# Patient Record
Sex: FEMALE | Race: WHITE | NOT HISPANIC OR LATINO | Employment: OTHER | URBAN - METROPOLITAN AREA
[De-identification: names, ages, dates, MRNs, and addresses within clinical notes are randomized per-mention and may not be internally consistent; named-entity substitution may affect disease eponyms.]

---

## 2023-12-18 ENCOUNTER — DOCUMENTATION ONLY (OUTPATIENT)
Dept: OTHER | Facility: CLINIC | Age: 73
End: 2023-12-18
Payer: MEDICARE

## 2023-12-21 VITALS
TEMPERATURE: 97.6 F | OXYGEN SATURATION: 97 % | DIASTOLIC BLOOD PRESSURE: 84 MMHG | HEART RATE: 78 BPM | WEIGHT: 165.8 LBS | BODY MASS INDEX: 26.02 KG/M2 | SYSTOLIC BLOOD PRESSURE: 136 MMHG | RESPIRATION RATE: 18 BRPM | HEIGHT: 67 IN

## 2023-12-21 PROBLEM — L71.9 ROSACEA: Status: ACTIVE | Noted: 2020-02-04

## 2023-12-21 PROBLEM — G30.9 MILD NEUROCOGNITIVE DISORDER DUE TO ALZHEIMER'S DISEASE (H): Status: ACTIVE | Noted: 2019-08-06

## 2023-12-21 PROBLEM — G31.84 MINIMAL COGNITIVE IMPAIRMENT: Status: ACTIVE | Noted: 2019-10-04

## 2023-12-21 PROBLEM — F06.70 MILD NEUROCOGNITIVE DISORDER DUE TO ALZHEIMER'S DISEASE (H): Status: ACTIVE | Noted: 2019-08-06

## 2023-12-21 PROBLEM — F09 COGNITIVE DISORDER: Status: ACTIVE | Noted: 2019-08-06

## 2023-12-21 PROBLEM — N84.1 ENDOCERVICAL POLYP: Status: ACTIVE | Noted: 2020-02-04

## 2023-12-21 RX ORDER — MEMANTINE HYDROCHLORIDE 10 MG/1
10 TABLET ORAL 2 TIMES DAILY
COMMUNITY

## 2023-12-21 RX ORDER — DIVALPROEX SODIUM 125 MG/1
125 TABLET, DELAYED RELEASE ORAL 2 TIMES DAILY
COMMUNITY

## 2023-12-21 RX ORDER — MULTIVIT WITH MINERALS/LUTEIN
1 TABLET ORAL DAILY
COMMUNITY

## 2023-12-21 RX ORDER — ACETAMINOPHEN 500 MG
1000 TABLET ORAL EVERY 6 HOURS PRN
Status: ON HOLD | COMMUNITY
End: 2024-02-12

## 2023-12-21 RX ORDER — QUETIAPINE FUMARATE 50 MG/1
50 TABLET, FILM COATED ORAL 2 TIMES DAILY
COMMUNITY
End: 2023-12-22

## 2023-12-21 RX ORDER — ESCITALOPRAM OXALATE 20 MG/1
20 TABLET ORAL DAILY
COMMUNITY

## 2023-12-21 RX ORDER — DONEPEZIL HYDROCHLORIDE 5 MG/1
5 TABLET, FILM COATED ORAL EVERY MORNING
COMMUNITY

## 2023-12-21 NOTE — PROGRESS NOTES
[facility] GERIATRICS    PRIMARY CARE PROVIDER AND CLINIC:  ERNST Valenzuela CNP, [address] [facility] [address]  Chief Complaint   Patient presents with    [facility] Medical Record Number:  [id number]  Place of Service where encounter took place:  [facility])(FGS) [[id number]]    Mildred Connelly  is a 73 year old  (1950),  admitted to the above facility on 12/4/23 .       HPI:      PMH: Alzheimer's dementia, anxiety    Resides at [facility] memory care unit.      Today's concern:    During exam, patient seen sitting in chair in memory care unit. HPI limited due to dementia. Reports doing well, likes living here and going to activities. Denies concerns today. Denies pain. Ambulates w/o assistive device. Admits to good appetite. Sleeping well at night. Denies constipation, diarrhea. Denies chest pain, SOB, headache, syncope.  Looking forward to spending [place] with her family/parents/siblings. States she grew up in [place] and was a .       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  FAMILY HISTORY: family history includes Atrial fibrillation in her father; Breast Cancer in her sister; Cancer in her brother; Cerebrovascular Disease in her  "brother, father, and maternal grandmother; Diabetes in her brother; Heart Disease in her brother; Liver Disease in her brother; Lymphoma in her mother; Osteoarthritis in her father; Stomach Cancer in her maternal grandmother.  SOCIAL HISTORY:   reports that she quit smoking about 49 years ago. Her smoking use included cigarettes. She has never used smokeless tobacco. She reports that she does not currently use alcohol. She reports that she does not use drugs.  Patient's living condition: lives in an assisted living facility      Post Discharge Medication Reconciliation Status:   MED REC REQUIRED  Post Medication Reconciliation Status: patient was not discharged from an inpatient facility or TCU    Current Outpatient Medications   Medication Sig    acetaminophen (TYLENOL) 500 MG tablet Take 1,000 mg by mouth every 6 hours as needed for mild pain    divalproex sodium delayed-release (DEPAKOTE) 125 MG DR tablet Take 125 mg by mouth 2 times daily    donepezil (ARICEPT) 5 MG tablet Take 5 mg by mouth daily    escitalopram (LEXAPRO) 20 MG tablet Take 20 mg by mouth daily    memantine (NAMENDA) 10 MG tablet Take 10 mg by mouth 2 times daily    multivitamin (CENTRUM SILVER) tablet Take 1 tablet by mouth daily    QUEtiapine (SEROQUEL) 50 MG tablet Take 50 mg by mouth 2 times daily     No current facility-administered medications for this visit.       ROS:  Limited secondary to cognitive impairment but today pt reports no concerns      Vitals:  /84   Pulse 78   Temp 97.6  F (36.4  C)   Resp 18   Ht 1.692 m (5' 6.6\")   Wt 75.2 kg (165 lb 12.8 oz)   SpO2 97%   BMI 26.28 kg/m    Exam:  GENERAL APPEARANCE:  Alert, in no distress, appears healthy, cooperative  ENT:  Mouth and posterior oropharynx normal, moist mucous membranes, normal hearing acuity  EYES:  EOM, conjunctivae, lids, pupils and irises normal, PERRL  RESP:  respiratory effort and palpation of chest normal, lungs clear to auscultation , no respiratory " distress  CV:  Regular rate and rhythm, no murmur, rub, or gallop, no edema  ABDOMEN:  normal bowel sounds, soft, nontender, no guarding or rebound  M/S:   Gait and station normal. Strong hand grasp.  SKIN:  Inspection of skin and subcutaneous tissue baseline, Palpation of skin and subcutaneous tissue baseline  NEURO:   Cranial nerves 2-12 are normal tested and grossly at patient's baseline, Examination of sensation by touch normal  PSYCH:  oriented to self, memory impaired , affect and mood normal      Lab/Diagnostic data:  Recent labs in [facility] reviewed by me today.                 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  Plan:   - Add seroquel 25mg BID PRN dx agitation  - Change administration time of seroquel 50mg BID at 12PM and 8PM dx anxiety with dementia  - Continue aricept, namenda  - Monitor changes in mood or behaviors  - Continue supportive services at [facility] memory care unit  - Patient to follow-up with [place] [facility]rologist as directed  - Reviewed patient status and treatment plan with Dayana (daughter). Discussed patient can have episodes of being difficult to redirect/paranoia, would like PRN seroquel available. Reviewed patient's medication list, states aricept was decreased to 5mg every day due to GI side effects. Also reviewed code status, confirmed code status as DNR/DNI. Plan to complete new POLST form, currently full code on file at [facility].     (E78.2) Mixed hyperlipidemia  Comment: Chronic  Plan:   - Consider rechecking lipid panel in 2-3 months      Orders:  - Add seroquel 25mg BID PRN dx agitation  - Change administration time of seroquel 50mg BID at 12PM and 8PM dx anxiety with dementia        Total time spent during today's visit was 65 mins including patient visit and review of past  records. Time also spent reviewing patient status and treatment plan with Dayana (daughter).     Electronically signed by:  ERNST Valenzuela CNP

## 2023-12-22 ENCOUNTER — ASSISTED LIVING VISIT (OUTPATIENT)
Dept: GERIATRICS | Facility: CLINIC | Age: 73
End: 2023-12-22
Payer: MEDICARE

## 2023-12-22 DIAGNOSIS — G30.9 MAJOR NEUROCOGNITIVE DISORDER DUE TO ALZHEIMER'S DISEASE (H): Primary | ICD-10-CM

## 2023-12-22 DIAGNOSIS — F02.80 MAJOR NEUROCOGNITIVE DISORDER DUE TO ALZHEIMER'S DISEASE (H): Primary | ICD-10-CM

## 2023-12-22 DIAGNOSIS — E78.2 MIXED HYPERLIPIDEMIA: ICD-10-CM

## 2023-12-22 DIAGNOSIS — F03.94 ANXIETY DUE TO DEMENTIA (H): ICD-10-CM

## 2023-12-22 PROCEDURE — 99344 HOME/RES VST NEW MOD MDM 60: CPT | Performed by: NURSE PRACTITIONER

## 2023-12-22 RX ORDER — QUETIAPINE FUMARATE 50 MG/1
TABLET, FILM COATED ORAL
Qty: 90 TABLET | Refills: 11 | Status: SHIPPED | OUTPATIENT
Start: 2023-12-22 | End: 2024-01-05

## 2023-12-22 NOTE — LETTER
12/22/2023        RE: Mildred Connelly  C/o Gaye Faulstick  [address] [facility] [address]        [facility] GERIATRICS    PRIMARY CARE PROVIDER AND CLINIC:  ERNST Valenzuela [facility], [address] [facility] [address]  Chief Complaint   Patient presents with     [facility] Medical Record Number:  [id number]  Place of Service where encounter took place:  [facility])(FGS) [[id number]]    Mildred Connelly  is a 73 year old  (1950),  admitted to the above facility on 12/4/23 .       HPI:      PMH: Alzheimer's dementia, anxiety    Resides at [facility] memory care unit.      Today's concern:    During exam, patient seen sitting in chair in memory care unit. HPI limited due to dementia. Reports doing well, likes living here and going to activities. Denies concerns today. Denies pain. Ambulates w/o assistive device. Admits to good appetite. Sleeping well at night. Denies constipation, diarrhea. Denies chest pain, SOB, headache, syncope.  Looking forward to spending [place] with her family/parents/siblings. States she grew up in [place] and was a .       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  FAMILY HISTORY: family history includes  "Atrial fibrillation in her father; Breast Cancer in her sister; Cancer in her brother; Cerebrovascular Disease in her brother, father, and maternal grandmother; Diabetes in her brother; Heart Disease in her brother; Liver Disease in her brother; Lymphoma in her mother; Osteoarthritis in her father; Stomach Cancer in her maternal grandmother.  SOCIAL HISTORY:   reports that she quit smoking about 49 years ago. Her smoking use included cigarettes. She has never used smokeless tobacco. She reports that she does not currently use alcohol. She reports that she does not use drugs.  Patient's living condition: lives in an assisted living facility      Post Discharge Medication Reconciliation Status:   MED REC REQUIRED  Post Medication Reconciliation Status: patient was not discharged from an inpatient facility or TCU    Current Outpatient Medications   Medication Sig     acetaminophen (TYLENOL) 500 MG tablet Take 1,000 mg by mouth every 6 hours as needed for mild pain     divalproex sodium delayed-release (DEPAKOTE) 125 MG DR tablet Take 125 mg by mouth 2 times daily     donepezil (ARICEPT) 5 MG tablet Take 5 mg by mouth daily     escitalopram (LEXAPRO) 20 MG tablet Take 20 mg by mouth daily     memantine (NAMENDA) 10 MG tablet Take 10 mg by mouth 2 times daily     multivitamin (CENTRUM SILVER) tablet Take 1 tablet by mouth daily     QUEtiapine (SEROQUEL) 50 MG tablet Take 50 mg by mouth 2 times daily     No current facility-administered medications for this visit.       ROS:  Limited secondary to cognitive impairment but today pt reports no concerns      Vitals:  /84   Pulse 78   Temp 97.6  F (36.4  C)   Resp 18   Ht 1.692 m (5' 6.6\")   Wt 75.2 kg (165 lb 12.8 oz)   SpO2 97%   BMI 26.28 kg/m    Exam:  GENERAL APPEARANCE:  Alert, in no distress, appears healthy, cooperative  ENT:  Mouth and posterior oropharynx normal, moist mucous membranes, normal hearing acuity  EYES:  EOM, conjunctivae, lids, pupils and " irises normal, PERRL  RESP:  respiratory effort and palpation of chest normal, lungs clear to auscultation , no respiratory distress  CV:  Regular rate and rhythm, no murmur, rub, or gallop, no edema  ABDOMEN:  normal bowel sounds, soft, nontender, no guarding or rebound  M/S:   Gait and station normal. Strong hand grasp.  SKIN:  Inspection of skin and subcutaneous tissue baseline, Palpation of skin and subcutaneous tissue baseline  NEURO:   Cranial nerves 2-12 are normal tested and grossly at patient's baseline, Examination of sensation by touch normal  PSYCH:  oriented to self, memory impaired , affect and mood normal      Lab/Diagnostic data:  Recent labs in [facility] reviewed by me today.                 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  Plan:   - Add seroquel 25mg BID PRN dx agitation  - Change administration time of seroquel 50mg BID at 12PM and 8PM dx anxiety with dementia  - Continue aricept, namenda  - Monitor changes in mood or behaviors  - Continue supportive services at [facility] memory care unit  - Patient to follow-up with [place] Neurologist as directed  - Reviewed patient status and treatment plan with Dayana (daughter). Discussed patient can have episodes of being difficult to redirect/paranoia, would like PRN seroquel available. Reviewed patient's medication list, states aricept was decreased to 5mg every day due to GI side effects. Also reviewed code status, confirmed code status as DNR/DNI. Plan to complete new POLST form, currently full code on file at [facility].     (E78.2) Mixed hyperlipidemia  Comment: Chronic  Plan:   - Consider rechecking lipid panel in 2-3 months      Orders:  - Add seroquel 25mg BID PRN dx agitation  - Change administration time of seroquel 50mg BID at 12PM and 8PM dx  anxiety with dementia        Total time spent during today's visit was 65 mins including patient visit and review of past records. Time also spent reviewing patient status and treatment plan with Dayana (daughter).     Electronically signed by:  ERNST Valenzuela CNP              [facility]   2023     Name: Mildred Connelly   : 1950       Orders:  - Add seroquel 25mg BID PRN dx agitation  - Change administration time of seroquel 50mg BID at 12PM and 8PM dx anxiety with dementia        Electronically signed by   ERNST Valenzuela CNP on 2023 at 4:36 PM          Sincerely,        ERNST Valenzuela CNP

## 2023-12-22 NOTE — PROGRESS NOTES
[facility] Geriatrics   2023     Name: Mildred Connelly   : 1950       Orders:  - Add seroquel 25mg BID PRN dx agitation  - Change administration time of seroquel 50mg BID at 12PM and 8PM dx anxiety with dementia        Electronically signed by   ERNST Valenzuela CNP on 2023 at 4:36 PM

## 2024-01-05 ENCOUNTER — ASSISTED LIVING VISIT (OUTPATIENT)
Dept: GERIATRICS | Facility: CLINIC | Age: 74
End: 2024-01-05
Payer: MEDICARE

## 2024-01-05 VITALS — BODY MASS INDEX: 26.53 KG/M2 | WEIGHT: 169 LBS | HEIGHT: 67 IN

## 2024-01-05 DIAGNOSIS — G47.00 INSOMNIA, UNSPECIFIED TYPE: ICD-10-CM

## 2024-01-05 DIAGNOSIS — G30.9 MAJOR NEUROCOGNITIVE DISORDER DUE TO ALZHEIMER'S DISEASE (H): Primary | ICD-10-CM

## 2024-01-05 DIAGNOSIS — F03.94 ANXIETY DUE TO DEMENTIA (H): ICD-10-CM

## 2024-01-05 DIAGNOSIS — F02.80 MAJOR NEUROCOGNITIVE DISORDER DUE TO ALZHEIMER'S DISEASE (H): Primary | ICD-10-CM

## 2024-01-05 PROCEDURE — 99350 HOME/RES VST EST HIGH MDM 60: CPT | Performed by: NURSE PRACTITIONER

## 2024-01-05 RX ORDER — QUETIAPINE FUMARATE 50 MG/1
TABLET, FILM COATED ORAL
Qty: 90 TABLET | Refills: 11 | Status: ON HOLD | OUTPATIENT
Start: 2024-01-05 | End: 2024-02-14

## 2024-01-05 NOTE — LETTER
"    1/5/2024        RE: Mildred Connelly  C/o Gaye Vuong  [address] [facility] [address]        [facility] GERIATRICS    Chief Complaint   Patient presents with     RECHECK     HPI:  Mildred Connelly is a 73 year old  (1950), who is being seen today for an episodic care visit at: [facility])(FGS) [[id number]].       Today's concern is:     RN staff report patient's sleep-wake cycle has been off. Patient is not sleeping at night and often fatigued during the day. Reports patient has been having intermittent behaviors towards the evening hours.     Per RN notes:   12/26/23 - Patient had agitation and aggression for the past three days. Behaviors included wandering, exit seeking, paranoia w/staff stealing from her, verbal aggression and insomnia.   12/28/23 - Noted to have increased agitation, wandering falls and yelling that a family member stole money and belongings. RA attempted to redirect patient and patient grabbed her wrist. Another RA assisted with situation, was able to escort patient back to apartment.     During exam, patient seen in common area of memory care unit. Able to ambulate w/o assistive device. HPI limited due to dementia. Reports doing well, does endorse feeling fatigued today. Sleeping well at night. Doesn't like to take naps during the day. Denies recent falls, pain. Admits to good appetite.       Allergies, and PMH/PSH reviewed in EPIC today.    REVIEW OF SYSTEMS:  Limited secondary to cognitive impairment but today pt reports no concerns      Objective:   Ht 1.689 m (5' 6.5\")   Wt 76.7 kg (169 lb)   BMI 26.87 kg/m    GENERAL APPEARANCE:  Alert, in no distress, appears healthy, cooperative  RESP: no respiratory distress  CV:  no edema  M/S:   Gait and station normal, slow gait.  SKIN:  Inspection of skin and subcutaneous tissue baseline, Palpation of skin and subcutaneous tissue baseline  NEURO:   Cranial nerves 2-12 are normal tested and grossly at patient's " baseline, Examination of sensation by touch normal  PSYCH:  oriented to self, memory impaired, affect and mood normal      Recent labs in [facility] reviewed by me today.        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 One incident on 12/28/23 with grabbing staff members wrist due to agitation. Uncontrolled insomnia, wandering at night.   Per Dayana, patient was combative in July 2023 and started on depakote with improvement in symptoms.  Plan:   - Reviewed patient status and recent behaviors with RN staff  - Reviewed patient status, medications and treatment options with Anamaria Brasher, PharmD. Discussed depakote and seroquel could be contributing towards daytime fatigue, recommending to change depakote to 8AM, 8PM; consider decreasing seroquel to 25mg at 12PM and continue seroquel 50mg at bedtime.   - Discussed patient status, current behaviors, past history and treatment options with Dayana (daughter). Agrees with changes to treatment plan.  - Change depakote to 8AM, 8PM dx dementia with paranoia  - Add melatonin 5mg at bedtime dx insomnia  - Decrease seroquel to 25mg every day at 12PM dx dementia with anxiety  - Continue seroquel 50mg at bedtime dx dementia with anxiety  - Continue seroquel 25mg BID PRN dx agitation, anxiety   - Continue lexapro 20mg every day, consider decreasing dose to 10mg every day due to potential side effect of drowsiness for older adults  - Continue aricept, namenda  - Monitor changes in mood or behaviors  - Continue supportive services at [facility] memory care unit      Orders:  - Change depakote to 8AM, 8PM dx dementia with paranoia  - Add melatonin 5mg at bedtime dx insomnia  - Decrease  seroquel to 25mg every day at 12PM dx dementia with anxiety  - Continue seroquel 50mg at bedtime dx dementia with anxiety  - Continue seroquel 25mg BID PRN dx agitation, anxiety         Total time spent during today's visit was 60 mins including patient visit (8 minutes) and review of past records (15 minutes). Time spent reviewing patient status and treatment plan with Dayana (daughter; 15 minutes). Time spent reviewing patient status, including behavioral concerns with staff (6 minutes). Time also spent coordinating care with Anamaria Brasher PharmD (16 minutes).     Electronically signed by: ERNST Valenzuela CNP              [facility] Geriatrics   2024     Name: Mildred Connelly   : 1950       Orders:  - Change administration time of depakote to 8AM, 8PM dx dementia with paranoia  - Add melatonin 5mg at bedtime dx insomnia  - Decrease seroquel to 25mg every day at 12PM dx dementia with anxiety  - Continue seroquel 50mg at bedtime dx dementia with anxiety  - Continue seroquel 25mg BID PRN dx agitation, anxiety       Electronically signed by   ERNST Valenzuela CNP on 2024 at 9:58 PM          Sincerely,        ERNST Valenzuela CNP

## 2024-01-05 NOTE — PROGRESS NOTES
"[facility] GERIATRICS    Chief Complaint   Patient presents with    RECHECK     HPI:  Mildred Connelly is a 73 year old  (1950), who is being seen today for an episodic care visit at: [facility])(FGS) [[id number]].       Today's concern is:     RN staff report patient's sleep-wake cycle has been off. Patient is not sleeping at night and often fatigued during the day. Reports patient has been having intermittent behaviors towards the evening hours.     Per RN notes:   12/26/23 - Patient had agitation and aggression for the past three days. Behaviors included wandering, exit seeking, paranoia w/staff stealing from her, verbal aggression and insomnia.   12/28/23 - Noted to have increased agitation, wandering falls and yelling that a family member stole money and belongings. RA attempted to redirect patient and patient grabbed her wrist. Another RA assisted with situation, was able to escort patient back to apartment.     During exam, patient seen in common area of memory care unit. Able to ambulate w/o assistive device. HPI limited due to dementia. Reports doing well, does endorse feeling fatigued today. Sleeping well at night. Doesn't like to take naps during the day. Denies recent falls, pain. Admits to good appetite.       Allergies, and PMH/PSH reviewed in [facility] today.    REVIEW OF SYSTEMS:  Limited secondary to cognitive impairment but today pt reports no concerns      Objective:   Ht 1.689 m (5' 6.5\")   Wt 76.7 kg (169 lb)   BMI 26.87 kg/m    GENERAL APPEARANCE:  Alert, in no distress, appears healthy, cooperative  RESP: no respiratory distress  CV:  no edema  M/S:   Gait and station normal, slow gait.  SKIN:  Inspection of skin and subcutaneous tissue baseline, Palpation of skin and subcutaneous tissue baseline  NEURO:   Cranial nerves 2-12 are normal tested and grossly at patient's baseline, Examination of sensation by touch normal  PSYCH:  oriented to self, memory impaired, affect and mood " normal      Recent labs in [facility] reviewed by me today.        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 One incident on 12/28/23 with grabbing staff members wrist due to agitation. Uncontrolled insomnia, wandering at night.   Per Dayana, patient was combative in July 2023 and started on depakote with improvement in symptoms.  Plan:   - Reviewed patient status and recent behaviors with RN staff  - Reviewed patient status, medications and treatment options with Anamaria Brasher, PharmD. Discussed depakote and seroquel could be contributing towards daytime fatigue, recommending to change depakote to 8AM, 8PM; consider decreasing seroquel to 25mg at 12PM and continue seroquel 50mg at bedtime.   - Discussed patient status, current behaviors, past history and treatment options with Dayana (daughter). Agrees with changes to treatment plan.  - Change depakote to 8AM, 8PM dx dementia with paranoia  - Add melatonin 5mg at bedtime dx insomnia  - Decrease seroquel to 25mg every day at 12PM dx dementia with anxiety  - Continue seroquel 50mg at bedtime dx dementia with anxiety  - Continue seroquel 25mg BID PRN dx agitation, anxiety   - Continue lexapro 20mg every day, consider decreasing dose to 10mg every day due to potential side effect of drowsiness for older adults  - Continue aricept, namenda  - Monitor changes in mood or behaviors  - Continue supportive services at [facility] memory care unit      Orders:  - Change depakote to 8AM, 8PM dx dementia with paranoia  - Add melatonin 5mg at bedtime dx insomnia  - Decrease seroquel to 25mg every day at 12PM dx dementia with anxiety  - Continue seroquel 50mg at bedtime dx dementia with  anxiety  - Continue seroquel 25mg BID PRN dx agitation, anxiety         Total time spent during today's visit was 60 mins including patient visit (8 minutes) and review of past records (15 minutes). Time spent reviewing patient status and treatment plan with Dayana (daughter; 15 minutes). Time spent reviewing patient status, including behavioral concerns with staff (6 minutes). Time also spent coordinating care with Anamaria Brasher PharmD (16 minutes).     Electronically signed by: ERNST Valenzuela CNP

## 2024-01-06 NOTE — PROGRESS NOTES
[facility] Geriatrics   2024     Name: Mildred Connelly   : 1950       Orders:  - Change administration time of depakote to 8AM, 8PM dx dementia with paranoia  - Add melatonin 5mg at bedtime dx insomnia  - Decrease seroquel to 25mg every day at 12PM dx dementia with anxiety  - Continue seroquel 50mg at bedtime dx dementia with anxiety  - Continue seroquel 25mg BID PRN dx agitation, anxiety       Electronically signed by   ERNST Valenzuela CNP on 2024 at 9:58 PM

## 2024-01-11 ENCOUNTER — DOCUMENTATION ONLY (OUTPATIENT)
Dept: GERIATRICS | Facility: CLINIC | Age: 74
End: 2024-01-11
Payer: MEDICARE

## 2024-01-22 ENCOUNTER — ASSISTED LIVING VISIT (OUTPATIENT)
Dept: GERIATRICS | Facility: CLINIC | Age: 74
End: 2024-01-22
Payer: MEDICARE

## 2024-01-22 ENCOUNTER — TELEPHONE (OUTPATIENT)
Dept: GERIATRICS | Facility: CLINIC | Age: 74
End: 2024-01-22
Payer: MEDICARE

## 2024-01-22 DIAGNOSIS — G30.9 MAJOR NEUROCOGNITIVE DISORDER DUE TO ALZHEIMER'S DISEASE (H): Primary | ICD-10-CM

## 2024-01-22 DIAGNOSIS — F02.80 MAJOR NEUROCOGNITIVE DISORDER DUE TO ALZHEIMER'S DISEASE (H): Primary | ICD-10-CM

## 2024-01-22 DIAGNOSIS — E78.2 MIXED HYPERLIPIDEMIA: ICD-10-CM

## 2024-01-22 PROCEDURE — 99348 HOME/RES VST EST LOW MDM 30: CPT | Performed by: INTERNAL MEDICINE

## 2024-01-22 NOTE — TELEPHONE ENCOUNTER
[facility] Triage Nurse Telephone Encounter    Provider: ERNST Fajardo CNP   Facility: [facility]  Facility Type:  AL    Caller: Annamaria  Call Back Number: [phone]    Allergies:  No Known Allergies     Reason for call: Nurse called to report that patient has been using the bathroom frequently today but is only voiding small amounts. Due to patient's cognition she is unable to answer questions regarding pain or burning with urination. Patient has no fever. Nurse wondering if UA/UC can be done?     Verbal Order/Direction given by Provider: Okay for UA/UC.    Provider giving Order:  ERNST Fajardo CNP     Verbal Order given to: Annamaria Davidson RN

## 2024-01-23 NOTE — PROGRESS NOTES
Pt was seen for an initial AL visit    Course reviewed with NP and facility staff    PM   Alzheimer's Disease, followed at the [facility]  Behavioral concerns, including anxiety and agitation, secondary to above, on seroquel, aricept, namenda, VPA  Hyperlipidemia    Medications were reviewed by me in Epic    ROS unobtainable secondary to cognitive impairment    Nursing staff notes intermittent agitation, paranoia, disrupted sleep  Seroquel and VPA dosing schedule have been adjusted.    FH/SH reviewed    Exam  Well appearing, initially seen sitting at dining room table, later walking independently on unit  Pleasant, oriented to self  HEENT oral mucosa moist  Lungs clear  CV rrr  Abd soft  No LE edema  No rigidity or tremors  Gait nl      Assessment/Plan    Alzheimer's Disease, early onset with behavioral sequela  On VPA, seroquel, namenda, aricept, escitalopram   Plan monitor behaviors, ongoing medication adjustments as clinical status dictates  Routine lab monitoring    Hyperlipidemia, on no medications      Dave Kelly MD

## 2024-01-30 ENCOUNTER — ASSISTED LIVING VISIT (OUTPATIENT)
Dept: GERIATRICS | Facility: CLINIC | Age: 74
End: 2024-01-30
Payer: MEDICARE

## 2024-01-30 VITALS
SYSTOLIC BLOOD PRESSURE: 102 MMHG | OXYGEN SATURATION: 94 % | DIASTOLIC BLOOD PRESSURE: 67 MMHG | HEIGHT: 67 IN | RESPIRATION RATE: 14 BRPM | WEIGHT: 173.6 LBS | TEMPERATURE: 96.2 F | HEART RATE: 83 BPM | BODY MASS INDEX: 27.25 KG/M2

## 2024-01-30 DIAGNOSIS — F03.94 ANXIETY DUE TO DEMENTIA (H): ICD-10-CM

## 2024-01-30 DIAGNOSIS — G30.9 MAJOR NEUROCOGNITIVE DISORDER DUE TO ALZHEIMER'S DISEASE (H): Primary | ICD-10-CM

## 2024-01-30 DIAGNOSIS — F02.80 MAJOR NEUROCOGNITIVE DISORDER DUE TO ALZHEIMER'S DISEASE (H): Primary | ICD-10-CM

## 2024-01-30 DIAGNOSIS — G47.00 INSOMNIA, UNSPECIFIED TYPE: ICD-10-CM

## 2024-01-30 PROCEDURE — 99349 HOME/RES VST EST MOD MDM 40: CPT | Performed by: NURSE PRACTITIONER

## 2024-01-30 NOTE — LETTER
1/30/2024        RE: Mildred Connelly  [address]  [address] [place] [address]        No notes on file      Sincerely,        ERNST Valenzuela CNP

## 2024-01-30 NOTE — PROGRESS NOTES
"[facility] GERIATRICS    Chief Complaint   Patient presents with    RECHECK     HPI:  Mildred Conenlly is a 73 year old  (1950), who is being seen today for an episodic care visit at: [facility]) [[id number]].       Today's concern is:     Per staff, patient continues to have insomnia, up most of the night wandering and sleeping during the day. Increased anxiety during the afternoon/evening hours.     During exam, patient seen sitting in common area of memory care unit. HPI limited due to dementia. Reports doing well, reports feeling fatigued today. Denies pain. Denies chest pain, SOB, headache, syncope.      Allergies, and PMH/PSH reviewed in [facility] today.    REVIEW OF SYSTEMS:  Limited secondary to cognitive impairment but today pt reports no concerns      Objective:   /67   Pulse 83   Temp (!) 96.2  F (35.7  C)   Resp 14   Ht 1.689 m (5' 6.5\")   Wt 78.7 kg (173 lb 9.6 oz)   SpO2 94%   BMI 27.60 kg/m    GENERAL APPEARANCE:  Alert, in no distress, appears healthy, cooperative  RESP: no respiratory distress  CV:  no edema  M/S:   Gait and station normal, slow gait.  SKIN:  Inspection of skin and subcutaneous tissue baseline, Palpation of skin and subcutaneous tissue baseline  NEURO:   Cranial nerves 2-12 are normal tested and grossly at patient's baseline, Examination of sensation by touch normal  PSYCH:  oriented to self, memory impaired, affect and mood normal      Recent labs in [facility] reviewed by me today.       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 Uncontrolled insomnia, wandering at night. Sleep/wake cycle is off.   Plan:   - Check CBC, CMP, TSH, vitamin D level on 2/7/24 dx dementia with anxiety, " insomnia, fatigue  - Change administration time of seroquel 25mg every day from 12PM to 2PM dx dementia with anxiety  - Continue seroquel 50mg at bedtime   - Continue depakote, melatonin  - Continue lexapro 20mg every day  - Continue aricept, namenda  - Monitor changes in mood or behaviors  - Continue supportive services at [facility] memory care unit       Orders:  - Change administration time of seroquel 25mg every day from 12PM to 2PM dx dementia with anxiety  - Check CBC, CMP, TSH, vitamin D level on 2/7/24 dx dementia with anxiety, insomnia, fatigue        Electronically signed by: ERNST Valenzuela CNP

## 2024-01-31 NOTE — PROGRESS NOTES
[facility]   2024     Name: Mildred Connelly   : 1950       Orders:  - Change administration time of seroquel 25mg every day from 12PM to 2PM dx dementia with anxiety  - Check CBC, CMP, TSH, vitamin D level on 24 dx dementia with anxiety, insomnia, fatigue        Electronically signed by   ERNST Valenzuela CNP on 2024 at 4:15 PM

## 2024-02-03 PROCEDURE — 87086 URINE CULTURE/COLONY COUNT: CPT | Mod: ORL | Performed by: NURSE PRACTITIONER

## 2024-02-03 PROCEDURE — 81001 URINALYSIS AUTO W/SCOPE: CPT | Mod: ORL | Performed by: NURSE PRACTITIONER

## 2024-02-04 ENCOUNTER — LAB REQUISITION (OUTPATIENT)
Dept: LAB | Facility: CLINIC | Age: 74
End: 2024-02-04
Payer: MEDICARE

## 2024-02-04 DIAGNOSIS — R35.0 FREQUENCY OF MICTURITION: ICD-10-CM

## 2024-02-04 LAB
ALBUMIN UR-MCNC: NEGATIVE MG/DL
APPEARANCE UR: CLEAR
BILIRUB UR QL STRIP: NEGATIVE
COLOR UR AUTO: YELLOW
GLUCOSE UR STRIP-MCNC: NEGATIVE MG/DL
HGB UR QL STRIP: NEGATIVE
KETONES UR STRIP-MCNC: NEGATIVE MG/DL
LEUKOCYTE ESTERASE UR QL STRIP: ABNORMAL
MUCOUS THREADS #/AREA URNS LPF: PRESENT /LPF
NITRATE UR QL: NEGATIVE
PH UR STRIP: 6.5 [PH] (ref 5–7)
RBC URINE: 1 /HPF
SP GR UR STRIP: 1.02 (ref 1–1.03)
SQUAMOUS EPITHELIAL: 1 /HPF
UROBILINOGEN UR STRIP-MCNC: NORMAL MG/DL
WBC URINE: 12 /HPF

## 2024-02-05 LAB — BACTERIA UR CULT: NORMAL

## 2024-02-06 ENCOUNTER — LAB REQUISITION (OUTPATIENT)
Dept: LAB | Facility: CLINIC | Age: 74
End: 2024-02-06
Payer: MEDICARE

## 2024-02-06 DIAGNOSIS — R53.1 WEAKNESS: ICD-10-CM

## 2024-02-06 DIAGNOSIS — F03.C4: ICD-10-CM

## 2024-02-06 DIAGNOSIS — G47.00 INSOMNIA, UNSPECIFIED: ICD-10-CM

## 2024-02-07 ENCOUNTER — APPOINTMENT (OUTPATIENT)
Dept: CT IMAGING | Facility: CLINIC | Age: 74
DRG: 481 | End: 2024-02-07
Attending: EMERGENCY MEDICINE
Payer: MEDICARE

## 2024-02-07 ENCOUNTER — APPOINTMENT (OUTPATIENT)
Dept: GENERAL RADIOLOGY | Facility: CLINIC | Age: 74
DRG: 481 | End: 2024-02-07
Attending: EMERGENCY MEDICINE
Payer: MEDICARE

## 2024-02-07 ENCOUNTER — HOSPITAL ENCOUNTER (INPATIENT)
Facility: CLINIC | Age: 74
LOS: 7 days | Discharge: NURSING, REHAB OR LONG-TERM CARE | DRG: 481 | End: 2024-02-14
Attending: EMERGENCY MEDICINE | Admitting: STUDENT IN AN ORGANIZED HEALTH CARE EDUCATION/TRAINING PROGRAM
Payer: MEDICARE

## 2024-02-07 DIAGNOSIS — R45.1 AGITATION: ICD-10-CM

## 2024-02-07 DIAGNOSIS — S72.001A HIP FRACTURE, RIGHT, CLOSED, INITIAL ENCOUNTER (H): Primary | ICD-10-CM

## 2024-02-07 LAB
ABO/RH(D): NORMAL
ANION GAP SERPL CALCULATED.3IONS-SCNC: 9 MMOL/L (ref 7–15)
ANTIBODY SCREEN: NEGATIVE
BASOPHILS # BLD AUTO: 0 10E3/UL (ref 0–0.2)
BASOPHILS NFR BLD AUTO: 0 %
BUN SERPL-MCNC: 22.4 MG/DL (ref 8–23)
CALCIUM SERPL-MCNC: 8.9 MG/DL (ref 8.8–10.2)
CHLORIDE SERPL-SCNC: 104 MMOL/L (ref 98–107)
CK SERPL-CCNC: 113 U/L (ref 26–192)
CREAT SERPL-MCNC: 0.77 MG/DL (ref 0.51–0.95)
DEPRECATED HCO3 PLAS-SCNC: 30 MMOL/L (ref 22–29)
EGFRCR SERPLBLD CKD-EPI 2021: 82 ML/MIN/1.73M2
EOSINOPHIL # BLD AUTO: 0.1 10E3/UL (ref 0–0.7)
EOSINOPHIL NFR BLD AUTO: 1 %
ERYTHROCYTE [DISTWIDTH] IN BLOOD BY AUTOMATED COUNT: 12.2 % (ref 10–15)
GLUCOSE SERPL-MCNC: 123 MG/DL (ref 70–99)
HCT VFR BLD AUTO: 39.6 % (ref 35–47)
HGB BLD-MCNC: 12.8 G/DL (ref 11.7–15.7)
HOLD SPECIMEN: NORMAL
IMM GRANULOCYTES # BLD: 0 10E3/UL
IMM GRANULOCYTES NFR BLD: 0 %
LYMPHOCYTES # BLD AUTO: 1.3 10E3/UL (ref 0.8–5.3)
LYMPHOCYTES NFR BLD AUTO: 12 %
MAGNESIUM SERPL-MCNC: 2.1 MG/DL (ref 1.7–2.3)
MCH RBC QN AUTO: 31.5 PG (ref 26.5–33)
MCHC RBC AUTO-ENTMCNC: 32.3 G/DL (ref 31.5–36.5)
MCV RBC AUTO: 98 FL (ref 78–100)
MONOCYTES # BLD AUTO: 0.6 10E3/UL (ref 0–1.3)
MONOCYTES NFR BLD AUTO: 6 %
NEUTROPHILS # BLD AUTO: 8.4 10E3/UL (ref 1.6–8.3)
NEUTROPHILS NFR BLD AUTO: 81 %
NRBC # BLD AUTO: 0 10E3/UL
NRBC BLD AUTO-RTO: 0 /100
PHOSPHATE SERPL-MCNC: 4.1 MG/DL (ref 2.5–4.5)
PLATELET # BLD AUTO: 186 10E3/UL (ref 150–450)
POTASSIUM SERPL-SCNC: 3.8 MMOL/L (ref 3.4–5.3)
RBC # BLD AUTO: 4.06 10E6/UL (ref 3.8–5.2)
SODIUM SERPL-SCNC: 143 MMOL/L (ref 135–145)
SPECIMEN EXPIRATION DATE: NORMAL
VIT D+METAB SERPL-MCNC: 27 NG/ML (ref 20–50)
WBC # BLD AUTO: 10.4 10E3/UL (ref 4–11)

## 2024-02-07 PROCEDURE — 82550 ASSAY OF CK (CPK): CPT | Performed by: EMERGENCY MEDICINE

## 2024-02-07 PROCEDURE — 250N000011 HC RX IP 250 OP 636: Performed by: EMERGENCY MEDICINE

## 2024-02-07 PROCEDURE — 36415 COLL VENOUS BLD VENIPUNCTURE: CPT | Performed by: STUDENT IN AN ORGANIZED HEALTH CARE EDUCATION/TRAINING PROGRAM

## 2024-02-07 PROCEDURE — 82306 VITAMIN D 25 HYDROXY: CPT | Performed by: PHYSICIAN ASSISTANT

## 2024-02-07 PROCEDURE — 70450 CT HEAD/BRAIN W/O DYE: CPT | Mod: MG

## 2024-02-07 PROCEDURE — 250N000011 HC RX IP 250 OP 636: Performed by: NURSE PRACTITIONER

## 2024-02-07 PROCEDURE — 84100 ASSAY OF PHOSPHORUS: CPT | Performed by: STUDENT IN AN ORGANIZED HEALTH CARE EDUCATION/TRAINING PROGRAM

## 2024-02-07 PROCEDURE — 73502 X-RAY EXAM HIP UNI 2-3 VIEWS: CPT

## 2024-02-07 PROCEDURE — 80048 BASIC METABOLIC PNL TOTAL CA: CPT | Performed by: EMERGENCY MEDICINE

## 2024-02-07 PROCEDURE — 250N000013 HC RX MED GY IP 250 OP 250 PS 637: Performed by: NURSE PRACTITIONER

## 2024-02-07 PROCEDURE — 72125 CT NECK SPINE W/O DYE: CPT | Mod: ME

## 2024-02-07 PROCEDURE — 96376 TX/PRO/DX INJ SAME DRUG ADON: CPT

## 2024-02-07 PROCEDURE — 99285 EMERGENCY DEPT VISIT HI MDM: CPT | Mod: 25

## 2024-02-07 PROCEDURE — 96374 THER/PROPH/DIAG INJ IV PUSH: CPT

## 2024-02-07 PROCEDURE — 85025 COMPLETE CBC W/AUTO DIFF WBC: CPT | Performed by: EMERGENCY MEDICINE

## 2024-02-07 PROCEDURE — 36415 COLL VENOUS BLD VENIPUNCTURE: CPT | Performed by: EMERGENCY MEDICINE

## 2024-02-07 PROCEDURE — 83735 ASSAY OF MAGNESIUM: CPT | Performed by: STUDENT IN AN ORGANIZED HEALTH CARE EDUCATION/TRAINING PROGRAM

## 2024-02-07 PROCEDURE — 120N000001 HC R&B MED SURG/OB

## 2024-02-07 PROCEDURE — 258N000003 HC RX IP 258 OP 636: Performed by: NURSE PRACTITIONER

## 2024-02-07 PROCEDURE — 250N000013 HC RX MED GY IP 250 OP 250 PS 637: Performed by: PHYSICIAN ASSISTANT

## 2024-02-07 PROCEDURE — 99223 1ST HOSP IP/OBS HIGH 75: CPT | Mod: AI | Performed by: NURSE PRACTITIONER

## 2024-02-07 PROCEDURE — 86900 BLOOD TYPING SEROLOGIC ABO: CPT | Performed by: PHYSICIAN ASSISTANT

## 2024-02-07 RX ORDER — QUETIAPINE FUMARATE 25 MG/1
25 TABLET, FILM COATED ORAL 2 TIMES DAILY PRN
Status: DISCONTINUED | OUTPATIENT
Start: 2024-02-07 | End: 2024-02-14 | Stop reason: HOSPADM

## 2024-02-07 RX ORDER — NALOXONE HYDROCHLORIDE 0.4 MG/ML
0.4 INJECTION, SOLUTION INTRAMUSCULAR; INTRAVENOUS; SUBCUTANEOUS
Status: DISCONTINUED | OUTPATIENT
Start: 2024-02-07 | End: 2024-02-14 | Stop reason: HOSPADM

## 2024-02-07 RX ORDER — POLYETHYLENE GLYCOL 3350 17 G/17G
17 POWDER, FOR SOLUTION ORAL 2 TIMES DAILY PRN
Status: DISCONTINUED | OUTPATIENT
Start: 2024-02-07 | End: 2024-02-14 | Stop reason: HOSPADM

## 2024-02-07 RX ORDER — DIVALPROEX SODIUM 125 MG/1
125 TABLET, DELAYED RELEASE ORAL 2 TIMES DAILY
Status: DISCONTINUED | OUTPATIENT
Start: 2024-02-07 | End: 2024-02-14 | Stop reason: HOSPADM

## 2024-02-07 RX ORDER — OXYCODONE HYDROCHLORIDE 5 MG/1
5 TABLET ORAL EVERY 4 HOURS PRN
Status: DISCONTINUED | OUTPATIENT
Start: 2024-02-07 | End: 2024-02-14 | Stop reason: HOSPADM

## 2024-02-07 RX ORDER — NALOXONE HYDROCHLORIDE 0.4 MG/ML
0.2 INJECTION, SOLUTION INTRAMUSCULAR; INTRAVENOUS; SUBCUTANEOUS
Status: DISCONTINUED | OUTPATIENT
Start: 2024-02-07 | End: 2024-02-14 | Stop reason: HOSPADM

## 2024-02-07 RX ORDER — VITAMIN B COMPLEX
50 TABLET ORAL DAILY
Status: DISCONTINUED | OUTPATIENT
Start: 2024-02-08 | End: 2024-02-14 | Stop reason: HOSPADM

## 2024-02-07 RX ORDER — QUETIAPINE FUMARATE 50 MG/1
50 TABLET, FILM COATED ORAL 2 TIMES DAILY
Status: DISCONTINUED | OUTPATIENT
Start: 2024-02-07 | End: 2024-02-14 | Stop reason: HOSPADM

## 2024-02-07 RX ORDER — LIDOCAINE 40 MG/G
CREAM TOPICAL
Status: DISCONTINUED | OUTPATIENT
Start: 2024-02-07 | End: 2024-02-08

## 2024-02-07 RX ORDER — AMOXICILLIN 250 MG
1 CAPSULE ORAL 2 TIMES DAILY PRN
Status: DISCONTINUED | OUTPATIENT
Start: 2024-02-07 | End: 2024-02-14 | Stop reason: HOSPADM

## 2024-02-07 RX ORDER — HYDROMORPHONE HCL IN WATER/PF 6 MG/30 ML
0.4 PATIENT CONTROLLED ANALGESIA SYRINGE INTRAVENOUS
Status: DISCONTINUED | OUTPATIENT
Start: 2024-02-07 | End: 2024-02-14 | Stop reason: HOSPADM

## 2024-02-07 RX ORDER — ACETAMINOPHEN 650 MG/1
650 SUPPOSITORY RECTAL EVERY 4 HOURS PRN
Status: DISCONTINUED | OUTPATIENT
Start: 2024-02-07 | End: 2024-02-14 | Stop reason: HOSPADM

## 2024-02-07 RX ORDER — DONEPEZIL HYDROCHLORIDE 5 MG/1
5 TABLET, FILM COATED ORAL EVERY MORNING
Status: DISCONTINUED | OUTPATIENT
Start: 2024-02-07 | End: 2024-02-14 | Stop reason: HOSPADM

## 2024-02-07 RX ORDER — ACETAMINOPHEN 325 MG/1
650 TABLET ORAL EVERY 4 HOURS PRN
Status: DISCONTINUED | OUTPATIENT
Start: 2024-02-07 | End: 2024-02-14 | Stop reason: HOSPADM

## 2024-02-07 RX ORDER — METHOCARBAMOL 500 MG/1
500 TABLET, FILM COATED ORAL 3 TIMES DAILY PRN
Status: DISCONTINUED | OUTPATIENT
Start: 2024-02-07 | End: 2024-02-08

## 2024-02-07 RX ORDER — MORPHINE SULFATE 4 MG/ML
4 INJECTION, SOLUTION INTRAMUSCULAR; INTRAVENOUS ONCE
Status: COMPLETED | OUTPATIENT
Start: 2024-02-07 | End: 2024-02-07

## 2024-02-07 RX ORDER — BISACODYL 10 MG
10 SUPPOSITORY, RECTAL RECTAL DAILY PRN
Status: DISCONTINUED | OUTPATIENT
Start: 2024-02-07 | End: 2024-02-08

## 2024-02-07 RX ORDER — QUETIAPINE FUMARATE 25 MG/1
25 TABLET, FILM COATED ORAL
Status: DISCONTINUED | OUTPATIENT
Start: 2024-02-07 | End: 2024-02-07

## 2024-02-07 RX ORDER — PROCHLORPERAZINE 25 MG
12.5 SUPPOSITORY, RECTAL RECTAL EVERY 12 HOURS PRN
Status: DISCONTINUED | OUTPATIENT
Start: 2024-02-07 | End: 2024-02-14 | Stop reason: HOSPADM

## 2024-02-07 RX ORDER — ESCITALOPRAM OXALATE 20 MG/1
20 TABLET ORAL DAILY
Status: DISCONTINUED | OUTPATIENT
Start: 2024-02-07 | End: 2024-02-14 | Stop reason: HOSPADM

## 2024-02-07 RX ORDER — HYDROMORPHONE HCL IN WATER/PF 6 MG/30 ML
0.2 PATIENT CONTROLLED ANALGESIA SYRINGE INTRAVENOUS
Status: DISCONTINUED | OUTPATIENT
Start: 2024-02-07 | End: 2024-02-14 | Stop reason: HOSPADM

## 2024-02-07 RX ORDER — MEMANTINE HYDROCHLORIDE 10 MG/1
10 TABLET ORAL 2 TIMES DAILY
Status: DISCONTINUED | OUTPATIENT
Start: 2024-02-07 | End: 2024-02-14 | Stop reason: HOSPADM

## 2024-02-07 RX ORDER — PROCHLORPERAZINE MALEATE 5 MG
5 TABLET ORAL EVERY 6 HOURS PRN
Status: DISCONTINUED | OUTPATIENT
Start: 2024-02-07 | End: 2024-02-14 | Stop reason: HOSPADM

## 2024-02-07 RX ORDER — CALCIUM CARBONATE 500 MG/1
1000 TABLET, CHEWABLE ORAL 4 TIMES DAILY PRN
Status: DISCONTINUED | OUTPATIENT
Start: 2024-02-07 | End: 2024-02-14 | Stop reason: HOSPADM

## 2024-02-07 RX ORDER — AMOXICILLIN 250 MG
2 CAPSULE ORAL 2 TIMES DAILY PRN
Status: DISCONTINUED | OUTPATIENT
Start: 2024-02-07 | End: 2024-02-14 | Stop reason: HOSPADM

## 2024-02-07 RX ORDER — ONDANSETRON 2 MG/ML
4 INJECTION INTRAMUSCULAR; INTRAVENOUS EVERY 6 HOURS PRN
Status: DISCONTINUED | OUTPATIENT
Start: 2024-02-07 | End: 2024-02-14 | Stop reason: HOSPADM

## 2024-02-07 RX ORDER — SODIUM CHLORIDE 9 MG/ML
INJECTION, SOLUTION INTRAVENOUS CONTINUOUS
Status: DISCONTINUED | OUTPATIENT
Start: 2024-02-07 | End: 2024-02-08

## 2024-02-07 RX ORDER — ONDANSETRON 4 MG/1
4 TABLET, ORALLY DISINTEGRATING ORAL EVERY 6 HOURS PRN
Status: DISCONTINUED | OUTPATIENT
Start: 2024-02-07 | End: 2024-02-14 | Stop reason: HOSPADM

## 2024-02-07 RX ADMIN — HYDROMORPHONE HYDROCHLORIDE 0.2 MG: 0.2 INJECTION, SOLUTION INTRAMUSCULAR; INTRAVENOUS; SUBCUTANEOUS at 17:17

## 2024-02-07 RX ADMIN — OXYCODONE HYDROCHLORIDE 2.5 MG: 5 TABLET ORAL at 21:06

## 2024-02-07 RX ADMIN — HYDROMORPHONE HYDROCHLORIDE 0.2 MG: 0.2 INJECTION, SOLUTION INTRAMUSCULAR; INTRAVENOUS; SUBCUTANEOUS at 13:09

## 2024-02-07 RX ADMIN — METHOCARBAMOL 500 MG: 500 TABLET ORAL at 18:28

## 2024-02-07 RX ADMIN — MEMANTINE 10 MG: 10 TABLET ORAL at 21:06

## 2024-02-07 RX ADMIN — QUETIAPINE FUMARATE 50 MG: 50 TABLET ORAL at 21:06

## 2024-02-07 RX ADMIN — MORPHINE SULFATE 4 MG: 4 INJECTION, SOLUTION INTRAMUSCULAR; INTRAVENOUS at 09:28

## 2024-02-07 RX ADMIN — SODIUM CHLORIDE: 9 INJECTION, SOLUTION INTRAVENOUS at 13:16

## 2024-02-07 RX ADMIN — MORPHINE SULFATE 4 MG: 4 INJECTION, SOLUTION INTRAMUSCULAR; INTRAVENOUS at 10:55

## 2024-02-07 RX ADMIN — OXYCODONE HYDROCHLORIDE 2.5 MG: 5 TABLET ORAL at 18:28

## 2024-02-07 RX ADMIN — HYDROMORPHONE HYDROCHLORIDE 0.2 MG: 0.2 INJECTION, SOLUTION INTRAMUSCULAR; INTRAVENOUS; SUBCUTANEOUS at 15:14

## 2024-02-07 ASSESSMENT — ACTIVITIES OF DAILY LIVING (ADL)
ADLS_ACUITY_SCORE: 35

## 2024-02-07 NOTE — ED NOTES
"[facility]  ED Nurse Handoff Report    ED Chief complaint: Fall and Hip Pain      ED Diagnosis:   Final diagnoses:   Hip fracture, right, closed, initial encounter (H)       Code Status: Hospitalist to discuss with patient    Allergies: No Known Allergies    Patient Story: Pt BIB EMS after fall, R hip intertrochanteric fracture.  fracture.  Focused Assessment:  R leg shortened and externally rotated, CMS intact, pedal pulses palpable    Treatments and/or interventions provided: IV morphine x2 administered  Patient's response to treatments and/or interventions: Pt reported improved comfort, still painful    To be done/followed up on inpatient unit:  NPO (except sips with meds), Surgery @ 1700    Does this patient have any cognitive concerns?: Alzheimer's    Activity level - Baseline/Home:  Independent  Activity Level - Current:    Bedrest    Patient's Preferred language: English   Needed?: No    Isolation: None  Infection: Not Applicable  Patient tested for COVID 19 prior to admission: NO  Bariatric?: No    Vital Signs:   Vitals:    02/07/24 0853 02/07/24 1050   BP: 112/56    Pulse: 62    Resp: 16    Temp: 98.4  F (36.9  C)    TempSrc: Oral    SpO2: 93% 95%   Weight: 72.6 kg (160 lb)    Height: 1.702 m (5' 7\")        Cardiac Rhythm:     Was the PSS-3 completed:   Yes  What interventions are required if any?               Family Comments: Pt's family at bedside    For the majority of the shift this patient's behavior was Green.   Behavioral interventions performed were none.    ED NURSE PHONE NUMBER: [phone]         "

## 2024-02-07 NOTE — ED PROVIDER NOTES
"History     Chief Complaint:  Fall and Hip Pain       The history is provided by a relative and the patient.      Mildred Connelly is a 73 year old female in memory care for Alzheimer's disease who presents after an unwitnessed fall. The patient's son reports that staff found Mildred on the floor this morning. It was unclear how she fell or how long she was on the floor. The patient reports some right sided hip pain, but denies any abdominal pain, chest pain, or headache. The son notes that Mildred broke her right ankle 15-20 years ago, and has a metal shayne in place. She has had no mobility issues since, and has been able to ambulate independently after this fall. He states that she has a 1 minute short-term memory cycle, but recognizes her children. The son denies any blood thinner for Mildred.  Patient last ate last night.    Independent Historian:    Son - provided history    Review of External Notes:  Previously seen at assisted living by geriatrics for major neurocognitive disorder.    Medications:    Depakote  Donepezil  Lexapro  Namenda  Seroquel  Sertraline     Past Medical History:    Anxiety due to dementia  Basal cell carcinoma of face  Dementia   Endocervical polyp  Mild neurocognitive disorder due to Alzheimer's disease  Mixed hyperlipidemia  Onychomycosis  Pneumonia  Rosacea  Tibia/fibula fracture    Past Surgical History:    Endometrial ablation  MOHS micrographic procedure  Orthopedic surgery    Physical Exam   Patient Vitals for the past 24 hrs:   BP Temp Temp src Pulse Resp SpO2 Height Weight   02/07/24 1050 -- -- -- -- -- 95 % -- --   02/07/24 0853 112/56 98.4  F (36.9  C) Oral 62 16 93 % 1.702 m (5' 7\") 72.6 kg (160 lb)        Physical Exam  Vitals reviewed.   Constitutional:       General: She is not in acute distress.     Appearance: She is not ill-appearing.   HENT:      Head: Normocephalic and atraumatic.   Eyes:      Extraocular Movements: Extraocular movements intact.   Cardiovascular:      Rate and " Rhythm: Normal rate and regular rhythm.   Pulmonary:      Effort: Pulmonary effort is normal. No respiratory distress.      Breath sounds: Normal breath sounds. No wheezing.   Abdominal:      Palpations: Abdomen is soft.      Tenderness: There is no abdominal tenderness. There is no guarding.   Musculoskeletal:      Cervical back: Normal range of motion. No rigidity or tenderness.      Comments: Right lower extremity is shortened and rotated.  Tenderness to the right hip.  No tenderness or deformity noted to the right knee or ankle.  2+ DP pulse.  No wounds or lacerations noted to the right hip.   Skin:     General: Skin is warm and dry.   Neurological:      Mental Status: She is alert.      GCS: GCS eye subscore is 4. GCS verbal subscore is 5. GCS motor subscore is 6.      Comments: Alert oriented to person.   Psychiatric:         Behavior: Behavior normal.           Emergency Department Course     Imaging:  CT Cervical Spine w/o Contrast   Preliminary Result   IMPRESSION:   1. No acute cervical spine fracture.   2. Multilevel degenerative changes, as described.   3. Varying degrees of spinal canal or neural foraminal stenosis,   including moderate to severe spinal canal narrowing at C5-C6 and C6-C7   and moderate to severe multilevel neural foraminal stenosis.       Head CT w/o contrast   Final Result   IMPRESSION:    1. No CT findings of acute intracranial process.   2. Brain atrophy and presumed chronic small vessel ischemic changes,   as described.       CHUNG BA MD            SYSTEM ID:  [id number]      XR Pelvis w Hip Right 1 View   Final Result   IMPRESSION: Intertrochanteric fracture right hip. No evidence for   dislocation. Left hip negative for fracture. Pelvis negative for   fracture.      HEATHER CEALYA MD            SYSTEM ID:  [id number]        Report per radiology    Laboratory:  Labs Ordered and Resulted from Time of ED Arrival to Time of ED Departure   BASIC METABOLIC PANEL - Abnormal        Result Value    Sodium 143      Potassium 3.8      Chloride 104      Carbon Dioxide (CO2) 30 (*)     Anion Gap 9      Urea Nitrogen 22.4      Creatinine 0.77      GFR Estimate 82      Calcium 8.9      Glucose 123 (*)    CBC WITH PLATELETS AND DIFFERENTIAL - Abnormal    WBC Count 10.4      RBC Count 4.06      Hemoglobin 12.8      Hematocrit 39.6      MCV 98      MCH 31.5      MCHC 32.3      RDW 12.2      Platelet Count 186      % Neutrophils 81      % Lymphocytes 12      % Monocytes 6      % Eosinophils 1      % Basophils 0      % Immature Granulocytes 0      NRBCs per 100 WBC 0      Absolute Neutrophils 8.4 (*)     Absolute Lymphocytes 1.3      Absolute Monocytes 0.6      Absolute Eosinophils 0.1      Absolute Basophils 0.0      Absolute Immature Granulocytes 0.0      Absolute NRBCs 0.0     CK TOTAL - Normal         ABO/RH TYPE AND SCREEN     Emergency Department Course & Assessments:    Interventions:  Medications   lidocaine 1 % 0.1-1 mL (has no administration in time range)   lidocaine (LMX4) cream (has no administration in time range)   sodium chloride (PF) 0.9% PF flush 3 mL (has no administration in time range)   sodium chloride (PF) 0.9% PF flush 3 mL (has no administration in time range)   senna-docusate (SENOKOT-S/PERICOLACE) 8.6-50 MG per tablet 1 tablet (has no administration in time range)     Or   senna-docusate (SENOKOT-S/PERICOLACE) 8.6-50 MG per tablet 2 tablet (has no administration in time range)   calcium carbonate (TUMS) chewable tablet 1,000 mg (has no administration in time range)   sodium chloride 0.9 % infusion (has no administration in time range)   acetaminophen (TYLENOL) tablet 650 mg (has no administration in time range)     Or   acetaminophen (TYLENOL) Suppository 650 mg (has no administration in time range)   oxyCODONE IR (ROXICODONE) half-tab 2.5 mg (has no administration in time range)   oxyCODONE (ROXICODONE) tablet 5 mg (has no administration in time range)   HYDROmorphone  (DILAUDID) injection 0.2 mg (has no administration in time range)   HYDROmorphone (DILAUDID) injection 0.4 mg (has no administration in time range)   No Medication Sleep Aids for this Patient (has no administration in time range)   polyethylene glycol (MIRALAX) Packet 17 g (has no administration in time range)   bisacodyl (DULCOLAX) suppository 10 mg (has no administration in time range)   ondansetron (ZOFRAN ODT) ODT tab 4 mg (has no administration in time range)     Or   ondansetron (ZOFRAN) injection 4 mg (has no administration in time range)   prochlorperazine (COMPAZINE) injection 5 mg (has no administration in time range)     Or   prochlorperazine (COMPAZINE) tablet 5 mg (has no administration in time range)     Or   prochlorperazine (COMPAZINE) suppository 12.5 mg (has no administration in time range)   benzocaine-menthol (CHLORASEPTIC) 6-10 MG lozenge 1 lozenge (has no administration in time range)   morphine (PF) injection 4 mg (4 mg Intravenous $Given 2/7/24 0928)   morphine (PF) injection 4 mg (4 mg Intravenous $Given 2/7/24 1055)          Assessments/Consultations/Discussion of Management or Tests:  ED Course as of 02/07/24 1126   Wed Feb 07, 2024   0906 I obtained history and examined the patient as noted above.    0954 Reviewed x-ray right hip fracture identified.  Plan for admission at this time.   0955 CK Total: 113   1004 I rechecked the patient and explained findings.    1028 Patient last ate last night.   1040 I spoke with BENJI Gomez, [facility], regarding the patient's history and presentation in the emergency department today.   1054 I rechecked the patient and explained findings. We discussed a plan for admission and patient's family is comfortable with plan.    1056 I spoke with Dr. Caballero of the hospitalist team regarding the patient, who accepted the patient for admission.      Social Determinants of Health affecting care:  None     Disposition:  The patient was admitted to the hospital  under the care of Dr. Caballero.     Impression & Plan    CMS Diagnoses: None    Medical Decision Makin-year-old female presenting today with right hip pain.  She had an unwitnessed fall at her memory care facility overnight.  No blood thinners.  No other injuries.  Found to have a right hip fracture on x-ray.  CT of the head and cervical spine showed no acute injury.  Patient and family updated on results.  Orthopedist, [facility] contacted patient scheduled for OR later today.  Hospitalist also contacted for admission.    Diagnosis:    ICD-10-CM    1. Hip fracture, right, closed, initial encounter (H)  S72.001A Case Request: OPEN REDUCTION INTERNAL FIXATION RIGHT HIP FRACTURE     Case Request: OPEN REDUCTION INTERNAL FIXATION RIGHT HIP FRACTURE        Scribe Disclosure:  Anu CAMPBELL, am serving as a scribe at 9:15 AM on 2024 to document services personally performed by Denise Talbot DO, based on my observations and the provider's statements to me.  2024   Denise Talbot DO Doan, Tiffani, DO  24 [phone]

## 2024-02-07 NOTE — PHARMACY-ADMISSION MEDICATION HISTORY
Pharmacist Admission Medication History    Admission medication history is complete. The information provided in this note is only as accurate as the sources available at the time of the update.    Information Source(s): Facility (U/NH/) medication list/MAR via in-person    Pertinent Information: none    Changes made to PTA medication list:  Added: None  Deleted: Melatonin  Changed: Seroquel to 50 mg bid    Allergies reviewed with patient and updates made in EHR: yes    Medication History Completed By: Fracisco Ferrell [facility] 2/7/2024 10:18 AM    Prior to Admission medications    Medication Sig Last Dose Taking? Auth Provider Long Term End Date   acetaminophen (TYLENOL) 500 MG tablet Take 1,000 mg by mouth every 6 hours as needed for mild pain Unknown at prn Yes Reported, Patient     divalproex sodium delayed-release (DEPAKOTE) 125 MG DR tablet Take 125 mg by mouth 2 times daily 2/6/2024 at 1200 Yes Reported, Patient Yes    donepezil (ARICEPT) 5 MG tablet Take 5 mg by mouth every morning 2/6/2024 at 0800 Yes Reported, Patient     escitalopram (LEXAPRO) 20 MG tablet Take 20 mg by mouth daily 2/6/2024 at 0800 Yes Reported, Patient Yes    memantine (NAMENDA) 10 MG tablet Take 10 mg by mouth 2 times daily 2/6/2024 at 2000 Yes Reported, Patient     multivitamin (CENTRUM SILVER) tablet Take 1 tablet by mouth daily 2/6/2024 at 0800 Yes Reported, Patient     QUEtiapine (SEROQUEL) 50 MG tablet Take 1 tablet (50 mg) by mouth at bedtime AND 0.5 tablets (25 mg) daily (with lunch). May also take 0.5 tablets (25 mg) 2 times daily as needed.  Patient taking differently: 50 mg twice a day 2/6/2024 at 2000 Yes Marie High APRN CNP Yes

## 2024-02-07 NOTE — ED TRIAGE NOTES
Pt BIB EMS, from [facility] ([facility] care), after pt was found on the floor (for an undetermined amount of time). Fall was unwitnessed, and pt cannot recall falling. Pt denies head or neck pain. Pt is not on blood thinners. R hip is painful, R leg is shortened and externally rotated. EMS administered 4 mg IV morphine.     .    Pt's dtr is on the way.

## 2024-02-07 NOTE — TREATMENT PLAN
Right hip intertrochanteric fracture    Patient scheduled for a right hip IM Nail later today pending patient is optimized for surgery per hospitalist.    Surgeon: Dr Dick  NPO Status effective now   NWB/Bedrest until postop  Vit D ordered   Hold anticoagulants if taken: none reported PTA  Pain medication as needed, minimize narcotics as able    Hanna Morelos PA-C on 2/7/2024 at 11:00 AM

## 2024-02-07 NOTE — TREATMENT PLAN
Dr Dick was contacted by the OR that there is a scheduling conflict with xray until late tonight.  Case is rescheduled to tomorrow at 12:30.    Okay to eat today, NPO midnight    Hanna Morelos PA-C on 2/7/2024 at 3:04 PM

## 2024-02-07 NOTE — CONSULTS
[facility]    Orthopedic Consultation    Mildred Connelly MRN# [id number]   Age: 73 year old YOB: 1950     Date of Admission:  2/7/2024    Reason for consult: Right hip fracture        Requesting provider: Call from ED, Dr Talbot       Level of consult: Consult, follow and place orders           Assessment and Plan:   Assessment:  Right hip intertrochanteric fracture    Plan:   Patient scheduled for a right hip IM Nail later today pending patient is optimized for surgery per hospitalist.    Surgeon: Dr Dick  NPO Status effective now   NWB/Bedrest until postop  Vit D ordered   Hold anticoagulants if taken: none reported PTA  Pain medication as needed, minimize narcotics as able           Chief Complaint:   Right hip pain          History of Present Illness:   HPI per medical records and family as patient unable to provide history due to her dementia.   Mildred Connelly is a 73 year old female presented to the ED from her memory care for an unwitnessed fall. The patient's family report that staff found Mildred on the floor this morning in her kitchenette. It was unclear how she fell or how long she was on the floor. The patient reported right sided hip pain.  Xrays confirmed a right hip fracture.  Patient ambulates independently at baseline.           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    Minimal cognitive impairment 10/04/2019    Mixed hyperlipidemia 07/30/2010    Onychomycosis 04/14/2009    Pneumonia 2014    Rosacea 02/04/2020    Skin cancer     Tibia/fibula fracture 2005             Past Surgical History:     Past Surgical History:   Procedure Laterality Date    ENDOMETRIAL ABLATION  2004    MOHS MICROGRAPHIC PROCEDURE  2011    Nose surgery    ORTHOPEDIC SURGERY                Social History:     Social History     Tobacco Use    Smoking status: Former     Types: Cigarettes     Quit date:      Years since quittin.1    Smokeless tobacco: Never   Substance Use Topics    Alcohol use: Not Currently             Family History:     Family History   Problem Relation Age of Onset    Lymphoma Mother     Atrial fibrillation Father     Osteoarthritis Father     Cerebrovascular Disease Father     Breast Cancer Sister     Liver Disease Brother     Diabetes Brother     Heart Disease Brother     Cerebrovascular Disease Brother     Cancer Brother     Stomach Cancer Maternal Grandmother     Cerebrovascular Disease Maternal Grandmother              Immunizations:     VACCINE/DOSE   Diptheria   DPT   DTAP   HBIG   Hepatitis A   Hepatitis B   HIB   Influenza   Measles   Meningococcal   MMR   Mumps   Pneumococcal   Polio   Rubella   Small Pox   TDAP   Varicella   Zoster             Allergies:   No Known Allergies          Medications:     Current Facility-Administered Medications   Medication    acetaminophen (TYLENOL) tablet 650 mg    Or    acetaminophen (TYLENOL) Suppository 650 mg    benzocaine-menthol (CHLORASEPTIC) 6-10 MG lozenge 1 lozenge    bisacodyl (DULCOLAX) suppository 10 mg    calcium carbonate (TUMS) chewable tablet 1,000 mg    HYDROmorphone (DILAUDID) injection 0.2 mg    HYDROmorphone (DILAUDID) injection 0.4 mg    lidocaine (LMX4) cream    lidocaine 1 % 0.1-1 mL    No Medication Sleep Aids for this Patient    ondansetron (ZOFRAN ODT) ODT tab 4 mg    Or    ondansetron (ZOFRAN) injection 4 mg    oxyCODONE (ROXICODONE) tablet 5 mg    oxyCODONE IR (ROXICODONE) half-tab 2.5 mg    polyethylene glycol (MIRALAX) Packet 17 g    prochlorperazine (COMPAZINE) injection 5 mg    Or    prochlorperazine (COMPAZINE) tablet 5 mg    Or    prochlorperazine (COMPAZINE) suppository 12.5 mg    senna-docusate (SENOKOT-S/PERICOLACE) 8.6-50 MG per tablet 1 tablet    Or    senna-docusate  "(SENOKOT-S/PERICOLACE) 8.6-50 MG per tablet 2 tablet    sodium chloride (PF) 0.9% PF flush 3 mL    sodium chloride (PF) 0.9% PF flush 3 mL    sodium chloride 0.9 % infusion     Current Outpatient Medications   Medication Sig    acetaminophen (TYLENOL) 500 MG tablet Take 1,000 mg by mouth every 6 hours as needed for mild pain    divalproex sodium delayed-release (DEPAKOTE) 125 MG DR tablet Take 125 mg by mouth 2 times daily    donepezil (ARICEPT) 5 MG tablet Take 5 mg by mouth every morning    escitalopram (LEXAPRO) 20 MG tablet Take 20 mg by mouth daily    memantine (NAMENDA) 10 MG tablet Take 10 mg by mouth 2 times daily    multivitamin (CENTRUM SILVER) tablet Take 1 tablet by mouth daily    QUEtiapine (SEROQUEL) 50 MG tablet Take 1 tablet (50 mg) by mouth at bedtime AND 0.5 tablets (25 mg) daily (with lunch). May also take 0.5 tablets (25 mg) 2 times daily as needed. (Patient taking differently: 50 mg twice a day)             Review of Systems:   ROS:  10 point ROS neg other than the symptoms noted above in the HPI.            Physical Exam:   All vitals have been reviewed  Patient Vitals for the past 24 hrs:   BP Temp Temp src Pulse Resp SpO2 Height Weight   02/07/24 1050 -- -- -- -- -- 95 % -- --   02/07/24 0853 112/56 98.4  F (36.9  C) Oral 62 16 93 % 1.702 m (5' 7\") 72.6 kg (160 lb)     No intake or output data in the 24 hours ending 02/07/24 1248      Physical Exam   Temp: 98.4  F (36.9  C) Temp src: Oral BP: 112/56 Pulse: 62   Resp: 16 SpO2: 95 % O2 Device: None (Room air)    Vital Signs with Ranges  Temp:  [98.4  F (36.9  C)] 98.4  F (36.9  C)  Pulse:  [62] 62  Resp:  [16] 16  BP: (112)/(56) 112/56  SpO2:  [93 %-95 %] 95 %  160 lbs 0 oz    Constitutional: Eyes closed during exam. Dementia   HEENT: Head atraumatic normocephalic.  Respiratory: Unlabored breathing no audible wheeze  Cardiovascular: Regular rate and rhythm per pulses  GI: Abdomen non-distended.  Lymph/Hematologic: No lymphadenopathy in areas " examined  Genitourinary:  No arizmendi  Skin: No rashes, no cyanosis, no edema.  Musculoskeletal: On physical exam of the right lower extremity, patient's leg is resting in a shortened and externally rotated position.  No skin abrasions seen at the hip.  Patient is able to flex/extend toes.  Reacts to sensation to light touch on the left versus right lower extremities.  Distal pulses are intact and equal bilaterally.    Neurologic: normal without focal findings            Data:   All laboratory data reviewed  Results for orders placed or performed during the hospital encounter of 02/07/24   XR Pelvis w Hip Right 1 View     Status: None    Narrative    XR PELVIS AND HIP RIGHT 1 VIEW 2/7/2024 9:43 AM     HISTORY: right hip pain    COMPARISON: None.       Impression    IMPRESSION: Intertrochanteric fracture right hip. No evidence for  dislocation. Left hip negative for fracture. Pelvis negative for  fracture.    HEATHER CELAYA MD         SYSTEM ID:  [id number]   Head CT w/o contrast     Status: None    Narrative    CT SCAN OF THE HEAD WITHOUT CONTRAST   2/7/2024 10:13 AM     HISTORY: Fall, unwitnessed, altered mental status.     TECHNIQUE:  Axial images of the head and coronal reformations without  IV contrast material. Radiation dose for this scan was reduced using  automated exposure control, adjustment of the mA and/or kV according  to patient size, or iterative reconstruction technique.    COMPARISON: None.    FINDINGS: There is no evidence of intracranial hemorrhage, mass, acute  infarct or anomaly. The ventricles are normal in size, shape and  configuration. Mild to moderate diffuse parenchymal volume loss. Mild  patchy periventricular white matter hypodensities which are  nonspecific, but likely related to chronic microvascular ischemic  disease. Atherosclerotic calcifications of the carotid siphons.    The visualized portions of the sinuses and mastoids appear normal. The  bony calvarium and bones of the skull base  appear intact.       Impression    IMPRESSION:   1. No CT findings of acute intracranial process.  2. Brain atrophy and presumed chronic small vessel ischemic changes,  as described.     CHUNG BA MD         SYSTEM ID:  [id number]   CT Cervical Spine w/o Contrast     Status: None    Narrative    CT CERVICAL SPINE WITHOUT CONTRAST   2/7/2024 10:14 AM     HISTORY: Unwitnessed fall.     TECHNIQUE: Axial images of the cervical spine were obtained without  intravenous contrast. Multiplanar reformations were performed.   Radiation dose for this scan was reduced using automated exposure  control, adjustment of the mA and/or kV according to patient size, or  iterative reconstruction technique.    COMPARISON: None.    FINDINGS: No acute cervical spine fracture is identified. Normal  vertebral body heights. Slight reversal of the normal cervical  lordosis. Minimal anterolisthesis of C3 on C4. Mild levoconvex  curvature centered at the C7 level.    There is at least moderate disc height loss at C4-C5 and moderate to  severe/severe disc height loss C5-C6, C6-C7 and C7-T1. Mild disc  height loss C3-C4. Scattered marginal endplate and uncovertebral  spurs. Ankylosis across the C3-C4 facet joints. Mild/moderate  degenerative facet disease elsewhere. Degenerative changes of the  medial atlantoaxial joint are noted. Disc osteophyte complexes at  multiple levels. Moderate to severe spinal canal stenosis at C5-C6 and  C6-C7, and probably at least moderate spinal canal stenosis at C4-C5.  Lesser degrees of spinal canal narrowing elsewhere. Multilevel  degenerative neural foraminal stenosis, moderate/severe in degree on  the right at C4-C5, C5-C6, C6-C7, and C7-T1. There is also  moderate/moderate to severe neural foraminal stenosis on the left at  C4-C5 and C6-C7 and to a slightly lesser degree at C5-C6.    The visualized paraspinous soft tissues appear grossly unremarkable.      Impression    IMPRESSION:  1. No acute cervical  spine fracture.  2. Multilevel degenerative changes, as described.  3. Varying degrees of spinal canal or neural foraminal stenosis,  including moderate to severe spinal canal narrowing at C5-C6 and C6-C7  and moderate to severe multilevel neural foraminal stenosis.     CHUNG BA MD         SYSTEM ID:  [id number]   Basic metabolic panel     Status: Abnormal   Result Value Ref Range    Sodium 143 135 - 145 mmol/L    Potassium 3.8 3.4 - 5.3 mmol/L    Chloride 104 98 - 107 mmol/L    Carbon Dioxide (CO2) 30 (H) 22 - 29 mmol/L    Anion Gap 9 7 - 15 mmol/L    Urea Nitrogen 22.4 8.0 - 23.0 mg/dL    Creatinine 0.77 0.51 - 0.95 mg/dL    GFR Estimate 82 >60 mL/min/1.73m2    Calcium 8.9 8.8 - 10.2 mg/dL    Glucose 123 (H) 70 - 99 mg/dL   [place] Draw     Status: None    Narrative    The following orders were created for panel order [place] Draw.  Procedure                               Abnormality         Status                     ---------                               -----------         ------                     Extra Blue Top Tube[[id number]]                              Final result                 Please view results for these tests on the individual orders.   CBC with platelets and differential     Status: Abnormal   Result Value Ref Range    WBC Count 10.4 4.0 - 11.0 10e3/uL    RBC Count 4.06 3.80 - 5.20 10e6/uL    Hemoglobin 12.8 11.7 - 15.7 g/dL    Hematocrit 39.6 35.0 - 47.0 %    MCV 98 78 - 100 fL    MCH 31.5 26.5 - 33.0 pg    MCHC 32.3 31.5 - 36.5 g/dL    RDW 12.2 10.0 - 15.0 %    Platelet Count 186 150 - 450 10e3/uL    % Neutrophils 81 %    % Lymphocytes 12 %    % Monocytes 6 %    % Eosinophils 1 %    % Basophils 0 %    % Immature Granulocytes 0 %    NRBCs per 100 WBC 0 <1 /100    Absolute Neutrophils 8.4 (H) 1.6 - 8.3 10e3/uL    Absolute Lymphocytes 1.3 0.8 - 5.3 10e3/uL    Absolute Monocytes 0.6 0.0 - 1.3 10e3/uL    Absolute Eosinophils 0.1 0.0 - 0.7 10e3/uL    Absolute Basophils 0.0 0.0 - 0.2 10e3/uL     Absolute Immature Granulocytes 0.0 <=0.4 10e3/uL    Absolute NRBCs 0.0 10e3/uL   Extra Blue Top Tube     Status: None   Result Value Ref Range    Hold Specimen JIC    CK total     Status: Normal   Result Value Ref Range     26 - 192 U/L   Adult Type and Screen     Status: None   Result Value Ref Range    ABO/RH(D) O POS     Antibody Screen Negative Negative    SPECIMEN EXPIRATION DATE [id number]    CBC with platelets + differential     Status: Abnormal    Narrative    The following orders were created for panel order CBC with platelets + differential.  Procedure                               Abnormality         Status                     ---------                               -----------         ------                     CBC with platelets and d...[[id number]]  Abnormal            Final result                 Please view results for these tests on the individual orders.   ABO/Rh type and screen     Status: None    Narrative    The following orders were created for panel order ABO/Rh type and screen.  Procedure                               Abnormality         Status                     ---------                               -----------         ------                     Adult Type and Screen[[id number]]                            Final result                 Please view results for these tests on the individual orders.          Attestation:  I have reviewed today's vital signs, notes, medications, labs and imaging with Dr. Dick.  Amount of time performed on this consult: 50 minutes.    Hanna Morelos PA-C

## 2024-02-07 NOTE — H&P
[facility]    History and Physical - Hospitalist Service       Date of Admission:  2/7/2024    Assessment & Plan      Mildred Connelly is a 73 year old female admitted on 2/7/2024. Her past medical history is significant for only Alzheimer's Disease, for which she is followed at the [facility]. Today she presented by ambulance from her Memory Care Facility after being found down for an unknown period of time. It is unclear how she fell or how long she was lying on the floor. She was found to have a right hip fracture on x-ray in the emergency department. Other imaging, including CT head and cervical spine showed no acute injuries. . Other labs within normal limits. She is admitted today with plans for right hip surgery with orthopedics later today.     Right hip intertrochanteric fracture   Unwitnessed fall   It is unclear how or when exactly the patient fell. She endorses right hip pain. Right lower extremity is shortened and externally rotated on exam.   - Orthopedics consulted from ED. Plan for R hip ORIF with Dr. Dick later today. RCRI score 0. Medically optimized for surgery, no further workup needed at this time.   - NPO   - NWB/Bedrest until surgery   - Pain management, DVT prophylaxis, PT/OT consults per ortho  - HGB check on POD #1  - Encourage utilization of incentive spirometer     Alzheimer's Disease  Per daughter, patient's baseline mental status is alert to self only. She is at her baseline today.   Follows at [facility]  - Resume PTA seroquel, aricept, namenda, VPA      Hyperlipidemia   - Stable           Diet: NPO per Anesthesia Guidelines for Procedure/Surgery Except for: Meds    DVT Prophylaxis: Pneumatic Compression Devices  Du Catheter: Not present  Lines: None     Cardiac Monitoring: None  Code Status: Full Code      Clinically Significant Risk Factors Present on Admission                    # Dementia: noted on problem list    # Overweight: Estimated body mass  "index is 25.06 kg/m  as calculated from the following:    Height as of this encounter: 1.702 m (5' 7\").    Weight as of this encounter: 72.6 kg (160 lb).              Disposition Plan      Expected Discharge Date: 02/09/2024                The patient's care was discussed with the Attending Physician, Dr. Caballero and Patient's Family.    [facility]  Hospitalist Service  [facility]  Securely message with [employer] (more info)  Text page via [facility] Paging/Directory     ______________________________________________________________________    Chief Complaint   Right hip pain    History is obtained from the electronic health record, emergency department physician, and patient's daughter    History of Present Illness   Mildred Connelly is a 73 year old female admitted on 2/7/2024. Her past medical history is significant for only Alzheimer's Disease, for which she is followed at the [facility]. Today she presented by ambulance from her Memory Care Facility after being found down for an unknown period of time. It is unclear how she fell or how long she was lying on the floor. She was found to have a right hip fracture on x-ray in the emergency department. Other imaging, including CT head and cervical spine showed no acute injuries. . Other labs within normal limits. She is admitted today with plans for right hip surgery with orthopedics later today.     Seen in the ED by Dr. Talbot, workup as above.     Patient is mostly asleep during my evaluation today. Her daughter is at bedside. Daughter reports that patient has not previously had mobility issues and has no history of recurrent falls. She is not on anticoagulation or ASA. She resides in a memory care facility and is only alert to self at baseline. Discussed the plan for OR this evening with daughter, she is in agreement.       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 Taking?   QUEtiapine (SEROQUEL) 50 MG tablet 2/6/2024 at [facility] No Yes   Sig: Take 1 tablet (50 mg) by mouth at bedtime AND 0.5 tablets (25 mg) daily (with lunch). May also take 0.5 tablets (25 mg) 2 times daily as needed.   Patient taking differently: 50 mg twice a day   acetaminophen (TYLENOL) 500 MG tablet Unknown at [facility] Yes Yes   Sig: Take 1,000 mg by mouth every 6 hours as needed for mild pain   divalproex sodium delayed-release (DEPAKOTE) 125 MG DR tablet 2/6/2024 at [facility] Yes Yes   Sig: Take 125 mg by mouth 2 times daily   donepezil (ARICEPT) 5 MG tablet 2/6/2024 at 0[facility] Nursing Home Yes Yes   Sig: Take 5 mg by mouth every morning   escitalopram (LEXAPRO) 20 MG tablet 2/6/2024 at [facility] Nursing Home Yes Yes   Sig: Take 20 mg by mouth daily   memantine (NAMENDA) 10 MG tablet 2/6/2024 at [facility] Home Yes Yes   Sig: Take 10 mg by mouth 2 times daily   multivitamin (CENTRUM SILVER) tablet 2/6/2024 at 0[facility] Nursing Home Yes Yes   Sig: Take 1 tablet by mouth daily      Facility-Administered Medications: None        Review of Systems    The 10 point Review of Systems is negative other than noted in the HPI or here.     Social History   I have reviewed this patient's social history and updated it with pertinent information if  needed.  Social History     Tobacco Use    Smoking status: Former     Types: Cigarettes     Quit date:      Years since quittin.1    Smokeless tobacco: Never   Substance Use Topics    Alcohol use: Not Currently    Drug use: Never         Allergies   No Known Allergies     Physical Exam   Vital Signs: Temp: 98.4  F (36.9  C) Temp src: Oral BP: 112/56 Pulse: 62   Resp: 16 SpO2: 95 % O2 Device: None (Room air)    Weight: 160 lbs 0 oz    Physical Exam  Vitals and nursing note reviewed.   Constitutional:       General: She is sleeping. She is not in acute distress.     Appearance: Normal appearance. She is normal weight.   HENT:      Mouth/Throat:      Mouth: Mucous membranes are moist.   Cardiovascular:      Rate and Rhythm: Normal rate and regular rhythm.      Pulses: Normal pulses.           Dorsalis pedis pulses are 2+ on the right side and 2+ on the left side.      Heart sounds: Normal heart sounds. No murmur heard.     No friction rub. No gallop.   Pulmonary:      Effort: Pulmonary effort is normal.      Breath sounds: Normal breath sounds.   Abdominal:      General: There is no distension.      Palpations: Abdomen is soft.      Tenderness: There is no abdominal tenderness.   Musculoskeletal:      Right lower leg: No edema.      Left lower leg: No edema.      Comments: RLE shortened and externally rotated.    Skin:     General: Skin is warm and dry.      Capillary Refill: Capillary refill takes less than 2 seconds.   Neurological:      Mental Status: Mental status is at baseline.           Medical Decision Making       75 MINUTES SPENT BY ME on the date of service doing chart review, history, exam, documentation & further activities per the note.      Data     I have personally reviewed the following data over the past 24 hrs:    10.4  \   12.8   / 186     143 104 22.4 /  123 (H)   3.8 30 (H) 0.77 \       Imaging results reviewed over the past 24 hrs:   Recent Results (from the past 24 hour(s))   XR Pelvis  w Hip Right 1 View    Narrative    XR PELVIS AND HIP RIGHT 1 VIEW 2/7/2024 9:43 AM     HISTORY: right hip pain    COMPARISON: None.       Impression    IMPRESSION: Intertrochanteric fracture right hip. No evidence for  dislocation. Left hip negative for fracture. Pelvis negative for  fracture.    HEATHER CELAYA MD         SYSTEM ID:  [id number]   Head CT w/o contrast    Narrative    CT SCAN OF THE HEAD WITHOUT CONTRAST   2/7/2024 10:13 AM     HISTORY: Fall, unwitnessed, altered mental status.     TECHNIQUE:  Axial images of the head and coronal reformations without  IV contrast material. Radiation dose for this scan was reduced using  automated exposure control, adjustment of the mA and/or kV according  to patient size, or iterative reconstruction technique.    COMPARISON: None.    FINDINGS: There is no evidence of intracranial hemorrhage, mass, acute  infarct or anomaly. The ventricles are normal in size, shape and  configuration. Mild to moderate diffuse parenchymal volume loss. Mild  patchy periventricular white matter hypodensities which are  nonspecific, but likely related to chronic microvascular ischemic  disease. Atherosclerotic calcifications of the carotid siphons.    The visualized portions of the sinuses and mastoids appear normal. The  bony calvarium and bones of the skull base appear intact.       Impression    IMPRESSION:   1. No CT findings of acute intracranial process.  2. Brain atrophy and presumed chronic small vessel ischemic changes,  as described.     CHUNG BA MD         SYSTEM ID:  [id number]   CT Cervical Spine w/o Contrast    Narrative    CT CERVICAL SPINE WITHOUT CONTRAST   2/7/2024 10:14 AM     HISTORY: Unwitnessed fall.     TECHNIQUE: Axial images of the cervical spine were obtained without  intravenous contrast. Multiplanar reformations were performed.   Radiation dose for this scan was reduced using automated exposure  control, adjustment of the mA and/or kV according to patient  size, or  iterative reconstruction technique.    COMPARISON: None.    FINDINGS: No acute cervical spine fracture is identified. Normal  vertebral body heights. Slight reversal of the normal cervical  lordosis. Minimal anterolisthesis of C3 on C4. Mild levoconvex  curvature centered at the C7 level.    There is at least moderate disc height loss at C4-C5 and moderate to  severe/severe disc height loss C5-C6, C6-C7 and C7-T1. Mild disc  height loss C3-C4. Scattered marginal endplate and uncovertebral  spurs. Ankylosis across the C3-C4 facet joints. Mild/moderate  degenerative facet disease elsewhere. Degenerative changes of the  medial atlantoaxial joint are noted. Disc osteophyte complexes at  multiple levels. Moderate to severe spinal canal stenosis at C5-C6 and  C6-C7, and probably at least moderate spinal canal stenosis at C4-C5.  Lesser degrees of spinal canal narrowing elsewhere. Multilevel  degenerative neural foraminal stenosis, moderate/severe in degree on  the right at C4-C5, C5-C6, C6-C7, and C7-T1. There is also  moderate/moderate to severe neural foraminal stenosis on the left at  C4-C5 and C6-C7 and to a slightly lesser degree at C5-C6.    The visualized paraspinous soft tissues appear grossly unremarkable.      Impression    IMPRESSION:  1. No acute cervical spine fracture.  2. Multilevel degenerative changes, as described.  3. Varying degrees of spinal canal or neural foraminal stenosis,  including moderate to severe spinal canal narrowing at C5-C6 and C6-C7  and moderate to severe multilevel neural foraminal stenosis.     CHUNG BA MD         SYSTEM ID:  [id number]

## 2024-02-08 ENCOUNTER — ANESTHESIA EVENT (OUTPATIENT)
Dept: SURGERY | Facility: CLINIC | Age: 74
DRG: 481 | End: 2024-02-08
Payer: MEDICARE

## 2024-02-08 ENCOUNTER — APPOINTMENT (OUTPATIENT)
Dept: GENERAL RADIOLOGY | Facility: CLINIC | Age: 74
DRG: 481 | End: 2024-02-08
Attending: STUDENT IN AN ORGANIZED HEALTH CARE EDUCATION/TRAINING PROGRAM
Payer: MEDICARE

## 2024-02-08 ENCOUNTER — ANESTHESIA (OUTPATIENT)
Dept: SURGERY | Facility: CLINIC | Age: 74
DRG: 481 | End: 2024-02-08
Payer: MEDICARE

## 2024-02-08 LAB
ALBUMIN UR-MCNC: 70 MG/DL
APPEARANCE UR: CLEAR
BACTERIA #/AREA URNS HPF: ABNORMAL /HPF
BILIRUB UR QL STRIP: NEGATIVE
COLOR UR AUTO: YELLOW
GLUCOSE UR STRIP-MCNC: NEGATIVE MG/DL
HGB UR QL STRIP: ABNORMAL
HYALINE CASTS: 1 /LPF
KETONES UR STRIP-MCNC: 40 MG/DL
LEUKOCYTE ESTERASE UR QL STRIP: NEGATIVE
MAGNESIUM SERPL-MCNC: 2.1 MG/DL (ref 1.7–2.3)
MUCOUS THREADS #/AREA URNS LPF: PRESENT /LPF
NITRATE UR QL: NEGATIVE
PH UR STRIP: 5.5 [PH] (ref 5–7)
PHOSPHATE SERPL-MCNC: 3.2 MG/DL (ref 2.5–4.5)
POTASSIUM SERPL-SCNC: 4.1 MMOL/L (ref 3.4–5.3)
RBC URINE: 36 /HPF
SP GR UR STRIP: 1.03 (ref 1–1.03)
SQUAMOUS EPITHELIAL: 1 /HPF
UROBILINOGEN UR STRIP-MCNC: NORMAL MG/DL
WBC URINE: 4 /HPF

## 2024-02-08 PROCEDURE — 370N000017 HC ANESTHESIA TECHNICAL FEE, PER MIN: Performed by: STUDENT IN AN ORGANIZED HEALTH CARE EDUCATION/TRAINING PROGRAM

## 2024-02-08 PROCEDURE — 120N000001 HC R&B MED SURG/OB

## 2024-02-08 PROCEDURE — 250N000013 HC RX MED GY IP 250 OP 250 PS 637: Performed by: STUDENT IN AN ORGANIZED HEALTH CARE EDUCATION/TRAINING PROGRAM

## 2024-02-08 PROCEDURE — 250N000013 HC RX MED GY IP 250 OP 250 PS 637: Performed by: INTERNAL MEDICINE

## 2024-02-08 PROCEDURE — 0QS606Z REPOSITION RIGHT UPPER FEMUR WITH INTRAMEDULLARY INTERNAL FIXATION DEVICE, OPEN APPROACH: ICD-10-PCS | Performed by: STUDENT IN AN ORGANIZED HEALTH CARE EDUCATION/TRAINING PROGRAM

## 2024-02-08 PROCEDURE — 272N000001 HC OR GENERAL SUPPLY STERILE: Performed by: STUDENT IN AN ORGANIZED HEALTH CARE EDUCATION/TRAINING PROGRAM

## 2024-02-08 PROCEDURE — 250N000013 HC RX MED GY IP 250 OP 250 PS 637: Performed by: NURSE PRACTITIONER

## 2024-02-08 PROCEDURE — 83735 ASSAY OF MAGNESIUM: CPT | Performed by: STUDENT IN AN ORGANIZED HEALTH CARE EDUCATION/TRAINING PROGRAM

## 2024-02-08 PROCEDURE — 84100 ASSAY OF PHOSPHORUS: CPT | Performed by: STUDENT IN AN ORGANIZED HEALTH CARE EDUCATION/TRAINING PROGRAM

## 2024-02-08 PROCEDURE — 250N000009 HC RX 250: Performed by: NURSE ANESTHETIST, CERTIFIED REGISTERED

## 2024-02-08 PROCEDURE — 258N000003 HC RX IP 258 OP 636: Performed by: ANESTHESIOLOGY

## 2024-02-08 PROCEDURE — 250N000011 HC RX IP 250 OP 636: Performed by: ANESTHESIOLOGY

## 2024-02-08 PROCEDURE — 99232 SBSQ HOSP IP/OBS MODERATE 35: CPT | Performed by: INTERNAL MEDICINE

## 2024-02-08 PROCEDURE — 258N000003 HC RX IP 258 OP 636: Performed by: NURSE ANESTHETIST, CERTIFIED REGISTERED

## 2024-02-08 PROCEDURE — 999N000179 XR SURGERY CARM FLUORO LESS THAN 5 MIN W STILLS

## 2024-02-08 PROCEDURE — C1769 GUIDE WIRE: HCPCS | Performed by: STUDENT IN AN ORGANIZED HEALTH CARE EDUCATION/TRAINING PROGRAM

## 2024-02-08 PROCEDURE — 250N000011 HC RX IP 250 OP 636: Performed by: PHYSICIAN ASSISTANT

## 2024-02-08 PROCEDURE — 81001 URINALYSIS AUTO W/SCOPE: CPT | Performed by: NURSE PRACTITIONER

## 2024-02-08 PROCEDURE — 36415 COLL VENOUS BLD VENIPUNCTURE: CPT | Performed by: STUDENT IN AN ORGANIZED HEALTH CARE EDUCATION/TRAINING PROGRAM

## 2024-02-08 PROCEDURE — 710N000010 HC RECOVERY PHASE 1, LEVEL 2, PER MIN: Performed by: STUDENT IN AN ORGANIZED HEALTH CARE EDUCATION/TRAINING PROGRAM

## 2024-02-08 PROCEDURE — 250N000011 HC RX IP 250 OP 636: Performed by: NURSE ANESTHETIST, CERTIFIED REGISTERED

## 2024-02-08 PROCEDURE — 999N000141 HC STATISTIC PRE-PROCEDURE NURSING ASSESSMENT: Performed by: STUDENT IN AN ORGANIZED HEALTH CARE EDUCATION/TRAINING PROGRAM

## 2024-02-08 PROCEDURE — 250N000011 HC RX IP 250 OP 636: Performed by: STUDENT IN AN ORGANIZED HEALTH CARE EDUCATION/TRAINING PROGRAM

## 2024-02-08 PROCEDURE — 250N000025 HC SEVOFLURANE, PER MIN: Performed by: STUDENT IN AN ORGANIZED HEALTH CARE EDUCATION/TRAINING PROGRAM

## 2024-02-08 PROCEDURE — 360N000084 HC SURGERY LEVEL 4 W/ FLUORO, PER MIN: Performed by: STUDENT IN AN ORGANIZED HEALTH CARE EDUCATION/TRAINING PROGRAM

## 2024-02-08 PROCEDURE — C1713 ANCHOR/SCREW BN/BN,TIS/BN: HCPCS | Performed by: STUDENT IN AN ORGANIZED HEALTH CARE EDUCATION/TRAINING PROGRAM

## 2024-02-08 PROCEDURE — 84132 ASSAY OF SERUM POTASSIUM: CPT | Performed by: STUDENT IN AN ORGANIZED HEALTH CARE EDUCATION/TRAINING PROGRAM

## 2024-02-08 PROCEDURE — 250N000009 HC RX 250: Performed by: STUDENT IN AN ORGANIZED HEALTH CARE EDUCATION/TRAINING PROGRAM

## 2024-02-08 PROCEDURE — 250N000009 HC RX 250: Performed by: PHYSICIAN ASSISTANT

## 2024-02-08 DEVICE — 11MM/130 DEG TI CANN TFNA 170MM - STERILE
Type: IMPLANTABLE DEVICE | Site: HIP | Status: FUNCTIONAL
Brand: TFN-ADVANCE

## 2024-02-08 DEVICE — SCREW BN 34MM 5MM LCK X25 STRL IM NL 04.045.034S: Type: IMPLANTABLE DEVICE | Site: HIP | Status: FUNCTIONAL

## 2024-02-08 DEVICE — IMP SCR SYN TFNA FENESTRATED LAG 95MM 04.038.195S: Type: IMPLANTABLE DEVICE | Site: HIP | Status: FUNCTIONAL

## 2024-02-08 RX ORDER — FENTANYL CITRATE 50 UG/ML
INJECTION, SOLUTION INTRAMUSCULAR; INTRAVENOUS PRN
Status: DISCONTINUED | OUTPATIENT
Start: 2024-02-08 | End: 2024-02-08

## 2024-02-08 RX ORDER — BUPIVACAINE HYDROCHLORIDE 2.5 MG/ML
INJECTION, SOLUTION EPIDURAL; INFILTRATION; INTRACAUDAL
Status: COMPLETED | OUTPATIENT
Start: 2024-02-08 | End: 2024-02-08

## 2024-02-08 RX ORDER — MAGNESIUM HYDROXIDE 1200 MG/15ML
LIQUID ORAL PRN
Status: DISCONTINUED | OUTPATIENT
Start: 2024-02-08 | End: 2024-02-08 | Stop reason: HOSPADM

## 2024-02-08 RX ORDER — SODIUM CHLORIDE, SODIUM LACTATE, POTASSIUM CHLORIDE, CALCIUM CHLORIDE 600; 310; 30; 20 MG/100ML; MG/100ML; MG/100ML; MG/100ML
INJECTION, SOLUTION INTRAVENOUS CONTINUOUS
Status: DISCONTINUED | OUTPATIENT
Start: 2024-02-08 | End: 2024-02-08

## 2024-02-08 RX ORDER — MEPERIDINE HYDROCHLORIDE 25 MG/ML
12.5 INJECTION INTRAMUSCULAR; INTRAVENOUS; SUBCUTANEOUS EVERY 5 MIN PRN
Status: DISCONTINUED | OUTPATIENT
Start: 2024-02-08 | End: 2024-02-08 | Stop reason: HOSPADM

## 2024-02-08 RX ORDER — CEFAZOLIN SODIUM/WATER 2 G/20 ML
2 SYRINGE (ML) INTRAVENOUS
Status: COMPLETED | OUTPATIENT
Start: 2024-02-08 | End: 2024-02-08

## 2024-02-08 RX ORDER — LIDOCAINE 40 MG/G
CREAM TOPICAL
Status: DISCONTINUED | OUTPATIENT
Start: 2024-02-08 | End: 2024-02-14 | Stop reason: HOSPADM

## 2024-02-08 RX ORDER — CEFAZOLIN SODIUM 1 G/3ML
1 INJECTION, POWDER, FOR SOLUTION INTRAMUSCULAR; INTRAVENOUS EVERY 8 HOURS
Qty: 10 ML | Refills: 0 | Status: COMPLETED | OUTPATIENT
Start: 2024-02-08 | End: 2024-02-09

## 2024-02-08 RX ORDER — AMOXICILLIN 250 MG
1 CAPSULE ORAL 2 TIMES DAILY
Status: DISCONTINUED | OUTPATIENT
Start: 2024-02-08 | End: 2024-02-14 | Stop reason: HOSPADM

## 2024-02-08 RX ORDER — CEFAZOLIN SODIUM/WATER 2 G/20 ML
2 SYRINGE (ML) INTRAVENOUS SEE ADMIN INSTRUCTIONS
Status: DISCONTINUED | OUTPATIENT
Start: 2024-02-08 | End: 2024-02-08

## 2024-02-08 RX ORDER — PROCHLORPERAZINE MALEATE 5 MG
5 TABLET ORAL EVERY 6 HOURS PRN
Status: DISCONTINUED | OUTPATIENT
Start: 2024-02-08 | End: 2024-02-08

## 2024-02-08 RX ORDER — ONDANSETRON 4 MG/1
4 TABLET, ORALLY DISINTEGRATING ORAL EVERY 6 HOURS PRN
Status: DISCONTINUED | OUTPATIENT
Start: 2024-02-08 | End: 2024-02-08

## 2024-02-08 RX ORDER — TRANEXAMIC ACID 10 MG/ML
1 INJECTION, SOLUTION INTRAVENOUS ONCE
Status: COMPLETED | OUTPATIENT
Start: 2024-02-08 | End: 2024-02-08

## 2024-02-08 RX ORDER — PROPOFOL 10 MG/ML
INJECTION, EMULSION INTRAVENOUS PRN
Status: DISCONTINUED | OUTPATIENT
Start: 2024-02-08 | End: 2024-02-08

## 2024-02-08 RX ORDER — FENTANYL CITRATE 0.05 MG/ML
25 INJECTION, SOLUTION INTRAMUSCULAR; INTRAVENOUS EVERY 5 MIN PRN
Status: DISCONTINUED | OUTPATIENT
Start: 2024-02-08 | End: 2024-02-08 | Stop reason: HOSPADM

## 2024-02-08 RX ORDER — ONDANSETRON 2 MG/ML
4 INJECTION INTRAMUSCULAR; INTRAVENOUS EVERY 6 HOURS PRN
Status: DISCONTINUED | OUTPATIENT
Start: 2024-02-08 | End: 2024-02-08

## 2024-02-08 RX ORDER — BISACODYL 10 MG
10 SUPPOSITORY, RECTAL RECTAL DAILY PRN
Status: DISCONTINUED | OUTPATIENT
Start: 2024-02-08 | End: 2024-02-14 | Stop reason: HOSPADM

## 2024-02-08 RX ORDER — ASPIRIN 81 MG/1
81 TABLET ORAL 2 TIMES DAILY
Status: DISCONTINUED | OUTPATIENT
Start: 2024-02-08 | End: 2024-02-14 | Stop reason: HOSPADM

## 2024-02-08 RX ORDER — HYDROMORPHONE HCL IN WATER/PF 6 MG/30 ML
0.2 PATIENT CONTROLLED ANALGESIA SYRINGE INTRAVENOUS EVERY 5 MIN PRN
Status: DISCONTINUED | OUTPATIENT
Start: 2024-02-08 | End: 2024-02-08 | Stop reason: HOSPADM

## 2024-02-08 RX ORDER — ONDANSETRON 2 MG/ML
4 INJECTION INTRAMUSCULAR; INTRAVENOUS EVERY 30 MIN PRN
Status: DISCONTINUED | OUTPATIENT
Start: 2024-02-08 | End: 2024-02-08 | Stop reason: HOSPADM

## 2024-02-08 RX ORDER — ONDANSETRON 4 MG/1
4 TABLET, ORALLY DISINTEGRATING ORAL EVERY 30 MIN PRN
Status: DISCONTINUED | OUTPATIENT
Start: 2024-02-08 | End: 2024-02-08 | Stop reason: HOSPADM

## 2024-02-08 RX ORDER — POLYETHYLENE GLYCOL 3350 17 G/17G
17 POWDER, FOR SOLUTION ORAL DAILY
Status: DISCONTINUED | OUTPATIENT
Start: 2024-02-09 | End: 2024-02-14 | Stop reason: HOSPADM

## 2024-02-08 RX ORDER — SODIUM CHLORIDE, SODIUM LACTATE, POTASSIUM CHLORIDE, CALCIUM CHLORIDE 600; 310; 30; 20 MG/100ML; MG/100ML; MG/100ML; MG/100ML
INJECTION, SOLUTION INTRAVENOUS CONTINUOUS
Status: DISCONTINUED | OUTPATIENT
Start: 2024-02-08 | End: 2024-02-08 | Stop reason: HOSPADM

## 2024-02-08 RX ORDER — DEXAMETHASONE SODIUM PHOSPHATE 4 MG/ML
INJECTION, SOLUTION INTRA-ARTICULAR; INTRALESIONAL; INTRAMUSCULAR; INTRAVENOUS; SOFT TISSUE PRN
Status: DISCONTINUED | OUTPATIENT
Start: 2024-02-08 | End: 2024-02-08

## 2024-02-08 RX ORDER — SODIUM CHLORIDE, SODIUM LACTATE, POTASSIUM CHLORIDE, CALCIUM CHLORIDE 600; 310; 30; 20 MG/100ML; MG/100ML; MG/100ML; MG/100ML
INJECTION, SOLUTION INTRAVENOUS CONTINUOUS
Status: DISCONTINUED | OUTPATIENT
Start: 2024-02-08 | End: 2024-02-11

## 2024-02-08 RX ORDER — HYDROMORPHONE HCL IN WATER/PF 6 MG/30 ML
0.4 PATIENT CONTROLLED ANALGESIA SYRINGE INTRAVENOUS EVERY 5 MIN PRN
Status: DISCONTINUED | OUTPATIENT
Start: 2024-02-08 | End: 2024-02-08 | Stop reason: HOSPADM

## 2024-02-08 RX ORDER — ONDANSETRON 2 MG/ML
INJECTION INTRAMUSCULAR; INTRAVENOUS PRN
Status: DISCONTINUED | OUTPATIENT
Start: 2024-02-08 | End: 2024-02-08

## 2024-02-08 RX ORDER — FENTANYL CITRATE 0.05 MG/ML
50 INJECTION, SOLUTION INTRAMUSCULAR; INTRAVENOUS EVERY 5 MIN PRN
Status: DISCONTINUED | OUTPATIENT
Start: 2024-02-08 | End: 2024-02-08 | Stop reason: HOSPADM

## 2024-02-08 RX ORDER — LIDOCAINE HYDROCHLORIDE 20 MG/ML
INJECTION, SOLUTION INFILTRATION; PERINEURAL PRN
Status: DISCONTINUED | OUTPATIENT
Start: 2024-02-08 | End: 2024-02-08

## 2024-02-08 RX ADMIN — SODIUM CHLORIDE, POTASSIUM CHLORIDE, SODIUM LACTATE AND CALCIUM CHLORIDE: 600; 310; 30; 20 INJECTION, SOLUTION INTRAVENOUS at 12:45

## 2024-02-08 RX ADMIN — DONEPEZIL HYDROCHLORIDE 5 MG: 5 TABLET, FILM COATED ORAL at 10:04

## 2024-02-08 RX ADMIN — PHENYLEPHRINE HYDROCHLORIDE 0.5 MCG/KG/MIN: 10 INJECTION INTRAVENOUS at 13:39

## 2024-02-08 RX ADMIN — QUETIAPINE FUMARATE 50 MG: 50 TABLET ORAL at 10:03

## 2024-02-08 RX ADMIN — TRANEXAMIC ACID 1 G: 10 INJECTION, SOLUTION INTRAVENOUS at 13:58

## 2024-02-08 RX ADMIN — PHENYLEPHRINE HYDROCHLORIDE 100 MCG: 10 INJECTION INTRAVENOUS at 12:58

## 2024-02-08 RX ADMIN — PROPOFOL 20 MG: 10 INJECTION, EMULSION INTRAVENOUS at 13:29

## 2024-02-08 RX ADMIN — DEXAMETHASONE SODIUM PHOSPHATE 10 MG: 4 INJECTION, SOLUTION INTRA-ARTICULAR; INTRALESIONAL; INTRAMUSCULAR; INTRAVENOUS; SOFT TISSUE at 12:51

## 2024-02-08 RX ADMIN — OXYCODONE HYDROCHLORIDE 2.5 MG: 5 TABLET ORAL at 06:37

## 2024-02-08 RX ADMIN — ONDANSETRON 4 MG: 2 INJECTION INTRAMUSCULAR; INTRAVENOUS at 14:04

## 2024-02-08 RX ADMIN — Medication 2 G: at 12:47

## 2024-02-08 RX ADMIN — FENTANYL CITRATE 25 MCG: 50 INJECTION INTRAMUSCULAR; INTRAVENOUS at 13:29

## 2024-02-08 RX ADMIN — MEMANTINE 10 MG: 10 TABLET ORAL at 10:04

## 2024-02-08 RX ADMIN — HYDROMORPHONE HYDROCHLORIDE 0.2 MG: 0.2 INJECTION, SOLUTION INTRAMUSCULAR; INTRAVENOUS; SUBCUTANEOUS at 15:38

## 2024-02-08 RX ADMIN — DIVALPROEX SODIUM 125 MG: 125 TABLET, DELAYED RELEASE ORAL at 10:03

## 2024-02-08 RX ADMIN — ASPIRIN 81 MG: 81 TABLET, COATED ORAL at 21:21

## 2024-02-08 RX ADMIN — PHENYLEPHRINE HYDROCHLORIDE 100 MCG: 10 INJECTION INTRAVENOUS at 13:18

## 2024-02-08 RX ADMIN — PROPOFOL 30 MG: 10 INJECTION, EMULSION INTRAVENOUS at 13:02

## 2024-02-08 RX ADMIN — PROPOFOL 100 MG: 10 INJECTION, EMULSION INTRAVENOUS at 12:51

## 2024-02-08 RX ADMIN — LIDOCAINE HYDROCHLORIDE 100 MG: 20 INJECTION, SOLUTION INFILTRATION; PERINEURAL at 12:51

## 2024-02-08 RX ADMIN — PHENYLEPHRINE HYDROCHLORIDE 100 MCG: 10 INJECTION INTRAVENOUS at 12:54

## 2024-02-08 RX ADMIN — OXYCODONE HYDROCHLORIDE 2.5 MG: 5 TABLET ORAL at 01:12

## 2024-02-08 RX ADMIN — SENNOSIDES AND DOCUSATE SODIUM 1 TABLET: 50; 8.6 TABLET ORAL at 21:22

## 2024-02-08 RX ADMIN — PHENYLEPHRINE HYDROCHLORIDE 150 MCG: 10 INJECTION INTRAVENOUS at 13:34

## 2024-02-08 RX ADMIN — QUETIAPINE FUMARATE 50 MG: 50 TABLET ORAL at 21:22

## 2024-02-08 RX ADMIN — CEFAZOLIN 1 G: 1 INJECTION, POWDER, FOR SOLUTION INTRAMUSCULAR; INTRAVENOUS at 21:32

## 2024-02-08 RX ADMIN — SODIUM CHLORIDE, POTASSIUM CHLORIDE, SODIUM LACTATE AND CALCIUM CHLORIDE: 600; 310; 30; 20 INJECTION, SOLUTION INTRAVENOUS at 14:17

## 2024-02-08 RX ADMIN — MEMANTINE 10 MG: 10 TABLET ORAL at 21:22

## 2024-02-08 RX ADMIN — BUPIVACAINE HYDROCHLORIDE 30 ML: 2.5 INJECTION, SOLUTION EPIDURAL; INFILTRATION; INTRACAUDAL; PERINEURAL at 12:56

## 2024-02-08 RX ADMIN — PHENYLEPHRINE HYDROCHLORIDE 100 MCG: 10 INJECTION INTRAVENOUS at 13:24

## 2024-02-08 RX ADMIN — TRANEXAMIC ACID 1 G: 10 INJECTION, SOLUTION INTRAVENOUS at 12:56

## 2024-02-08 RX ADMIN — ESCITALOPRAM OXALATE 20 MG: 20 TABLET, FILM COATED ORAL at 10:04

## 2024-02-08 ASSESSMENT — ACTIVITIES OF DAILY LIVING (ADL)
ADLS_ACUITY_SCORE: 35

## 2024-02-08 NOTE — PLAN OF CARE
Trauma/Ortho/Medical (Choose one): Trauma     Diagnosis: R Hip Fracture   POD#: surgery tomorrow  Mental Status: A&O x1 (Alzheimer's)  Activity/dangle:  bedrest  Diet:  Regular, NPO after midnight  Pain:  Oxycodone, Robaxin, Tylenol available  Du/Voiding: pure wick  Tele/Restraints/Iso: none  02/LDA: room air, IV infusing  D/C Date: TBD  Other Info: sitter

## 2024-02-08 NOTE — ANESTHESIA PROCEDURE NOTES
"Adductor canal Procedure Note    Pre-Procedure   Staff -        Anesthesiologist:  Bettye Damian MD       Performed By: anesthesiologist       Location: OR       Procedure Start/Stop Times: 2/8/2024 12:56 PM and 2/8/2024 12:59 PM       Pre-Anesthestic Checklist: patient identified, IV checked, site marked, risks and benefits discussed, informed consent, monitors and equipment checked, pre-op evaluation, at physician/surgeon's request and post-op pain management  Timeout:       Correct Patient: Yes        Correct Procedure: Yes        Correct Site: Yes        Correct Position: Yes        Correct Laterality: Yes        Site Marked: Yes  Procedure Documentation  Procedure: Adductor canal       Laterality: left       Patient Position: supine       Patient Prep/Sterile Barriers: sterile gloves, mask, patient draped       Skin prep: Chloraprep       Needle Type: insulated       Needle Gauge: 20.        Needle Length (Inches): 4        Ultrasound guided       1. Ultrasound was used to identify targeted nerve, plexus, vascular marker, or fascial plane and place a needle adjacent to it in real-time.       2. Ultrasound was used to visualize the spread of anesthetic in close proximity to the above referenced structure.       3. A permanent image is entered into the patient's record.       4. The visualized anatomic structures appeared normal.       5. There were no apparent abnormal pathologic findings.    Assessment/Narrative         The placement was negative for: blood aspirated and painful injection       Bolus given via needle..        Secured via.        Insertion/Infusion Method: Single Shot    Medication(s) Administered   Bupivacaine 0.25% PF (Infiltration) - Infiltration   30 mL - 2/8/2024 12:56:00 PM  Medication Administration Time: 2/8/2024 12:56 PM      FOR [facility] ([facility]) ONLY:   Pain Team Contact information: please page the Pain Team Via [employer] Search \"Pain\". During daytime hours, please page the " attending first. At night please page the resident first.

## 2024-02-08 NOTE — ANESTHESIA PREPROCEDURE EVALUATION
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) 2019    Minimal cognitive impairment 10/04/2019    Mixed hyperlipidemia 2010    Onychomycosis 2009    Pneumonia 2014    Rosacea 2020    Skin cancer     Tibia/fibula fracture 2005      Past Surgical History:   Procedure Laterality Date    ENDOMETRIAL ABLATION  2004    MOHS MICROGRAPHIC PROCEDURE      Nose surgery    ORTHOPEDIC SURGERY        No Known Allergies   Social History     Tobacco Use    Smoking status: Former     Types: Cigarettes     Quit date:      Years since quittin.1    Smokeless tobacco: Never   Substance Use Topics    Alcohol use: Not Currently      Wt Readings from Last 1 Encounters:   24 72.6 kg (160 lb)        Anesthesia Evaluation   Pt has had prior anesthetic.     No history of anesthetic complications       ROS/MED HX  ENT/Pulmonary:  - neg pulmonary ROS     Neurologic:     (+)   dementia (Alzheimer's, patient with a 1 minute memory span),                             Cardiovascular:     (+) Dyslipidemia - -   -  - -                                      METS/Exercise Tolerance:     Hematologic:       Musculoskeletal:       GI/Hepatic:  - neg GI/hepatic ROS     Renal/Genitourinary:  - neg Renal ROS     Endo:       Psychiatric/Substance Use:       Infectious Disease:       Malignancy:       Other:            Physical Exam    Airway        Mallampati: II   TM distance: > 3 FB   Neck ROM: full   Mouth opening: > 3 cm    Respiratory Devices and Support         Dental           Cardiovascular          Rhythm and rate: regular and normal     Pulmonary            "breath sounds clear to auscultation           OUTSIDE LABS:  CBC:   Lab Results   Component Value Date    WBC 10.4 02/07/2024    WBC 13.4 (H) 05/19/2014    HGB 12.8 02/07/2024    HGB 13.4 05/19/2014    HCT 39.6 02/07/2024    HCT 39.5 05/19/2014     02/07/2024     05/19/2014     BMP:   Lab Results   Component Value Date     02/07/2024     05/20/2014    POTASSIUM 4.1 02/08/2024    POTASSIUM 3.8 02/07/2024    CHLORIDE 104 02/07/2024    CHLORIDE 106 05/20/2014    CO2 30 (H) 02/07/2024    CO2 25 05/20/2014    BUN 22.4 02/07/2024    BUN 11 05/20/2014    CR 0.77 02/07/2024    CR 0.54 05/20/2014     (H) 02/07/2024     (H) 05/20/2014     COAGS: No results found for: \"PTT\", \"INR\", \"FIBR\"  POC: No results found for: \"BGM\", \"HCG\", \"HCGS\"  HEPATIC: No results found for: \"ALBUMIN\", \"PROTTOTAL\", \"ALT\", \"AST\", \"GGT\", \"ALKPHOS\", \"BILITOTAL\", \"BILIDIRECT\", \"ERIKA\"  OTHER:   Lab Results   Component Value Date    EV 8.9 02/07/2024    PHOS 3.2 02/08/2024    MAG 2.1 02/08/2024       Anesthesia Plan    ASA Status:  3    NPO Status:  NPO Appropriate    Anesthesia Type: General.     - Airway: ETT              Consents    Anesthesia Plan(s) and associated risks, benefits, and realistic alternatives discussed. Questions answered and patient/representative(s) expressed understanding.     - Discussed: Risks, Benefits and Alternatives for BOTH SEDATION and the PROCEDURE were discussed     - Discussed with:  Patient            Postoperative Care            Comments:    Other Comments: Right fascia iliaca block after induction           Bettye Damian MD    I have reviewed the pertinent notes and labs in the chart from the past 30 days and (re)examined the patient.  Any updates or changes from those notes are reflected in this note.             "

## 2024-02-08 NOTE — ANESTHESIA PROCEDURE NOTES
Airway       Patient location during procedure: OR  Staff -        Anesthesiologist:  Bettye Damian MD       CRNA: Amelia Arriaga APRN CRNA       Performed By: CRNA  Consent for Airway        Urgency: elective  Indications and Patient Condition       Indications for airway management: alejandro-procedural       Induction type:intravenous       Mask difficulty assessment: 0 - not attempted    Final Airway Details       Final airway type: supraglottic airway    Supraglottic Airway Details        Type: LMA       Brand: I-Gel       LMA size: 4    Post intubation assessment        Placement verified by: capnometry, equal breath sounds and chest rise        Number of attempts at approach: 1       Secured with: tape       Ease of procedure: easy       Dentition: Intact and Unchanged

## 2024-02-08 NOTE — PLAN OF CARE
2-7-24  7pm    Trauma/Ortho/Medical (Choose one): Trauma    Diagnosis: R Hip Fracture   POD#: surgery tomorrow  Mental Status: A&O x1 (Alzheimer's)  Activity/dangle:  bedrest  Diet:  Regular, NPO after midnight  Pain:  Oxycodone, Robaxin, Tylenol available  Du/Voiding: pure wick  Tele/Restraints/Iso: none  02/LDA: room air, IV infusing  D/C Date: TBD  Other Info: K+, Mg, Phos replacement protocol, re-check tomorrow

## 2024-02-08 NOTE — OP NOTE
[facility]  Operative Note  Cephalomedullary Nailing      Mildred Connelly MRN# [id number]   YOB: 1950  Procedure Date:  2/8/2024  Age: 73 year old     PREOPERATIVE DIAGNOSIS:     1.  Displaced right intertrochanteric femur fracture.        POSTOPERATIVE DIAGNOSES:     1.  Displaced right intertrochanteric femur fracture.        PROCEDURE PERFORMED:  right hip cephalomedullary nailing, femur fracture.      SURGEON:  MIRTA CAMPA MD      ASSISTANT: Leonor Rosen PA-C who was critical for positioning patient, helping obtain reduction, retraction during exposure and implant placement, as well as closure.     ANESTHESIA:  General.      ESTIMATED BLOOD LOSS:  200 mL      IMPLANTS USED:    Implants:   Implant Name Type Inv. Item Serial No.  Lot No. LRB No. Used Action   IMP NAIL SYN CAN FEM PROX TFNA [id number] [id number] 04.037.142S - [id number] [employer]ware[employer] IMP NAIL SYN CAN FEM PROX TFNA [id number] 130D 04.037.142S  [employer] [id number] Right 1 Implanted   IMP SCR SYN TFNA FENESTRATED LAG 95MM 04.038.195S - [id number] Metallic Hardware/[employer] IMP SCR SYN TFNA FENESTRATED LAG 95MM [id number]  [employer] [id number] Right 1 Implanted   SCREW BN 34MM 5MM LCK X25 STRL IM NL 04.045.034S - [id number] Metallic Hardware/[employer] SCREW BN 34MM 5MM LCK X25 STRL IM NL [id number]  [employer] [id number] Right 1 Implanted          FINDINGS:  Mildred Connelly is a 73 year old-year-old female who sustained the above injury after a mechanical ground level fall.  A long discussion had regarding the nature of hip fractures, risks related to that and surgery discussed, included but not limited to bleeding, infection, damage to surrounding neurovascular structures, malunion, delayed union, nonunion, need for additional surgeries, blood clots that go to the heart, lung or brain, anesthetic complications or even death.  Discussed hip fracture mortality rates.  The patient and the family wished to proceed  and consent signed.      DESCRIPTION OF PROCEDURE:  The patient identified in the preoperative holding area per hospital policy and the correct operative site was marked.  General anesthesia induced, placed onto the [place] fracture table legs positioned in a scissor position.  All bony prominences well padded.  Slight traction and internal rotation reduced the fracture.  Chlorhexidine pre-scrub, ChloraPrep, standard drape.   Prior to incision, a critical pause was observed to identify the correct patient, correct procedure, site and side of surgery, implant availability and that appropriate imaging studies were available. I also confirmed that the patient received appropriate pre-operative prophylactic antibiotics.All in the room agreed to proceed.       A small incision was made just proximal to greater trochanter and this was dissected down the greater trochanter, the tip of the greater trochanter was palpated and I placed a guide pin confirming position AP and lateral views in the appropriate starting point.  Opening reamer was then utilized and the preoperatively templated cephalomedullary nail was placed.  Placed the guide wire into the center-center position in the femoral head/neck and reamed, measured and placed the screw.  Checked AP, lateral and 30-degree arcs in between to ensure safe position, as well as appropriate reduction.  Compressed.  Locked the interference screw proximally.  Outrigger removed.   Distal interlock placed.  Final x-rays showed appropriate reduction, safe position of the implants. Thoroughly irrigated wounds, closed deep layers, 0 Vicryl, 2-0 Vicryl in subcutaneous and staples for skin.  Sterile dressings applied. Patient was then transported to the PACU in good condition. All counts at the end of the case were correct.      POSTOPERATIVE PLAN:   1.  Weightbearing as tolerated with a walker x6 weeks.   2.  Deep venous thrombosis prophylaxis with ASA x6 weeks.   3.  Ancef x 24 hours  4.   Osteoporosis workup including calcium, vitamin D supplementation and followup with primary care doctor for additional treatment.   5.  Physical therapy.   6.  Pain control.  Minimize narcotics.      POSTOPERATIVE PLAN:  At two weeks, the staples can be removed. If all is going well, first followup could be 6 weeks with Dr. Dick [facility], with 2 views of the right femur, sooner if there is any difficulty.      MIRTA DICK MD

## 2024-02-08 NOTE — PROGRESS NOTES
Arrived from Recovery room.  Back to same room  Somnolent, looks comfortable.  Grimaced but tolerated turning in bed.   Ice pack applied to surgical area.  Pt's Daughter and son at the bedside.

## 2024-02-08 NOTE — ANESTHESIA POSTPROCEDURE EVALUATION
Patient: Mildred Connelly    Procedure: Procedure(s):  OPEN REDUCTION INTERNAL FIXATION RIGHT HIP FRACTURE WITH INTRAMEDULLARY NAIL.       Anesthesia Type:  General    Note:  Disposition: Inpatient   Postop Pain Control: Uneventful            Sign Out: Well controlled pain   PONV: No   Neuro/Psych: Uneventful            Sign Out: Acceptable/Baseline neuro status   Airway/Respiratory: Uneventful            Sign Out: Acceptable/Baseline resp. status   CV/Hemodynamics: Uneventful            Sign Out: Acceptable CV status   Other NRE: NONE   DID A NON-ROUTINE EVENT OCCUR?            Last vitals:  Vitals Value Taken Time   /57 02/08/24 1545   Temp 36.4  C (97.6  F) 02/08/24 1445   Pulse 74 02/08/24 1546   Resp 14 02/08/24 1546   SpO2 91 % 02/08/24 1546   Vitals shown include unfiled device data.    Electronically Signed By: Leandro Polanco MD  February 8, 2024  3:48 PM

## 2024-02-08 NOTE — ANESTHESIA CARE TRANSFER NOTE
Patient: Mildred Connelly    Procedure: Procedure(s):  OPEN REDUCTION INTERNAL FIXATION RIGHT HIP FRACTURE WITH INTRAMEDULLARY NAIL.       Diagnosis: Hip fracture, right, closed, initial encounter (H) [S72.001A]  Diagnosis Additional Information: No value filed.    Anesthesia Type:   General     Note:    Oropharynx: oropharynx clear of all foreign objects and spontaneously breathing  Level of Consciousness: drowsy  Oxygen Supplementation: face mask  Level of Supplemental Oxygen (L/min / FiO2): 6  Independent Airway: airway patency satisfactory and stable  Dentition: dentition unchanged  Vital Signs Stable: post-procedure vital signs reviewed and stable  Report to RN Given: handoff report given  Patient transferred to: PACU    Handoff Report: Identifed the Patient, Identified the Reponsible Provider, Reviewed the pertinent medical history, Discussed the surgical course, Reviewed Intra-OP anesthesia mangement and issues during anesthesia, Set expectations for post-procedure period and Allowed opportunity for questions and acknowledgement of understanding      Vitals:  Vitals Value Taken Time   /78 02/08/24 1441   Temp     Pulse 75 02/08/24 1443   Resp 15 02/08/24 1443   SpO2 95 % 02/08/24 1443   Vitals shown include unfiled device data.    Electronically Signed By: ERNST Catherine CRNA  February 8, 2024  2:44 PM

## 2024-02-08 NOTE — PROGRESS NOTES
"[facility]    Medicine Progress Note - Hospitalist Service    Date of Admission:  2/7/2024    Assessment & Plan      Mildred Connelly is a 73 year old female admitted on 2/7/2024. Her past medical history is significant for only Alzheimer's Disease, for which she is followed at the [facility]. Presented by ambulance from her Memory Care Facility after being found down for an unknown period of time. It is unclear how she fell or exactly how long she was lying on the floor though suspected to not be extremely long. She was found to have a right hip fracture on x-ray in the emergency department. Other imaging, including CT head and cervical spine showed no acute injuries. .     Right hip intertrochanteric fracture   Unwitnessed fall, suspected mechanical:    - NPO this AM for surgery.  Appreciate orthopedics service assistance.  Post-op site cares, activity restrictions, pain medications, DVT proph per orthopedics.  - NWB/Bedrest until surgery   - HGB check on POD #1    Alzheimer's Disease:  Per daughter, patient's baseline mental status is alert to self only. She is at her baseline today. Follows at [facility].  - Continue PTA seroquel, aricept, namenda, VPA      Hyperlipidemia   - Stable     Medical Decision Making       40 MINUTES SPENT BY ME on the date of service doing chart review, history, exam, documentation & further activities per the note.      Labs/Imaging Reviewed:  See Information above and Data section below    PPE Used:  Mask       Diet: NPO per Anesthesia Guidelines for Procedure/Surgery Except for: Meds    DVT Prophylaxis: Defer to orthopedics after surgery  Du Catheter: Not present  Lines: None     Cardiac Monitoring: None  Code Status: Full Code      Clinically Significant Risk Factors Present on Admission                      # Overweight: Estimated body mass index is 25.06 kg/m  as calculated from the following:    Height as of this encounter: 1.702 m (5' 7\").    " Weight as of this encounter: 72.6 kg (160 lb).              Disposition Plan     Expected Discharge Date: 02/09/2024                  Humphrey Romeo DO  Hospitalist Service  [facility]  Securely message with [employer] (more info)  Text page via [employer] Paging/Directory   ______________________________________________________________________    Interval History   Assumed care, history reviewed.  Ms. Connelly feels well in bed/not moving this AM.  Denies any pain at rest.  No nausea, sob, other concerns.  She doesn't remember how she fell/what happened.    Physical Exam   Vital Signs: Temp: 99.5  F (37.5  C) Temp src: Axillary BP: 116/60 Pulse: 84   Resp: 16 SpO2: 93 % O2 Device: None (Room air) Oxygen Delivery: 2 LPM  Weight: 160 lbs 0 oz    GEN:  Alert, oriented to self, somewhat to situation.  Appears comfortable, no overt distress.  HEENT:  Normocephalic/atraumatic, no scleral icterus, no nasal discharge, mouth moist.  CV:  Regular rate and rhythm, no loud murmur/rub.  LUNGS:  Clear to auscultation bilaterally without rales/rhonchi/wheezing/retractions.  Symmetric chest rise on inhalation noted.  ABD:  Active bowel sounds, soft, non-tender/non-distended.  No guarding/rigidity.  EXT:  No edema.  No cyanosis.  Moving feet well with sensation to touch grossly intact.  SKIN:  Dry to touch, no exanthems noted in the visualized areas.    Medications    No Medication Sleep Aids for this Patient      sodium chloride 100 mL/hr at 02/07/24 1426      divalproex sodium delayed-release  125 mg Oral BID    donepezil  5 mg Oral QAM    escitalopram  20 mg Oral Daily    memantine  10 mg Oral BID    QUEtiapine  50 mg Oral BID    sodium chloride (PF)  3 mL Intracatheter Q8H    cholecalciferol  50 mcg Oral Daily       Data     Labs and Imaging results below reviewed today.    Recent Labs   Lab 02/07/24  0901   WBC 10.4   HGB 12.8   HCT 39.6   MCV 98        Recent Labs   Lab 02/08/24  0628 02/07/24  8803  02/07/24  0901   NA  --   --  143   POTASSIUM 4.1  --  3.8   CHLORIDE  --   --  104   CO2  --   --  30*   ANIONGAP  --   --  9   GLC  --   --  123*   BUN  --   --  22.4   CR  --   --  0.77   GFRESTIMATED  --   --  82   EV  --   --  8.9   MAG 2.1 2.1  --    PHOS 3.2 4.1  --        Recent Results (from the past 24 hour(s))   XR Pelvis w Hip Right 1 View    Narrative    XR PELVIS AND HIP RIGHT 1 VIEW 2/7/2024 9:43 AM     HISTORY: right hip pain    COMPARISON: None.       Impression    IMPRESSION: Intertrochanteric fracture right hip. No evidence for  dislocation. Left hip negative for fracture. Pelvis negative for  fracture.    HEATHER CELAYA MD         SYSTEM ID:  [id number]   Head CT w/o contrast    Narrative    CT SCAN OF THE HEAD WITHOUT CONTRAST   2/7/2024 10:13 AM     HISTORY: Fall, unwitnessed, altered mental status.     TECHNIQUE:  Axial images of the head and coronal reformations without  IV contrast material. Radiation dose for this scan was reduced using  automated exposure control, adjustment of the mA and/or kV according  to patient size, or iterative reconstruction technique.    COMPARISON: None.    FINDINGS: There is no evidence of intracranial hemorrhage, mass, acute  infarct or anomaly. The ventricles are normal in size, shape and  configuration. Mild to moderate diffuse parenchymal volume loss. Mild  patchy periventricular white matter hypodensities which are  nonspecific, but likely related to chronic microvascular ischemic  disease. Atherosclerotic calcifications of the carotid siphons.    The visualized portions of the sinuses and mastoids appear normal. The  bony calvarium and bones of the skull base appear intact.       Impression    IMPRESSION:   1. No CT findings of acute intracranial process.  2. Brain atrophy and presumed chronic small vessel ischemic changes,  as described.     CHUNG BA MD         SYSTEM ID:  [id number]   CT Cervical Spine w/o Contrast    Narrative    CT CERVICAL SPINE  WITHOUT CONTRAST   2/7/2024 10:14 AM     HISTORY: Unwitnessed fall.     TECHNIQUE: Axial images of the cervical spine were obtained without  intravenous contrast. Multiplanar reformations were performed.   Radiation dose for this scan was reduced using automated exposure  control, adjustment of the mA and/or kV according to patient size, or  iterative reconstruction technique.    COMPARISON: None.    FINDINGS: No acute cervical spine fracture is identified. Normal  vertebral body heights. Slight reversal of the normal cervical  lordosis. Minimal anterolisthesis of C3 on C4. Mild levoconvex  curvature centered at the C7 level.    There is at least moderate disc height loss at C4-C5 and moderate to  severe/severe disc height loss C5-C6, C6-C7 and C7-T1. Mild disc  height loss C3-C4. Scattered marginal endplate and uncovertebral  spurs. Ankylosis across the C3-C4 facet joints. Mild/moderate  degenerative facet disease elsewhere. Degenerative changes of the  medial atlantoaxial joint are noted. Disc osteophyte complexes at  multiple levels. Moderate to severe spinal canal stenosis at C5-C6 and  C6-C7, and probably at least moderate spinal canal stenosis at C4-C5.  Lesser degrees of spinal canal narrowing elsewhere. Multilevel  degenerative neural foraminal stenosis, moderate/severe in degree on  the right at C4-C5, C5-C6, C6-C7, and C7-T1. There is also  moderate/moderate to severe neural foraminal stenosis on the left at  C4-C5 and C6-C7 and to a slightly lesser degree at C5-C6.    The visualized paraspinous soft tissues appear grossly unremarkable.      Impression    IMPRESSION:  1. No acute cervical spine fracture.  2. Multilevel degenerative changes, as described.  3. Varying degrees of spinal canal or neural foraminal stenosis,  including moderate to severe spinal canal narrowing at C5-C6 and C6-C7  and moderate to severe multilevel neural foraminal stenosis.     CHUNG BA MD         SYSTEM ID:  [id number]

## 2024-02-09 ENCOUNTER — APPOINTMENT (OUTPATIENT)
Dept: PHYSICAL THERAPY | Facility: CLINIC | Age: 74
DRG: 481 | End: 2024-02-09
Attending: STUDENT IN AN ORGANIZED HEALTH CARE EDUCATION/TRAINING PROGRAM
Payer: MEDICARE

## 2024-02-09 LAB
ANION GAP SERPL CALCULATED.3IONS-SCNC: 10 MMOL/L (ref 7–15)
BUN SERPL-MCNC: 16.3 MG/DL (ref 8–23)
CALCIUM SERPL-MCNC: 8.6 MG/DL (ref 8.8–10.2)
CHLORIDE SERPL-SCNC: 104 MMOL/L (ref 98–107)
CREAT SERPL-MCNC: 0.6 MG/DL (ref 0.51–0.95)
DEPRECATED HCO3 PLAS-SCNC: 28 MMOL/L (ref 22–29)
EGFRCR SERPLBLD CKD-EPI 2021: >90 ML/MIN/1.73M2
GLUCOSE BLDC GLUCOMTR-MCNC: 122 MG/DL (ref 70–99)
GLUCOSE SERPL-MCNC: 115 MG/DL (ref 70–99)
HGB BLD-MCNC: 10.4 G/DL (ref 11.7–15.7)
MAGNESIUM SERPL-MCNC: 2.2 MG/DL (ref 1.7–2.3)
PHOSPHATE SERPL-MCNC: 2.5 MG/DL (ref 2.5–4.5)
POTASSIUM SERPL-SCNC: 3.9 MMOL/L (ref 3.4–5.3)
SODIUM SERPL-SCNC: 142 MMOL/L (ref 135–145)

## 2024-02-09 PROCEDURE — 250N000013 HC RX MED GY IP 250 OP 250 PS 637: Performed by: INTERNAL MEDICINE

## 2024-02-09 PROCEDURE — 99231 SBSQ HOSP IP/OBS SF/LOW 25: CPT | Performed by: INTERNAL MEDICINE

## 2024-02-09 PROCEDURE — 85018 HEMOGLOBIN: CPT | Performed by: STUDENT IN AN ORGANIZED HEALTH CARE EDUCATION/TRAINING PROGRAM

## 2024-02-09 PROCEDURE — 82565 ASSAY OF CREATININE: CPT | Performed by: INTERNAL MEDICINE

## 2024-02-09 PROCEDURE — 97161 PT EVAL LOW COMPLEX 20 MIN: CPT | Mod: GP

## 2024-02-09 PROCEDURE — 250N000011 HC RX IP 250 OP 636: Performed by: STUDENT IN AN ORGANIZED HEALTH CARE EDUCATION/TRAINING PROGRAM

## 2024-02-09 PROCEDURE — 999N000111 HC STATISTIC OT IP EVAL DEFER

## 2024-02-09 PROCEDURE — 250N000013 HC RX MED GY IP 250 OP 250 PS 637: Performed by: STUDENT IN AN ORGANIZED HEALTH CARE EDUCATION/TRAINING PROGRAM

## 2024-02-09 PROCEDURE — 84100 ASSAY OF PHOSPHORUS: CPT | Performed by: STUDENT IN AN ORGANIZED HEALTH CARE EDUCATION/TRAINING PROGRAM

## 2024-02-09 PROCEDURE — 120N000001 HC R&B MED SURG/OB

## 2024-02-09 PROCEDURE — 97530 THERAPEUTIC ACTIVITIES: CPT | Mod: GP

## 2024-02-09 PROCEDURE — 82374 ASSAY BLOOD CARBON DIOXIDE: CPT | Performed by: INTERNAL MEDICINE

## 2024-02-09 PROCEDURE — 83735 ASSAY OF MAGNESIUM: CPT | Performed by: STUDENT IN AN ORGANIZED HEALTH CARE EDUCATION/TRAINING PROGRAM

## 2024-02-09 PROCEDURE — 36415 COLL VENOUS BLD VENIPUNCTURE: CPT | Performed by: STUDENT IN AN ORGANIZED HEALTH CARE EDUCATION/TRAINING PROGRAM

## 2024-02-09 RX ADMIN — SENNOSIDES AND DOCUSATE SODIUM 1 TABLET: 50; 8.6 TABLET ORAL at 09:06

## 2024-02-09 RX ADMIN — ASPIRIN 81 MG: 81 TABLET, COATED ORAL at 20:25

## 2024-02-09 RX ADMIN — ACETAMINOPHEN 650 MG: 325 TABLET, FILM COATED ORAL at 20:28

## 2024-02-09 RX ADMIN — DIVALPROEX SODIUM 125 MG: 125 TABLET, DELAYED RELEASE ORAL at 09:06

## 2024-02-09 RX ADMIN — ASPIRIN 81 MG: 81 TABLET, COATED ORAL at 09:06

## 2024-02-09 RX ADMIN — Medication 50 MCG: at 09:04

## 2024-02-09 RX ADMIN — SENNOSIDES AND DOCUSATE SODIUM 1 TABLET: 50; 8.6 TABLET ORAL at 20:26

## 2024-02-09 RX ADMIN — MEMANTINE 10 MG: 10 TABLET ORAL at 20:26

## 2024-02-09 RX ADMIN — QUETIAPINE FUMARATE 50 MG: 50 TABLET ORAL at 20:25

## 2024-02-09 RX ADMIN — DIVALPROEX SODIUM 125 MG: 125 TABLET, DELAYED RELEASE ORAL at 13:20

## 2024-02-09 RX ADMIN — MEMANTINE 10 MG: 10 TABLET ORAL at 09:06

## 2024-02-09 RX ADMIN — Medication 1 MG: at 20:26

## 2024-02-09 RX ADMIN — POLYETHYLENE GLYCOL 3350 17 G: 17 POWDER, FOR SOLUTION ORAL at 09:06

## 2024-02-09 RX ADMIN — CEFAZOLIN 1 G: 1 INJECTION, POWDER, FOR SOLUTION INTRAMUSCULAR; INTRAVENOUS at 04:49

## 2024-02-09 RX ADMIN — DONEPEZIL HYDROCHLORIDE 5 MG: 5 TABLET, FILM COATED ORAL at 09:05

## 2024-02-09 RX ADMIN — ACETAMINOPHEN 650 MG: 325 TABLET, FILM COATED ORAL at 15:15

## 2024-02-09 RX ADMIN — QUETIAPINE FUMARATE 50 MG: 50 TABLET ORAL at 09:05

## 2024-02-09 RX ADMIN — ESCITALOPRAM OXALATE 20 MG: 20 TABLET, FILM COATED ORAL at 09:06

## 2024-02-09 ASSESSMENT — ACTIVITIES OF DAILY LIVING (ADL)
ADLS_ACUITY_SCORE: 36
ADLS_ACUITY_SCORE: 36
ADLS_ACUITY_SCORE: 37
ADLS_ACUITY_SCORE: 37
ADLS_ACUITY_SCORE: 36
ADLS_ACUITY_SCORE: 37
ADLS_ACUITY_SCORE: 42
ADLS_ACUITY_SCORE: 37
ADLS_ACUITY_SCORE: 37
ADLS_ACUITY_SCORE: 36
ADLS_ACUITY_SCORE: 36
ADLS_ACUITY_SCORE: 37

## 2024-02-09 NOTE — PROGRESS NOTES
"   02/09/24 1200   Appointment Info   Signing Clinician's Name / Credentials (PT) Petra Gutierrez DPT   Rehab Comments (PT) WBAT L LE   Living Environment   People in Home facility resident   Current Living Arrangements assisted living  (Memory care)   Home Accessibility no concerns;wheelchair accessible   Transportation Anticipated family or friend will provide   Living Environment Comments Pt lives in a memory care unit, no stairs within the home. Pt has a very supportive daughter who visits often   Self-Care   Usual Activity Tolerance good   Current Activity Tolerance poor   Regular Exercise Yes   Activity/Exercise Type walking   Exercise Amount/Frequency daily   Equipment Currently Used at Home none   Fall history within last six months yes   Number of times patient has fallen within last six months 1   Activity/Exercise/Self-Care Comment Pt is typically IND for all functional mobility without an AD at baseline.   General Information   Onset of Illness/Injury or Date of Surgery 02/07/24   Referring Physician Robina Caballero MD   Patient/Family Therapy Goals Statement (PT) \"to go back home\"   Pertinent History of Current Problem (include personal factors and/or comorbidities that impact the POC) Pt is a 73 year old female in memory care for Alzheimer's disease who presents after an unwitnessed fall and s/p R ORIF on 2/8/2024.  The son notes that Mildred broke her right ankle 15-20 years ago, and has a metal shayne in place. Pt has a 1 minute short-term memory cycle, but recognizes her children.   Existing Precautions/Restrictions fall;weight bearing   Weight-Bearing Status - LUE full weight-bearing   Weight-Bearing Status - RUE full weight-bearing   Weight-Bearing Status - LLE full weight-bearing   Weight-Bearing Status - RLE weight-bearing as tolerated   Cognition   Affect/Mental Status (Cognition) confused   Orientation Status (Cognition) oriented to;person   Follows Commands (Cognition) follows one-step " commands;repetition of directions required;increased processing time needed;verbal cues/prompting required   Pain Assessment   Patient Currently in Pain Yes, see Vital Sign flowsheet  (Pt reports pain in R hip)   Integumentary/Edema   Integumentary/Edema other (describe)   Integumentary/Edema Comments Pt has bandage over R hip incision   Posture    Posture Forward head position;Protracted shoulders   Range of Motion (ROM)   Range of Motion ROM deficits secondary to surgical procedure;ROM deficits secondary to pain;ROM deficits secondary to swelling;ROM deficits secondary to weakness   Strength (Manual Muscle Testing)   Strength (Manual Muscle Testing) Able to perform L SLR;Deficits observed during functional mobility   Bed Mobility   Comment, (Bed Mobility) Supine>sitting EOB completed w/ Mod Ax1   Transfers   Comment, (Transfers) Sit>stand completed w/ Mod-Max Ax1   Gait/Stairs (Locomotion)   Comment, (Gait/Stairs) Pt unable to ambulate at this time   Balance   Balance other (describe)   Balance Comments Pt demonstrated impaired sitting balance and L sded lean due to increased pain in R hip, required Min Ax1 to maintain upright posture at EOB. Pt required FWW for all dynamic balance at this time   Sensory Examination   Sensory Perception patient reports no sensory changes   Coordination   Coordination no deficits were identified   Muscle Tone   Muscle Tone no deficits were identified   Clinical Impression   Criteria for Skilled Therapeutic Intervention Yes, treatment indicated   PT Diagnosis (PT) Impaired functional mobility   Influenced by the following impairments Post-surgical pain and precautions, weakness, impaired activity tolerance   Functional limitations due to impairments Impaired gait and inability to complete ADL's   Clinical Presentation (PT Evaluation Complexity) stable   Clinical Presentation Rationale Clinical Judgment   Clinical Decision Making (Complexity) low complexity   Planned Therapy  Interventions (PT) balance training;bed mobility training;gait training;home exercise program;motor coordination training;neuromuscular re-education;patient/family education;postural re-education;ROM (range of motion);stair training;strengthening;stretching;transfer training;progressive activity/exercise;risk factor education;home program guidelines   Risk & Benefits of therapy have been explained care plan/treatment goals reviewed;evaluation/treatment results reviewed;risks/benefits reviewed;participants voiced agreement with care plan;current/potential barriers reviewed;participants included;patient;daughter   PT Total Evaluation Time   PT Eval, Low Complexity Minutes (02869) 10   Physical Therapy Goals   PT Frequency Daily   PT Predicted Duration/Target Date for Goal Attainment 02/16/24   PT Goals Bed Mobility;Transfers;Gait   PT: Bed Mobility Supervision/stand-by assist;Supine to/from sit;Rolling;Bridging;Within precautions   PT: Transfers Supervision/stand-by assist;Sit to/from stand;Bed to/from chair;Assistive device;Within precautions   PT: Gait Supervision/stand-by assist;Rolling walker;Within precautions;150 feet   Interventions   Interventions Quick Adds Therapeutic Activity;Therapeutic Procedure   Therapeutic Procedure/Exercise   Ther. Procedure: strength, endurance, ROM, flexibillity Minutes (61985) 5   Treatment Detail/Skilled Intervention Pt instructed in and completed supine ankle pumps x 10 reps each LE in order to increase LE circulation.   Therapeutic Activity   Therapeutic Activities: dynamic activities to improve functional performance Minutes (42952) 30   Treatment Detail/Skilled Intervention Evaluation completed and treatment indicated. Increased time neede throughout session for reorientation and repetition of cues ue to pt's impaired cogntion. Spine>sitting EOB completed w/ Mod Ax1, cues given for UE support on bed rails while completing. Pt required Min Ax1 to maintain sitting balance at  EOB, pt demonstrated L sided lean due to increased pain in L LE.  Sit>stand completed w/ Mod-Max Ax1, cues given to push up from chair rather than FWW when moving to standing. Once standing w/ FWW, pt cued for weight shifting side to side, able to complete w/ Min Ax1. Pt then cued for marching in place, unable to complete at this time due to pain in R LE. Pt required Mod Ax1 for sit>supine, cued for technique. Mod Ax2 required for boost in bed. Pt and daughter educated on discharge planning. Pt left supine in bed with all needs in reach and bed alarm on. RN notified.   PT Discharge Planning   PT Plan Progress bed mobility, repeated sit>stands w/ FWW, trial stand pivot transfer and gait as able   PT Discharge Recommendation (DC Rec) home with assist;home with home care physical therapy;Transitional Care Facility   PT Rationale for DC Rec Pt is moving significantly below baseline, is typically IND at baseline without an AD, currently requiring heavy Ax1 in order to stand w/ FWW, unable to take any steps at this time. Per daughter, pt should be able to return home to memory care with Ax2 as needed. Daughter would prefer this and HCPT as she is worried about pt's cognitive decline if transferred to a TCU. If pt is able to have Ax2 and HCPT at memory care then can return home with assist and HCPT. If pt is unable to have this level of assist at memory care, would recommend TCU at discharge.   PT Brief overview of current status Mod-Max Ax1 bed mobility and sit>stand w/ FWW   PT Equipment Needed at Discharge walker, rolling   Total Session Time   Timed Code Treatment Minutes 35   Total Session Time (sum of timed and untimed services) 45

## 2024-02-09 NOTE — PROGRESS NOTES
[facility]    Medicine Progress Note - Hospitalist Service    Date of Admission:  2/7/2024    Assessment & Plan      Mildred Connelly is a 73 year old female admitted on 2/7/2024. Her past medical history is significant for only Alzheimer's Disease, for which she is followed at the [facility]. Presented by ambulance from her Memory Care Facility after being found down for an unknown period of time. It is unclear how she fell or exactly how long she was lying on the floor though suspected to not be extremely long. She was found to have a right hip fracture on x-ray in the emergency department. Other imaging, including CT head and cervical spine showed no acute injuries. .     Right hip intertrochanteric fracture s/p ORIF 2/8/24  Unwitnessed fall, suspected mechanical  Acute blood loss anemia secondary to fracture/surgery:    - Appears to be doing well post-op.  Post-op site cares, activity restrictions, pain medications, DVT proph per orthopedics.  Modest hgb decline with surgery, no indication for a current transfusion.  -  Appears will need TCU/rehab, memory care likely cannot accommodate her with her short term mobility challenges.  -  Asked staff to try to wean off O2, O2 high 90's when I saw her on current O2.  Encourage more activity/incentive spirometry use.    Alzheimer's Disease:  Per daughter, patient's baseline mental status is alert to self only. She is at her baseline. Follows at [facility].  She answers basic questions well.  - Continue PTA seroquel, aricept, namenda, VPA      Hyperlipidemia   - Stable     Medical Decision Making       40 MINUTES SPENT BY ME on the date of service doing chart review, history, exam, documentation & further activities per the note.      Labs/Imaging Reviewed:  See Information above and Data section below    PPE Used:  Mask       Diet: Advance Diet as Tolerated: Regular Diet Adult    DVT Prophylaxis: Defer to orthopedics after surgery  Florian  "Catheter: PRESENT, indication: Retention  Lines: None     Cardiac Monitoring: None  Code Status: Full Code      Clinically Significant Risk Factors                        # Overweight: Estimated body mass index is 25.06 kg/m  as calculated from the following:    Height as of this encounter: 1.702 m (5' 7\").    Weight as of this encounter: 72.6 kg (160 lb)., PRESENT ON ADMISSION            Disposition Plan     Expected Discharge Date: 02/12/2024                  Humphrey Romeo, DO  Hospitalist Service  [facility]  Securely message with [employer] (more info)  Text page via [facility] Paging/Directory   ______________________________________________________________________    Interval History   Ms. Connelly feels well, denied any major pain.  Denied nausea or SOB.  Family present things she is doing well.       Physical Exam   Vital Signs: Temp: 98.9  F (37.2  C) Temp src: Axillary BP: 117/86 Pulse: 86   Resp: 18 SpO2: 90 % O2 Device: None (Room air) Oxygen Delivery: 5 LPM  Weight: 160 lbs 0 oz    GEN:  Alert, oriented to self, somewhat to situation.  Answers basic questions well.  Appears awake/energetic.  Appears comfortable, no overt distress.   HEENT:  Normocephalic/atraumatic, no scleral icterus, no nasal discharge, mouth moist.  CV:  Regular rate and rhythm, no loud murmur/rub.  LUNGS:  Clear to auscultation bilaterally without rales/rhonchi/wheezing/retractions.  Symmetric chest rise on inhalation noted.  ABD:  Active bowel sounds, soft, non-tender/non-distended.  No guarding/rigidity.  EXT:  No edema.  No cyanosis.  Moving feet well with sensation to touch grossly intact.  Detailed surgical site exam deferred to orthopedics.  SKIN:  Dry to touch, no exanthems noted in the visualized areas.    Medications    lactated ringers Stopped (02/09/24 4808)    No Medication Sleep Aids for this Patient        aspirin  81 mg Oral BID    divalproex sodium delayed-release  125 mg Oral BID    donepezil  5 mg " Oral QAM    escitalopram  20 mg Oral Daily    memantine  10 mg Oral BID    polyethylene glycol  17 g Oral Daily    QUEtiapine  50 mg Oral BID    senna-docusate  1 tablet Oral BID    sodium chloride (PF)  3 mL Intracatheter Q8H    cholecalciferol  50 mcg Oral Daily       Data     Labs and Imaging results below reviewed today.    Recent Labs   Lab 02/09/24 0801 02/07/24 0901   WBC  --  10.4   HGB 10.4* 12.8   HCT  --  39.6   MCV  --  98   PLT  --  186     Recent Labs   Lab 02/09/24  0801 02/08/24  0628 02/07/24  1712 02/07/24 0901     --   --  143   POTASSIUM 3.9 4.1  --  3.8   CHLORIDE 104  --   --  104   CO2 28  --   --  30*   ANIONGAP 10  --   --  9   *  --   --  123*   BUN 16.3  --   --  22.4   CR 0.60  --   --  0.77   GFRESTIMATED >90  --   --  82   EV 8.6*  --   --  8.9   MAG 2.2 2.1 2.1  --    PHOS 2.5 3.2 4.1  --        No results found for this or any previous visit (from the past 24 hour(s)).

## 2024-02-09 NOTE — PROGRESS NOTES
Orthopedic Surgery  Mildred Connelly  02/09/2024     Admit Date:  2/7/2024    POD: 1 Day Post-Op   Procedure(s):  OPEN REDUCTION INTERNAL FIXATION RIGHT HIP FRACTURE WITH INTRAMEDULLARY NAIL.    Baseline advanced Alzheimer's dementia.  Patient sleeping comfortably in bed throughout encounter. She does not wake to voice or tactile stimuli.  Pain seemingly controlled.  Supplemental O2 in place via mask.    Tolerating oral intake.    No acute events overnight.    Temp:  [97.5  F (36.4  C)-98.7  F (37.1  C)] 98.4  F (36.9  C)  Pulse:  [70-85] 76  Resp:  [12-23] 16  BP: ()/(47-99) 94/47  SpO2:  [90 %-95 %] 93 %    Sleeping. Does not participate in exam. NAD.  Right hip bulky dressing is clean, dry, and intact.   Minimal erythema of the surrounding skin.   Mild swelling to the right lateral hip and thigh.   Thigh remains soft and compressible.  Bilateral calves are soft, seemingly non-tender.  Spontaneously ranges toes and ankles, bilaterally.   No grimacing with passive ankle DF and PF, bilaterally.  DP pulse palpable.    Labs/Imaging:  Recent Labs   Lab Test 02/07/24  0901   WBC 10.4   HGB 12.8        A/P    1. S/p right intertrochanteric femur fracture ORIF using CM device (DOS: 2/8/24)  -Continue ASA 81 mg BID x 6 weeks for DVT prophylaxis.    -Mobilize with PT/OT.  -WBAT RLE with walker.    -Continue current pain regimen.  -Dressings: Keep current dressing in place. Order placed for RN to convert to Aquacel dressing on POD#2. Okay for RN to change dressings if peeling or saturated >60%.  -Follow-up: 2 weeks post-op with Dr. Josiah Dick    2. Disposition  -Anticipate d/c likely back to her memory care facility when medically cleared and progressing in PT.    Deena Malik PA-C  [facility]

## 2024-02-09 NOTE — PLAN OF CARE
"  Problem: Adult Inpatient Plan of Care  Goal: Plan of Care Review  Description: The Plan of Care Review/Shift note should be completed every shift.  The Outcome Evaluation is a brief statement about your assessment that the patient is improving, declining, or no change.  This information will be displayed automatically on your shift  note.  Outcome: Progressing  Goal: Patient-Specific Goal (Individualized)  Description: You can add care plan individualizations to a care plan. Examples of Individualization might be:  \"Parent requests to be called daily at 9am for status\", \"I have a hard time hearing out of my right ear\", or \"Do not touch me to wake me up as it startles  me\".  Outcome: Progressing  Goal: Absence of Hospital-Acquired Illness or Injury  Outcome: Progressing  Intervention: Identify and Manage Fall Risk  Recent Flowsheet Documentation  Taken 2/9/2024 0400 by Spike Johnson RN  Safety Promotion/Fall Prevention: safety round/check completed  Taken 2/9/2024 0000 by Spike Johnson RN  Safety Promotion/Fall Prevention: safety round/check completed  Taken 2/8/2024 2000 by Spike Johnson RN  Safety Promotion/Fall Prevention: safety round/check completed  Intervention: Prevent Skin Injury  Recent Flowsheet Documentation  Taken 2/9/2024 0000 by Spike Johnson RN  Body Position:   turned   right   left   weight shifting  Taken 2/8/2024 2000 by Spike Johnson RN  Body Position:   heels elevated   supine, head elevated  Intervention: Prevent and Manage VTE (Venous Thromboembolism) Risk  Recent Flowsheet Documentation  Taken 2/9/2024 0400 by Spike Johnson RN  VTE Prevention/Management: SCDs (sequential compression devices) on  Taken 2/9/2024 0000 by Spike Johnson RN  VTE Prevention/Management: SCDs (sequential compression devices) on  Taken 2/8/2024 2000 by Spike Johnson RN  VTE Prevention/Management: SCDs (sequential compression devices) on  Goal: Optimal " Comfort and Wellbeing  Outcome: Progressing  Goal: Readiness for Transition of Care  Outcome: Progressing     Problem: Risk for Delirium  Goal: Optimal Coping  Outcome: Progressing  Goal: Improved Behavioral Control  Outcome: Progressing  Intervention: Minimize Safety Risk  Recent Flowsheet Documentation  Taken 2/9/2024 0400 by Spike Johnson RN  Enhanced Safety Measures: pain management  Taken 2/9/2024 0000 by Spike Johnson RN  Communication Enhancement Strategies: one-step directions provided  Enhanced Safety Measures: pain management  Taken 2/8/2024 2000 by Spike Johnson RN  Communication Enhancement Strategies: one-step directions provided  Goal: Improved Attention and Thought Clarity  Outcome: Progressing  Intervention: Maximize Cognitive Function  Recent Flowsheet Documentation  Taken 2/9/2024 0000 by Spike Johnson RN  Sensory Stimulation Regulation:   care clustered   quiet environment promoted   lighting decreased  Reorientation Measures: reorientation provided  Taken 2/8/2024 2000 by Spike Johnson RN  Sensory Stimulation Regulation:   care clustered   quiet environment promoted   lighting decreased  Reorientation Measures:   familiar social contact encouraged   reorientation provided  Goal: Improved Sleep  Outcome: Progressing     Problem: Pain Acute  Goal: Optimal Pain Control and Function  Outcome: Progressing  Intervention: Prevent or Manage Pain  Recent Flowsheet Documentation  Taken 2/9/2024 0400 by Spike Johnson RN  Medication Review/Management: medications reviewed  Taken 2/9/2024 0000 by Spike Johnson RN  Sensory Stimulation Regulation:   care clustered   quiet environment promoted   lighting decreased  Medication Review/Management: medications reviewed  Taken 2/8/2024 2000 by Spike Johnson RN  Sensory Stimulation Regulation:   care clustered   quiet environment promoted   lighting decreased  Medication Review/Management: medications  reviewed     Problem: Orthopaedic Fracture  Goal: Absence of Bleeding  Outcome: Progressing  Goal: Bowel Elimination  Outcome: Progressing  Goal: Absence of Embolism Signs and Symptoms  Outcome: Progressing  Intervention: Prevent or Manage Embolism Risk  Recent Flowsheet Documentation  Taken 2/9/2024 0400 by Spike Johnson RN  VTE Prevention/Management: SCDs (sequential compression devices) on  Taken 2/9/2024 0000 by Spike Johnson RN  VTE Prevention/Management: SCDs (sequential compression devices) on  Taken 2/8/2024 2000 by Spike Johnson RN  VTE Prevention/Management: SCDs (sequential compression devices) on  Goal: Fracture Stability  Outcome: Progressing  Goal: Optimal Functional Ability  Outcome: Progressing  Intervention: Optimize Functional Ability  Recent Flowsheet Documentation  Taken 2/9/2024 0000 by Spike Johnson RN  Activity Management: activity adjusted per tolerance  Positioning/Transfer Devices:   pillows   in use  Taken 2/8/2024 2000 by Spike Johnson RN  Activity Management: activity adjusted per tolerance  Positioning/Transfer Devices:   pillows   in use  Goal: Absence of Infection Signs and Symptoms  Outcome: Progressing  Goal: Effective Tissue Perfusion  Outcome: Progressing  Goal: Optimal Pain Control and Function  Outcome: Progressing  Goal: Effective Oxygenation and Ventilation  Outcome: Progressing  Intervention: Promote Airway Secretion Clearance  Recent Flowsheet Documentation  Taken 2/9/2024 0000 by Spike Johnson RN  Activity Management: activity adjusted per tolerance  Taken 2/8/2024 2000 by Spike Johnson RN  Activity Management: activity adjusted per tolerance  Intervention: Optimize Oxygenation and Ventilation  Recent Flowsheet Documentation  Taken 2/9/2024 0000 by Spike Johnson RN  Head of Bed (HOB) Positioning: HOB at 20-30 degrees  Taken 2/8/2024 2000 by Spike Johnson RN  Head of Bed (HOB) Positioning: HOB at 30-45  degrees   Goal Outcome Evaluation:         Date/Time 2/9/24 @ [id number]    Trauma/Ortho/Medical (Choose one) Ortho    Diagnosis: ORIF R hip fracture with intramedullary nail.  POD#: 1  Mental Status: Alert to self  Activity/dangle Not OOB  Diet: Regular  Pain: No complaints of pain the entire shift.  Du/Voiding: Du catheter  Tele/Restraints/Iso: N/A  02/LDA: On oxygen at 5 LPM via oxy mask. PIV SL on the R forearm.  D/C Date: TBD.  Other Info: Dressing CDI. Latest oxygen sat of 89-93% at 5 LPM via oxy mask. On K, Mg, Phosphorus protocol.

## 2024-02-09 NOTE — CONSULTS
Care Management Initial Consult    General Information  Assessment completed with: Patient, Children, daughter Dayana  Type of CM/SW Visit: CM Role Introduction    Primary Care Provider verified and updated as needed: Yes   Readmission within the last 30 days:        Reason for Consult: discharge planning  Advance Care Planning:            Communication Assessment  Patient's communication style: spoken language (English or Bilingual)             Cognitive  Cognitive/Neuro/Behavioral: .WDL except  Level of Consciousness: somnolent, confused  Arousal Level: arouses to voice  Orientation: disoriented to, place, time, situation  Mood/Behavior: calm     Speech: clear    Living Environment:   People in home: alone, facility resident     Current living Arrangements: extended care facility  Name of Facility: [facility]   Able to return to prior arrangements: yes       Family/Social Support:  Care provided by: child(silvestre), homecare agency  Provides care for: no one, unable/limited ability to care for self  Marital Status:   Children          Description of Support System: Supportive, Involved    Support Assessment: Adequate family and caregiver support, Adequate social supports    Current Resources:   Patient receiving home care services:       Community Resources:    Equipment currently used at home: none  Supplies currently used at home:      Employment/Financial:  Employment Status: retired        Financial Concerns:             Does the patient's insurance plan have a 3 day qualifying hospital stay waiver?  No    Lifestyle & Psychosocial Needs:  Social Determinants of Health     Food Insecurity: Not on file   Depression: Not on file   Housing Stability: Not on file   Tobacco Use: Medium Risk (2/8/2024)    Patient History     Smoking Tobacco Use: Former     Smokeless Tobacco Use: Never     Passive Exposure: Not on file   Financial Resource Strain: Not on file   Alcohol Use: Not on file   Transportation  Needs: Not on file   Physical Activity: Not on file   Interpersonal Safety: Not on file   Stress: Not on file   Social Connections: Not on file       Functional Status:  Prior to admission patient needed assistance:              Mental Health Status:          Chemical Dependency Status:                Values/Beliefs:  Spiritual, Cultural Beliefs, Orthodox Practices, Values that affect care:                 Additional Information:  Consult for discharge planning. Patient admitted for fall with right closed hip fracture on 2/7/24 with tentative discharge date of 2/10/24. Writer reviewed chart and TCU recommendations at discharge.     Writer met with patient's daughter Dayana and patient to complete consult at bedside and introduced self and role. Dayana is in agreement for TCU at discharge and would like referrals sent to [facility]. Dayana said that she spoke to Odalis, director at [facility], who said that she recommends [facility]. She also noted that patient can return in a wheelchair. The family is interested in having patient discharge as soon as she's able so she can get home that much faster into an environment that is familiar.  At baseline patient is independent with her mobility. She is able to get dressed with prompts.     Transportation not discussed.    SALAZAR Garcia

## 2024-02-10 ENCOUNTER — APPOINTMENT (OUTPATIENT)
Dept: PHYSICAL THERAPY | Facility: CLINIC | Age: 74
DRG: 481 | End: 2024-02-10
Payer: MEDICARE

## 2024-02-10 LAB
GLUCOSE BLDC GLUCOMTR-MCNC: 93 MG/DL (ref 70–99)
GLUCOSE SERPL-MCNC: 88 MG/DL (ref 70–99)
HGB BLD-MCNC: 9.3 G/DL (ref 11.7–15.7)
MAGNESIUM SERPL-MCNC: 2 MG/DL (ref 1.7–2.3)
PHOSPHATE SERPL-MCNC: 1.8 MG/DL (ref 2.5–4.5)
POTASSIUM SERPL-SCNC: 3.5 MMOL/L (ref 3.4–5.3)

## 2024-02-10 PROCEDURE — 250N000013 HC RX MED GY IP 250 OP 250 PS 637: Performed by: STUDENT IN AN ORGANIZED HEALTH CARE EDUCATION/TRAINING PROGRAM

## 2024-02-10 PROCEDURE — 99232 SBSQ HOSP IP/OBS MODERATE 35: CPT | Performed by: INTERNAL MEDICINE

## 2024-02-10 PROCEDURE — 97116 GAIT TRAINING THERAPY: CPT | Mod: GP

## 2024-02-10 PROCEDURE — 250N000013 HC RX MED GY IP 250 OP 250 PS 637: Performed by: INTERNAL MEDICINE

## 2024-02-10 PROCEDURE — 36415 COLL VENOUS BLD VENIPUNCTURE: CPT | Performed by: STUDENT IN AN ORGANIZED HEALTH CARE EDUCATION/TRAINING PROGRAM

## 2024-02-10 PROCEDURE — 83735 ASSAY OF MAGNESIUM: CPT | Performed by: INTERNAL MEDICINE

## 2024-02-10 PROCEDURE — 82947 ASSAY GLUCOSE BLOOD QUANT: CPT | Performed by: STUDENT IN AN ORGANIZED HEALTH CARE EDUCATION/TRAINING PROGRAM

## 2024-02-10 PROCEDURE — 97530 THERAPEUTIC ACTIVITIES: CPT | Mod: GP

## 2024-02-10 PROCEDURE — 85018 HEMOGLOBIN: CPT | Performed by: STUDENT IN AN ORGANIZED HEALTH CARE EDUCATION/TRAINING PROGRAM

## 2024-02-10 PROCEDURE — 120N000001 HC R&B MED SURG/OB

## 2024-02-10 PROCEDURE — 84132 ASSAY OF SERUM POTASSIUM: CPT | Performed by: INTERNAL MEDICINE

## 2024-02-10 PROCEDURE — 84100 ASSAY OF PHOSPHORUS: CPT | Performed by: INTERNAL MEDICINE

## 2024-02-10 RX ADMIN — DIVALPROEX SODIUM 125 MG: 125 TABLET, DELAYED RELEASE ORAL at 12:33

## 2024-02-10 RX ADMIN — ASPIRIN 81 MG: 81 TABLET, COATED ORAL at 08:57

## 2024-02-10 RX ADMIN — Medication 50 MCG: at 08:57

## 2024-02-10 RX ADMIN — ACETAMINOPHEN 650 MG: 325 TABLET, FILM COATED ORAL at 20:18

## 2024-02-10 RX ADMIN — MEMANTINE 10 MG: 10 TABLET ORAL at 08:58

## 2024-02-10 RX ADMIN — QUETIAPINE FUMARATE 50 MG: 50 TABLET ORAL at 08:58

## 2024-02-10 RX ADMIN — ACETAMINOPHEN 650 MG: 325 TABLET, FILM COATED ORAL at 13:49

## 2024-02-10 RX ADMIN — ASPIRIN 81 MG: 81 TABLET, COATED ORAL at 20:18

## 2024-02-10 RX ADMIN — POTASSIUM & SODIUM PHOSPHATES POWDER PACK 280-160-250 MG 2 PACKET: 280-160-250 PACK at 20:18

## 2024-02-10 RX ADMIN — MEMANTINE 10 MG: 10 TABLET ORAL at 20:18

## 2024-02-10 RX ADMIN — Medication 1 MG: at 20:18

## 2024-02-10 RX ADMIN — QUETIAPINE FUMARATE 50 MG: 50 TABLET ORAL at 20:18

## 2024-02-10 RX ADMIN — ESCITALOPRAM OXALATE 20 MG: 20 TABLET, FILM COATED ORAL at 08:58

## 2024-02-10 RX ADMIN — POLYETHYLENE GLYCOL 3350 17 G: 17 POWDER, FOR SOLUTION ORAL at 16:00

## 2024-02-10 RX ADMIN — POTASSIUM & SODIUM PHOSPHATES POWDER PACK 280-160-250 MG 2 PACKET: 280-160-250 PACK at 16:00

## 2024-02-10 RX ADMIN — SENNOSIDES AND DOCUSATE SODIUM 1 TABLET: 50; 8.6 TABLET ORAL at 20:17

## 2024-02-10 RX ADMIN — SENNOSIDES AND DOCUSATE SODIUM 1 TABLET: 50; 8.6 TABLET ORAL at 08:57

## 2024-02-10 RX ADMIN — DONEPEZIL HYDROCHLORIDE 5 MG: 5 TABLET, FILM COATED ORAL at 08:57

## 2024-02-10 RX ADMIN — DIVALPROEX SODIUM 125 MG: 125 TABLET, DELAYED RELEASE ORAL at 08:57

## 2024-02-10 ASSESSMENT — ACTIVITIES OF DAILY LIVING (ADL)
ADLS_ACUITY_SCORE: 50
ADLS_ACUITY_SCORE: 42
ADLS_ACUITY_SCORE: 42
ADLS_ACUITY_SCORE: 50
ADLS_ACUITY_SCORE: 42
ADLS_ACUITY_SCORE: 42
ADLS_ACUITY_SCORE: 46
ADLS_ACUITY_SCORE: 50
ADLS_ACUITY_SCORE: 50
ADLS_ACUITY_SCORE: 42
ADLS_ACUITY_SCORE: 42
ADLS_ACUITY_SCORE: 50

## 2024-02-10 NOTE — PLAN OF CARE
Goal Outcome Evaluation:      Date/Time: 1900-7:30    Trauma/Ortho    Diagnosis: OPEN REDUCTION INTERNAL FIXATION RIGHT HIP FRACTURE WITH INTRAMEDULLARY NAIL.    POD#: 2  Mental Status: Alert to self  Activity/dangle: Assist of 2 with GW  Diet: Regular  Pain: Tylenol  Du/Voiding: Du  Tele/Restraints/Iso: NA  02/LDA: 1liter  D/C Date: TBD   Other Info: Hx of dementia, Du wipe, secure and in place for retention, and draining well. PIV-SL, dressing re-enforced, CDI, saturation was 88-89 while on RA, put on 1 liter 02, saturation is now 91-92. Weight shift done, was pulling tubing and dressing at the onset of shift, close monitoring continue.

## 2024-02-10 NOTE — PLAN OF CARE
Alert to self.  Family at bedside today.  Tolerated regular diet.  Adequate urine output via arizmendi catheter.  Turned and repositioned.  When family is not in room the patient is restless and pulls at arizmendi and dressing.  Oxygen 90% on room air.  Patient stood at edge of bed with physical therapy today.

## 2024-02-10 NOTE — PLAN OF CARE
Diagnosis: Right hip Nailing  POD#: 2  Mental Status: A& o X 1 - knows name  Activity/dangle up with 2 SS  Diet: regular - set up  Pain: tylenol  Du/Voiding: DTV - BS 88  Tele/Restraints/Iso: NA  02/LDA: ARACELIS  D/C Date: ? Pending TCU  Other Info: Aquacel to right hip

## 2024-02-10 NOTE — PROGRESS NOTES
[facility]    Medicine Progress Note - Hospitalist Service    Date of Admission:  2/7/2024    Assessment & Plan   Mildred Connelly is a 73 year old female admitted on 2/7/2024. Her past medical history is significant for only Alzheimer's Disease, for which she is followed at the [facility]. Presented by ambulance from her Memory Care Facility after being found down for an unknown period of time. Imaging revealed a right hip fracture.      Right hip intertrochanteric fracture s/p ORIF 2/8/24  Unwitnessed fall, suspected mechanical  Acute blood loss anemia secondary to fracture/surgery:     - Appears to be doing well post-op.  Post-op site cares, activity restrictions, pain medications, DVT proph per orthopedics.  Modest hgb decline with surgery, no indication for a current transfusion.  -  Appears will need TCU/rehab, memory care likely cannot accommodate her with her short term mobility challenges.    Encourage more activity/incentive spirometry use.  Currently on room air    Alzheimer's Disease:  Per daughter, patient's baseline mental status is alert to self only. She is at her baseline. Follows at [facility].  She answers basic questions well.  - Continue PTA seroquel, aricept, namenda, VPA    Hyperlipidemia   - Stable         Disposition - medically ready for discharge.  SW assisting with TCU discharge    Medical Decision Making       49 MINUTES SPENT BY ME on the date of service doing chart review, history, exam, documentation & further activities per the note.        PPE Worn:  Mask, gloves     Diet: Advance Diet as Tolerated: Regular Diet Adult    DVT Prophylaxis: ASA 81 mg po bid as per ortho  Du Catheter: PRESENT, indication: Retention  Lines: None     Cardiac Monitoring: None  Code Status: Full Code      Clinically Significant Risk Factors                      # Dementia: noted on problem list    # Overweight: Estimated body mass index is 25.06 kg/m  as calculated from the  "following:    Height as of this encounter: 1.702 m (5' 7\").    Weight as of this encounter: 72.6 kg (160 lb)., PRESENT ON ADMISSION            Disposition Plan     Expected Discharge Date: 02/12/2024                  Judith Amor DO  Hospitalist Service  [facility]  Securely message with [employer] (more info)  Text page via [facility] Paging/Directory   ______________________________________________________________________    Interval History     \"I am ok\".   Appears comfortable at rest; breakfast tray in front of her and most of her food appears to be uneaten.    Was placed on supplemental oxygen overnight for oxygen sats 88-89% - currently off oxygen      Physical Exam   Vital Signs: Temp: 98.8  F (37.1  C) Temp src: Axillary BP: 121/61 Pulse: 70   Resp: 18 SpO2: 94 % O2 Device: Nasal cannula Oxygen Delivery: 2 LPM  Weight: 160 lbs 0 oz    GEN:  Alert, awake.  Currently trying to use the receipt from her lunch tray as a kleenex.   HEENT:  Normocephalic/atraumatic, no scleral icterus, no nasal discharge, mouth moist.  CV:  somewhat distant but no loud murmur.  S1 + S2 noted, no S3 or S4.  LUNGS:  Clear to auscultation ant/lat bilaterally without rales/rhonchi/wheezing/retractions.  Symmetric chest rise on inhalation noted.  ABD:  Active bowel sounds, soft, non-tender/non-distended.  No rebound/guarding/rigidity.  EXT:  right lateral hip with dry gauze dressing in place; expected post-op edema noted.    SKIN:  Dry to touch, no exanthems noted in the visualized areas.  NEURO:  CN 2-12 intact to gross testing bilaterally; able to wiggle toes on both legs bilaterally.    Medications    lactated ringers Stopped (02/09/24 3544)      aspirin  81 mg Oral BID    divalproex sodium delayed-release  125 mg Oral BID    donepezil  5 mg Oral QAM    escitalopram  20 mg Oral Daily    memantine  10 mg Oral BID    polyethylene glycol  17 g Oral Daily    QUEtiapine  50 mg Oral BID    senna-docusate  1 " tablet Oral BID    sodium chloride (PF)  3 mL Intracatheter Q8H    cholecalciferol  50 mcg Oral Daily       Data     Labs and Imaging results below reviewed today.  Recent Labs   Lab 02/10/24  0743 02/09/24  0801 02/07/24  0901   WBC  --   --  10.4   HGB 9.3* 10.4* 12.8   HCT  --   --  39.6   MCV  --   --  98   PLT  --   --  186     Recent Labs   Lab 02/10/24  0743 02/10/24  0541 02/09/24  2207 02/09/24  0801 02/08/24  0628 02/07/24  0901   NA  --   --   --  142  --  143   POTASSIUM 3.5  --   --  3.9 4.1 3.8   CHLORIDE  --   --   --  104  --  104   CO2  --   --   --  28  --  30*   ANIONGAP  --   --   --  10  --  9   GLC 88 93 122* 115*  --  123*   BUN  --   --   --  16.3  --  22.4   CR  --   --   --  0.60  --  0.77   GFRESTIMATED  --   --   --  >90  --  82   EV  --   --   --  8.6*  --  8.9     Recent Labs   Lab 02/10/24  0743 02/10/24  0541 02/09/24  2207 02/09/24  0801 02/08/24  0628 02/07/24  1712 02/07/24 0901   NA  --   --   --  142  --   --  143   POTASSIUM 3.5  --   --  3.9 4.1  --  3.8   CHLORIDE  --   --   --  104  --   --  104   CO2  --   --   --  28  --   --  30*   ANIONGAP  --   --   --  10  --   --  9   GLC 88 93 122* 115*  --   --  123*   BUN  --   --   --  16.3  --   --  22.4   CR  --   --   --  0.60  --   --  0.77   GFRESTIMATED  --   --   --  >90  --   --  82   EV  --   --   --  8.6*  --   --  8.9   MAG 2.0  --   --  2.2 2.1   < >  --    PHOS 1.8*  --   --  2.5 3.2   < >  --     < > = values in this interval not displayed.       No results found for this or any previous visit (from the past 24 hour(s)).

## 2024-02-10 NOTE — PROGRESS NOTES
R ORIF w/Nailing Postop Day #2  Patient vital signs are at baseline: Yes, A/Ox1  Patient able to ambulate as they were prior to admission or with assist devices provided by therapies during their stay:  No,  Reason: pt improving. Ambulated w/PT using 2A w/FWW & gait belt.  Patient MUST void prior to discharge:  No,  Reason:  Du catheter removed @1100am. Bladder scan @1400 88ml; repeat bladder scan @1800.  Patient able to tolerate oral intake:  Yes  Pain has adequate pain control using Oral analgesics:  Yes, improving. Tylenol for pain.  RN Managed K, Mg, & Phos(replaced; recheck am)  Does patient have an identified :  Yes,   Has goal D/C date and time been discussed with patient:  No,  Reason:  Pending TCU

## 2024-02-10 NOTE — PROGRESS NOTES
Orthopedic Surgery  Mildred Connelly  02/10/2024     Admit Date:  2/7/2024    POD: 2 Days Post-Op   Procedure(s):  OPEN REDUCTION INTERNAL FIXATION RIGHT HIP FRACTURE WITH INTRAMEDULLARY NAIL.    Baseline advanced Alzheimer's dementia. Awake and alert to self.   Pain controlled.  Tolerating oral intake.    No acute events overnight.    Temp:  [98.8  F (37.1  C)-99.9  F (37.7  C)] 98.8  F (37.1  C)  Pulse:  [70-86] 73  Resp:  [15-18] 16  BP: (103-121)/(55-86) 103/55  SpO2:  [90 %-94 %] 92 %    No acute distress   Right hip bulky dressing is clean, dry, and intact.   Minimal erythema of the surrounding skin.   Mild swelling to the right lateral hip and thigh.   Thigh remains soft and compressible.  Bilateral calves are soft, seemingly non-tender.  Spontaneously ranges toes and ankles, bilaterally.   DP pulse palpable.    Labs/Imaging:  Recent Labs   Lab Test 02/10/24  0743 02/09/24  0801 02/07/24  0901   WBC  --   --  10.4   HGB 9.3* 10.4* 12.8   PLT  --   --  186     A/P    1. S/p right intertrochanteric femur fracture ORIF using CM device (DOS: 2/8/24)  -Continue ASA 81 mg BID x 6 weeks for DVT prophylaxis.    -Mobilize with PT/OT.  -WBAT RLE with walker.    -Continue current pain regimen.  -Dressings: Keep current dressing in place. Order placed for RN to convert to Aquacel dressing on POD#2. Okay for RN to change dressings if peeling or saturated >60%.  -Follow-up: 2 weeks post-op with Dr. Josiah Dick    2. Disposition  -Anticipate d/c to TCU when medically cleared and progressing in PT.      [facility]

## 2024-02-11 LAB
ALBUMIN UR-MCNC: 20 MG/DL
APPEARANCE UR: CLEAR
BILIRUB UR QL STRIP: NEGATIVE
COLOR UR AUTO: YELLOW
GLUCOSE UR STRIP-MCNC: NEGATIVE MG/DL
HGB UR QL STRIP: NEGATIVE
KETONES UR STRIP-MCNC: NEGATIVE MG/DL
LEUKOCYTE ESTERASE UR QL STRIP: ABNORMAL
MAGNESIUM SERPL-MCNC: 2.1 MG/DL (ref 1.7–2.3)
MUCOUS THREADS #/AREA URNS LPF: PRESENT /LPF
NITRATE UR QL: NEGATIVE
PH UR STRIP: 7 [PH] (ref 5–7)
PHOSPHATE SERPL-MCNC: 3.9 MG/DL (ref 2.5–4.5)
POTASSIUM SERPL-SCNC: 3.6 MMOL/L (ref 3.4–5.3)
RBC URINE: 2 /HPF
SP GR UR STRIP: 1.03 (ref 1–1.03)
SQUAMOUS EPITHELIAL: <1 /HPF
UROBILINOGEN UR STRIP-MCNC: NORMAL MG/DL
WBC URINE: 6 /HPF

## 2024-02-11 PROCEDURE — 250N000013 HC RX MED GY IP 250 OP 250 PS 637: Performed by: INTERNAL MEDICINE

## 2024-02-11 PROCEDURE — 120N000001 HC R&B MED SURG/OB

## 2024-02-11 PROCEDURE — 84132 ASSAY OF SERUM POTASSIUM: CPT | Performed by: INTERNAL MEDICINE

## 2024-02-11 PROCEDURE — 83735 ASSAY OF MAGNESIUM: CPT | Performed by: INTERNAL MEDICINE

## 2024-02-11 PROCEDURE — 84100 ASSAY OF PHOSPHORUS: CPT | Performed by: INTERNAL MEDICINE

## 2024-02-11 PROCEDURE — 250N000013 HC RX MED GY IP 250 OP 250 PS 637: Performed by: STUDENT IN AN ORGANIZED HEALTH CARE EDUCATION/TRAINING PROGRAM

## 2024-02-11 PROCEDURE — 36415 COLL VENOUS BLD VENIPUNCTURE: CPT | Performed by: INTERNAL MEDICINE

## 2024-02-11 PROCEDURE — 81001 URINALYSIS AUTO W/SCOPE: CPT | Performed by: INTERNAL MEDICINE

## 2024-02-11 PROCEDURE — 99233 SBSQ HOSP IP/OBS HIGH 50: CPT | Performed by: INTERNAL MEDICINE

## 2024-02-11 PROCEDURE — 87086 URINE CULTURE/COLONY COUNT: CPT | Performed by: INTERNAL MEDICINE

## 2024-02-11 RX ORDER — HYDROXYZINE HYDROCHLORIDE 10 MG/1
10 TABLET, FILM COATED ORAL EVERY 4 HOURS PRN
Status: DISCONTINUED | OUTPATIENT
Start: 2024-02-11 | End: 2024-02-14 | Stop reason: HOSPADM

## 2024-02-11 RX ADMIN — QUETIAPINE FUMARATE 50 MG: 50 TABLET ORAL at 20:24

## 2024-02-11 RX ADMIN — Medication 50 MCG: at 08:40

## 2024-02-11 RX ADMIN — DONEPEZIL HYDROCHLORIDE 5 MG: 5 TABLET, FILM COATED ORAL at 08:40

## 2024-02-11 RX ADMIN — ASPIRIN 81 MG: 81 TABLET, COATED ORAL at 08:40

## 2024-02-11 RX ADMIN — ACETAMINOPHEN 650 MG: 325 TABLET, FILM COATED ORAL at 08:40

## 2024-02-11 RX ADMIN — HYDROXYZINE HYDROCHLORIDE 10 MG: 10 TABLET ORAL at 12:19

## 2024-02-11 RX ADMIN — POLYETHYLENE GLYCOL 3350 17 G: 17 POWDER, FOR SOLUTION ORAL at 08:42

## 2024-02-11 RX ADMIN — ESCITALOPRAM OXALATE 20 MG: 20 TABLET, FILM COATED ORAL at 08:40

## 2024-02-11 RX ADMIN — DIVALPROEX SODIUM 125 MG: 125 TABLET, DELAYED RELEASE ORAL at 12:19

## 2024-02-11 RX ADMIN — MEMANTINE 10 MG: 10 TABLET ORAL at 08:40

## 2024-02-11 RX ADMIN — POTASSIUM & SODIUM PHOSPHATES POWDER PACK 280-160-250 MG 2 PACKET: 280-160-250 PACK at 00:02

## 2024-02-11 RX ADMIN — SENNOSIDES AND DOCUSATE SODIUM 1 TABLET: 50; 8.6 TABLET ORAL at 20:24

## 2024-02-11 RX ADMIN — QUETIAPINE FUMARATE 50 MG: 50 TABLET ORAL at 08:40

## 2024-02-11 RX ADMIN — ASPIRIN 81 MG: 81 TABLET, COATED ORAL at 20:25

## 2024-02-11 RX ADMIN — SENNOSIDES AND DOCUSATE SODIUM 1 TABLET: 50; 8.6 TABLET ORAL at 08:45

## 2024-02-11 RX ADMIN — DIVALPROEX SODIUM 125 MG: 125 TABLET, DELAYED RELEASE ORAL at 08:40

## 2024-02-11 RX ADMIN — MEMANTINE 10 MG: 10 TABLET ORAL at 20:24

## 2024-02-11 ASSESSMENT — ACTIVITIES OF DAILY LIVING (ADL)
ADLS_ACUITY_SCORE: 50

## 2024-02-11 NOTE — PLAN OF CARE
Goal Outcome Evaluation:          Date/Time: 1900-7:30     Trauma/Ortho     Diagnosis: OPEN REDUCTION INTERNAL FIXATION RIGHT HIP FRACTURE WITH INTRAMEDULLARY NAIL.     POD#: 3  Mental Status: Alert to self  Activity/dangle: Assist of 1 with eren steady  Diet: Regular  Pain: Tylenol  Du/Voiding:BR  Tele/Restraints/Iso: NA  02/LDA: 1liter  D/C Date: TBD   Other Info: Passed a quiet shift,  PIV-SL, dressing CDI, 02 running at 1 liter, melatonin given, weight shift done, on potassium-phosphorus protocol, please look out for morning lab. Constipated, BM x1, Bladder scan 600, straight cath 700.  Monitoring continue.

## 2024-02-11 NOTE — PROGRESS NOTES
Orthopedic Surgery  Mildred Connelly  02/11/2024     Admit Date:  2/7/2024    POD: 3 Days Post-Op   Procedure(s):  OPEN REDUCTION INTERNAL FIXATION RIGHT HIP FRACTURE WITH INTRAMEDULLARY NAIL.    Baseline advanced Alzheimer's dementia. Awake and alert to self.   Pain controlled. Son is at the bedside today and notes that she has increased pain with movement. We discuss adding an adjunct pain med in for better pain control.   Tolerating oral intake.    No acute events overnight.  Pending dispo recs. Memory care was under assumption of dispo to TCU, Family prefers return to memory care and TCU referrals were denied. Social work currently in discussion with TCU    Temp:  [97.5  F (36.4  C)-101.2  F (38.4  C)] 98.5  F (36.9  C)  Pulse:  [71-91] 91  Resp:  [16] 16  BP: ()/(50-61) 98/61  SpO2:  [91 %-94 %] 91 %    No acute distress   Right hip bulky dressing is clean, dry, and intact.   Minimal erythema of the surrounding skin.   Mild swelling to the right lateral hip and thigh.   Thigh remains soft and compressible.  Bilateral calves are soft, seemingly non-tender.  Spontaneously ranges toes and ankles, bilaterally.   DP pulse palpable.    Labs/Imaging:  Recent Labs   Lab Test 02/10/24  0743 02/09/24  0801 02/07/24  0901   WBC  --   --  10.4   HGB 9.3* 10.4* 12.8   PLT  --   --  186     A/P    1. S/p right intertrochanteric femur fracture ORIF using CM device (DOS: 2/8/24)  -Continue ASA 81 mg BID x 6 weeks for DVT prophylaxis.    -Mobilize with PT/OT.  -WBAT RLE with walker.    -Continue current pain regimen. Adding 10 mg Atarax q 4 hours as pain adjunct.   -Dressings: Keep current dressing in place. Okay for RN to change dressings if peeling or saturated >60%.  -Follow-up: 2 weeks post-op with Dr. Josiah Dick    2. Disposition  -Anticipate d/c to [facility] vs TCU when medically cleared and progressing in PT. Pending conversation between social/CM and  at this time.       [facility]

## 2024-02-11 NOTE — PROGRESS NOTES
[facility]    Medicine Progress Note - Hospitalist Service    Date of Admission:  2/7/2024    Assessment & Plan   Mildred Connelly is a 73 year old female admitted on 2/7/2024. Her past medical history is significant for only Alzheimer's Disease, for which she is followed at the [facility]. Presented by ambulance from her Memory Care Facility after being found down for an unknown period of time. Imaging revealed a right hip fracture.      Right hip intertrochanteric fracture s/p ORIF 2/8/24  Unwitnessed fall, suspected mechanical  Acute blood loss anemia secondary to fracture/surgery:     - Appears to be doing well post-op.  Post-op site cares, activity restrictions, pain medications, DVT proph per orthopedics.  Modest hgb decline with surgery, no indication for a current transfusion.  -  Appears will need TCU/rehab, memory care likely cannot accommodate her with her short term mobility challenges.    Fevers  hypotension    Noted to have fever last evening recorded at 101.2  Was given tylenol with improvement  This am, she is slightly hypotensive  Will repeat BP now and if still hypotensive will give small IVF bolus  Will get a UA/UC now especially in light of urinary retention    Continue with IS and up out of bed    If fevers continue will evaluate further with blood cultures, consider CXR,  etc.    Urinary retention -  post op    Du removed as per post-op instructions on 2/10/24  Straight cathed x  1 this am f or  700 ml  Continue to encourage up in chair and  up out of bed  Will encourage po intake now that IVF have been stopped (she doesn't remember to eat/drink) and so will order assisted feedings    Alzheimer's Disease:  Per daughter, patient's baseline mental status is alert to self only. She is at her baseline. Follows at [facility].  She answers basic questions well.    This chronic disease condition does place her at higher overall risk for post-op complications    - Continue  "PTA seroquel, aricept, namenda, VPA      Hyperlipidemia   - Stable     Medical Decision Making       52 MINUTES SPENT BY ME on the date of service doing chart review, history, exam, documentation & further activities per the note.        PPE Worn:  Mask, gloves     Diet: Advance Diet as Tolerated: Regular Diet Adult    DVT Prophylaxis: ASA 81 mg po bid as per ortho  Du Catheter: Not present  Lines: None     Cardiac Monitoring: None  Code Status: Full Code      Clinically Significant Risk Factors                      # Dementia: noted on problem list    # Overweight: Estimated body mass index is 25.06 kg/m  as calculated from the following:    Height as of this encounter: 1.702 m (5' 7\").    Weight as of this encounter: 72.6 kg (160 lb)., PRESENT ON ADMISSION            Disposition Plan     Expected Discharge Date: 02/12/2024                  Judith Amor DO  Hospitalist Service  [facility]  Securely message with [employer] (more info)  Text page via [facility] Paging/Directory   ______________________________________________________________________    Interval History     Seen with son in room this am.         Currently is drinking a caribou mocha and stating \"it is good\".  Denies current HA, CP or SOB.  About to eat breakfast.    Did have a fever last night---it appears that she received tylenol for it.  No fevers recorded this am but BP is  softer    Physical Exam   Vital Signs: Temp: 97.5  F (36.4  C) Temp src: Oral BP: 108/57 Pulse: 71   Resp: 16 SpO2: 93 % O2 Device: None (Room air) Oxygen Delivery: 1 LPM  Weight: 160 lbs 0 oz    GEN:  Alert, awake and sitting up in bed  Appears to be in no acute distress  HEENT:  Normocephalic/atraumatic, no scleral icterus, no nasal discharge, mouth moist.  CV:  somewhat distant but no loud murmur.  S1 + S2 noted, no S3 or S4.  LUNGS:  Clear to auscultation ant/lat bilaterally. Symmetric chest rise on inhalation noted.  ABD:  Active bowel " sounds, soft, non-tender/non-distended.  No clear rebound/guarding/rigidity.  EXT:  right lateral hip with dry surgical dressing in place; slight post-op edema noted  around bandage  SKIN:  Dry to touch, no exanthems noted in the visualized areas.  NEURO:  CN 2-12 intact to gross testing bilaterally  Memory very poor and limited  Speech also somewhat limited but words are clear    Medications    lactated ringers Stopped (02/09/24 0437)      aspirin  81 mg Oral BID    divalproex sodium delayed-release  125 mg Oral BID    donepezil  5 mg Oral QAM    escitalopram  20 mg Oral Daily    memantine  10 mg Oral BID    polyethylene glycol  17 g Oral Daily    QUEtiapine  50 mg Oral BID    senna-docusate  1 tablet Oral BID    sodium chloride (PF)  3 mL Intracatheter Q8H    cholecalciferol  50 mcg Oral Daily       Data     Labs and Imaging results below reviewed today.  Recent Labs   Lab 02/10/24  0743 02/09/24  0801 02/07/24  0901   WBC  --   --  10.4   HGB 9.3* 10.4* 12.8   HCT  --   --  39.6   MCV  --   --  98   PLT  --   --  186     Recent Labs   Lab 02/11/24  0353 02/10/24  0743 02/10/24  0541 02/09/24  2207 02/09/24  0801 02/08/24  0628 02/07/24  0901   NA  --   --   --   --  142  --  143   POTASSIUM 3.6 3.5  --   --  3.9   < > 3.8   CHLORIDE  --   --   --   --  104  --  104   CO2  --   --   --   --  28  --  30*   ANIONGAP  --   --   --   --  10  --  9   GLC  --  88 93 122* 115*  --  123*   BUN  --   --   --   --  16.3  --  22.4   CR  --   --   --   --  0.60  --  0.77   GFRESTIMATED  --   --   --   --  >90  --  82   EV  --   --   --   --  8.6*  --  8.9    < > = values in this interval not displayed.     Recent Labs   Lab 02/11/24  0353 02/10/24  0743 02/10/24  0541 02/09/24  2207 02/09/24  0801 02/07/24  1712 02/07/24 0901   NA  --   --   --   --  142  --  143   POTASSIUM 3.6 3.5  --   --  3.9   < > 3.8   CHLORIDE  --   --   --   --  104  --  104   CO2  --   --   --   --  28  --  30*   ANIONGAP  --   --   --   --  10  --   9   GLC  --  88 93 122* 115*  --  123*   BUN  --   --   --   --  16.3  --  22.4   CR  --   --   --   --  0.60  --  0.77   GFRESTIMATED  --   --   --   --  >90  --  82   EV  --   --   --   --  8.6*  --  8.9   MAG 2.1 2.0  --   --  2.2   < >  --    PHOS 3.9 1.8*  --   --  2.5   < >  --     < > = values in this interval not displayed.       No results found for this or any previous visit (from the past 24 hour(s)).

## 2024-02-11 NOTE — PROGRESS NOTES
Pt Alert to self, baseline dementia. VSS on RA. Regular diet. Family at bedside. Up 2A w/ Sera steady. Up in chair for lunch. Voiding in BR. RLE dressing CDI, CMS intact. Denies pain at rest, PRN Tylenol and atarax given. R PIV SL. Discharge to TCU vs halfway TBD. Continue plan of care.

## 2024-02-11 NOTE — PROGRESS NOTES
Care Management Follow Up    Length of Stay (days): 4    Expected Discharge Date: 02/12/2024     Concerns to be Addressed:       Patient plan of care discussed at interdisciplinary rounds: Yes    Anticipated Discharge Disposition: Transitional Care, Skilled Nursing Facility     Anticipated Discharge Services: None  Anticipated Discharge DME: None    Patient/family educated on Medicare website which has current facility and service quality ratings: no  Education Provided on the Discharge Plan:    Patient/Family in Agreement with the Plan: yes    Referrals Placed by CM/SW: Post Acute Facilities  Private pay costs discussed: Not applicable    Additional Information:  Writer informed by bedside nurse that patient's family would prefer that she discharge back to [facility]. As of now, TCU referrals sent were declined. Call to facility and spoke to LOURDES Thomas. He stated that he will talk to his DON but that they assumed that patient would need TCU. Writer provided information from chart including WB status and asked if he would like information faxed to them. He agreed and information sent to [phone]. Writer to update family and wait to hear from facility.  Writer spoke to son and family in room to update. He noted that he prefers that patient return to memory care. Writer discussed other options for TCU including [facility], [facility] and [facility]. Writer asked if they'd like referrals sent just in case and right now they don't. Son notes that daughter Dayana has been in regular communication with [facility].    Call returned. William recommends that SW call [facility], director, tomorrow to discuss. Sounds like if they accept her back it wouldn't be until mid week. COREY plans to reach out tomorrow to discuss plan.    SALAZAR Garcia

## 2024-02-12 ENCOUNTER — APPOINTMENT (OUTPATIENT)
Dept: PHYSICAL THERAPY | Facility: CLINIC | Age: 74
DRG: 481 | End: 2024-02-12
Payer: MEDICARE

## 2024-02-12 LAB
ANION GAP SERPL CALCULATED.3IONS-SCNC: 10 MMOL/L (ref 7–15)
BACTERIA UR CULT: NORMAL
BUN SERPL-MCNC: 16.8 MG/DL (ref 8–23)
CALCIUM SERPL-MCNC: 8.8 MG/DL (ref 8.8–10.2)
CHLORIDE SERPL-SCNC: 103 MMOL/L (ref 98–107)
CREAT SERPL-MCNC: 0.64 MG/DL (ref 0.51–0.95)
DEPRECATED HCO3 PLAS-SCNC: 27 MMOL/L (ref 22–29)
EGFRCR SERPLBLD CKD-EPI 2021: >90 ML/MIN/1.73M2
ERYTHROCYTE [DISTWIDTH] IN BLOOD BY AUTOMATED COUNT: 13.1 % (ref 10–15)
GLUCOSE SERPL-MCNC: 94 MG/DL (ref 70–99)
HCT VFR BLD AUTO: 30.5 % (ref 35–47)
HGB BLD-MCNC: 9.9 G/DL (ref 11.7–15.7)
MAGNESIUM SERPL-MCNC: 2.1 MG/DL (ref 1.7–2.3)
MCH RBC QN AUTO: 31.6 PG (ref 26.5–33)
MCHC RBC AUTO-ENTMCNC: 32.5 G/DL (ref 31.5–36.5)
MCV RBC AUTO: 97 FL (ref 78–100)
PHOSPHATE SERPL-MCNC: 3.3 MG/DL (ref 2.5–4.5)
PLATELET # BLD AUTO: 208 10E3/UL (ref 150–450)
POTASSIUM SERPL-SCNC: 3.5 MMOL/L (ref 3.4–5.3)
RBC # BLD AUTO: 3.13 10E6/UL (ref 3.8–5.2)
SODIUM SERPL-SCNC: 140 MMOL/L (ref 135–145)
WBC # BLD AUTO: 9.2 10E3/UL (ref 4–11)

## 2024-02-12 PROCEDURE — 85027 COMPLETE CBC AUTOMATED: CPT | Performed by: INTERNAL MEDICINE

## 2024-02-12 PROCEDURE — 250N000013 HC RX MED GY IP 250 OP 250 PS 637: Performed by: STUDENT IN AN ORGANIZED HEALTH CARE EDUCATION/TRAINING PROGRAM

## 2024-02-12 PROCEDURE — 80048 BASIC METABOLIC PNL TOTAL CA: CPT | Performed by: INTERNAL MEDICINE

## 2024-02-12 PROCEDURE — 36415 COLL VENOUS BLD VENIPUNCTURE: CPT | Performed by: INTERNAL MEDICINE

## 2024-02-12 PROCEDURE — 97110 THERAPEUTIC EXERCISES: CPT | Mod: GP

## 2024-02-12 PROCEDURE — 250N000013 HC RX MED GY IP 250 OP 250 PS 637: Performed by: HOSPITALIST

## 2024-02-12 PROCEDURE — 83735 ASSAY OF MAGNESIUM: CPT | Performed by: INTERNAL MEDICINE

## 2024-02-12 PROCEDURE — 84100 ASSAY OF PHOSPHORUS: CPT | Performed by: INTERNAL MEDICINE

## 2024-02-12 PROCEDURE — 120N000001 HC R&B MED SURG/OB

## 2024-02-12 PROCEDURE — 97530 THERAPEUTIC ACTIVITIES: CPT | Mod: GP

## 2024-02-12 PROCEDURE — 99233 SBSQ HOSP IP/OBS HIGH 50: CPT | Performed by: HOSPITALIST

## 2024-02-12 PROCEDURE — 250N000013 HC RX MED GY IP 250 OP 250 PS 637: Performed by: INTERNAL MEDICINE

## 2024-02-12 RX ORDER — AMOXICILLIN 250 MG
1 CAPSULE ORAL 2 TIMES DAILY PRN
DISCHARGE
Start: 2024-02-12

## 2024-02-12 RX ORDER — OXYCODONE HYDROCHLORIDE 5 MG/1
2.5-5 TABLET ORAL EVERY 4 HOURS PRN
Qty: 20 TABLET | Refills: 0 | Status: SHIPPED | OUTPATIENT
Start: 2024-02-12 | End: 2024-03-13

## 2024-02-12 RX ORDER — HYDROXYZINE HYDROCHLORIDE 10 MG/1
10 TABLET, FILM COATED ORAL EVERY 4 HOURS PRN
DISCHARGE
Start: 2024-02-12 | End: 2024-03-13

## 2024-02-12 RX ORDER — VITAMIN B COMPLEX
50 TABLET ORAL DAILY
DISCHARGE
Start: 2024-02-13 | End: 2024-07-04

## 2024-02-12 RX ORDER — ASPIRIN 81 MG/1
81 TABLET ORAL 2 TIMES DAILY
DISCHARGE
Start: 2024-02-12 | End: 2024-03-13 | Stop reason: DRUGHIGH

## 2024-02-12 RX ORDER — POLYETHYLENE GLYCOL 3350 17 G/17G
17 POWDER, FOR SOLUTION ORAL DAILY
DISCHARGE
Start: 2024-02-12 | End: 2024-03-14 | Stop reason: DRUGHIGH

## 2024-02-12 RX ORDER — ACETAMINOPHEN 325 MG/1
650 TABLET ORAL EVERY 4 HOURS PRN
DISCHARGE
Start: 2024-02-12 | End: 2024-03-13 | Stop reason: DRUGHIGH

## 2024-02-12 RX ORDER — ACETAMINOPHEN 325 MG/1
650 TABLET ORAL 4 TIMES DAILY
Status: DISCONTINUED | OUTPATIENT
Start: 2024-02-12 | End: 2024-02-12

## 2024-02-12 RX ADMIN — SENNOSIDES AND DOCUSATE SODIUM 1 TABLET: 50; 8.6 TABLET ORAL at 20:37

## 2024-02-12 RX ADMIN — DIVALPROEX SODIUM 125 MG: 125 TABLET, DELAYED RELEASE ORAL at 11:13

## 2024-02-12 RX ADMIN — DONEPEZIL HYDROCHLORIDE 5 MG: 5 TABLET, FILM COATED ORAL at 08:10

## 2024-02-12 RX ADMIN — ASPIRIN 81 MG: 81 TABLET, COATED ORAL at 20:37

## 2024-02-12 RX ADMIN — SENNOSIDES AND DOCUSATE SODIUM 1 TABLET: 50; 8.6 TABLET ORAL at 08:10

## 2024-02-12 RX ADMIN — ASPIRIN 81 MG: 81 TABLET, COATED ORAL at 08:10

## 2024-02-12 RX ADMIN — Medication 1 MG: at 22:38

## 2024-02-12 RX ADMIN — DIVALPROEX SODIUM 125 MG: 125 TABLET, DELAYED RELEASE ORAL at 08:10

## 2024-02-12 RX ADMIN — ESCITALOPRAM OXALATE 20 MG: 20 TABLET, FILM COATED ORAL at 08:10

## 2024-02-12 RX ADMIN — MEMANTINE 10 MG: 10 TABLET ORAL at 08:10

## 2024-02-12 RX ADMIN — ACETAMINOPHEN 650 MG: 325 TABLET, FILM COATED ORAL at 20:44

## 2024-02-12 RX ADMIN — QUETIAPINE FUMARATE 50 MG: 50 TABLET ORAL at 08:10

## 2024-02-12 RX ADMIN — MEMANTINE 10 MG: 10 TABLET ORAL at 20:38

## 2024-02-12 RX ADMIN — QUETIAPINE FUMARATE 50 MG: 50 TABLET ORAL at 20:37

## 2024-02-12 RX ADMIN — Medication 50 MCG: at 08:10

## 2024-02-12 RX ADMIN — POLYETHYLENE GLYCOL 3350 17 G: 17 POWDER, FOR SOLUTION ORAL at 08:10

## 2024-02-12 ASSESSMENT — ACTIVITIES OF DAILY LIVING (ADL)
ADLS_ACUITY_SCORE: 50
ADLS_ACUITY_SCORE: 48
ADLS_ACUITY_SCORE: 50
ADLS_ACUITY_SCORE: 52
ADLS_ACUITY_SCORE: 48
ADLS_ACUITY_SCORE: 50
ADLS_ACUITY_SCORE: 48
ADLS_ACUITY_SCORE: 48
ADLS_ACUITY_SCORE: 52
ADLS_ACUITY_SCORE: 52

## 2024-02-12 NOTE — PROGRESS NOTES
Orthopedic Surgery  Mildred Connelly  02/12/2024     Admit Date:  2/7/2024    POD: 4 Days Post-Op   Procedure(s):  OPEN REDUCTION INTERNAL FIXATION RIGHT HIP FRACTURE WITH INTRAMEDULLARY NAIL.    Baseline advanced Alzheimer's dementia.   Patient reports that pain to right hip is improved today.  Denies numbness or tingling to the RLE.  Denies muscle spasms.  Tolerating oral intake.    Afebrile, VSS.   No acute events overnight.  Dispo recommendations are pending. TCU declined the patient, memory care is unsure if they can accommodate the patient at this time.    Temp:  [98.6  F (37  C)-99.6  F (37.6  C)] 99.3  F (37.4  C)  Pulse:  [82-97] 85  Resp:  [16] 16  BP: (111-130)/(58-67) 114/58  SpO2:  [90 %-97 %] 91 %    Alert and oriented to self. NAD. Breathing comfortably ORA.   Right hip Aquacel dressing (proximal) is clean, dry, and intact. Two distal incisions are exposed without dressing. Staples are intact and there is no dehiscence or drainage. New Aquacels placed.  Minimal erythema of the surrounding skin.   Mild swelling to the right lateral hip and thigh.   Thigh remains soft and compressible.  Bilateral calves are soft, seemingly non-tender.  Able to DF and PF the ankles bilaterally.  DP pulse palpable.  SILT.    Labs/Imaging:  Recent Labs   Lab Test 02/12/24  0707 02/10/24  0743 02/09/24  0801 02/07/24  0901   WBC 9.2  --   --  10.4   HGB 9.9* 9.3* 10.4* 12.8     --   --  186     A/P    1. S/p right intertrochanteric femur fracture ORIF using CM device (DOS: 2/8/24)  -Continue ASA 81 mg BID x 6 weeks for DVT prophylaxis.    -Mobilize with PT/OT.  -WBAT RLE with walker.    -Continue current pain regimen.  -Dressings: Keep current dressings in place. Okay for RN to change dressings if peeling or saturated >60%.  -Follow-up: 2 weeks post-op with Dr. Josiah Dick    2. Disposition  -Anticipate d/c to [facility] vs TCU when medically cleared and progressing in PT. Ortho stable.    Deena Malik PA-C  [facility]  Orthopedics

## 2024-02-12 NOTE — PROGRESS NOTES
"[facility]    Medicine Progress Note - Hospitalist Service    Date of Admission:  2/7/2024    Assessment & Plan   Mildred Connelly is a 73 year old female admitted on 2/7/2024. Her past medical history is significant for only Alzheimer's Disease, for which she is followed at the [facility]. Presented by ambulance from her Memory Care Facility after being found down for an unknown period of time. Imaging revealed a right hip fracture.      Right hip intertrochanteric fracture s/p ORIF 2/8/24  Unwitnessed fall, suspected mechanical  Acute blood loss anemia secondary to fracture/surgery:   - Appears to be doing well post-op.  Post-op site cares, activity restrictions, pain medications, DVT proph per orthopedics.  Modest hgb decline with surgery, no indication for a current transfusion.  - TCU on discharge; [facility] care likely cannot accommodate her with her short term mobility challenges.  -See Ortho note 2/12/2024,  \"-Continue ASA 81 mg BID x 6 weeks for DVT prophylaxis.    -Mobilize with PT/OT.  -WBAT RLE with walker.    -Continue current pain regimen. Adding 10 mg Atarax q 4 hours as pain adjunct.   -Dressings: Keep current dressing in place. Okay for RN to change dressings if peeling or saturated >60%.  -Follow-up: 2 weeks post-op with Dr. Josiah Dick  -Anticipate d/c to  vs TCU\"      Fevers  hypotension  Noted to have fever 2/10/2024 recorded at 101.2  Continue with IS and up out of bed  -since then T max 99; no leukocytosis.  Hemoglobin stable.  No reported wound issues.  Continue monitor.  -UA/UC, mixed urogenital tapan.    Urinary retention -  post op  Du removed as per post-op instructions on 2/10/24  Straight cathed x  1 this am f or  700 ml  Continue to encourage up in chair and  up out of bed  Will encourage po intake now that IVF have been stopped (she doesn't remember to eat/drink) and so will order assisted feedings    Alzheimer's Disease:  Per daughter, patient's baseline " "mental status is alert to self only. She is at her baseline. Follows at [facility].  She answers basic questions well.  This chronic disease condition does place her at higher overall risk for post-op complications  - Continue PTA seroquel, aricept, namenda, VPA      Hyperlipidemia   - Stable       Diet: Advance Diet as Tolerated: Regular Diet Adult    DVT Prophylaxis: ASA 81 mg po bid as per ortho  Du Catheter: Not present  Lines: None     Cardiac Monitoring: None  Code Status: Full Code      Clinically Significant Risk Factors                      # Dementia: noted on problem list    # Overweight: Estimated body mass index is 25.06 kg/m  as calculated from the following:    Height as of this encounter: 1.702 m (5' 7\").    Weight as of this encounter: 72.6 kg (160 lb).             Disposition Plan      Expected Discharge Date: 02/13/2024        Discharge Comments: pending placement        Peter Hurley MD  Hospitalist Service  [facility]  Securely message with [employer] (more info)  Text page via [employer] Paging/Directory     Total time 50 minutes for today 2/12/2024 :   time consisted of the following, assuming Patient care with review of records including labs, imaging results, medications, interdisciplinary notes; examination of Patient;  and completing documentation and orders.  Care Management included counseling/discussion with Patient and Daughter regarding current condition including ORIF and baseline cog fxn and discharge planning , and Coordination of Care time with Nursing  and Specialists, Ortho regarding care plan, management and surveillance; as above.    ___________________________  ___________________________________________    Interval History   Daughter present during encounter.  Patient denies pain.  Daughter states patient eating poorly.  Participates in Therapies.  Patient denies pain.  Nursing reports no wound issues.    Physical Exam   Vital Signs: Temp: 99.3  F " (37.4  C) Temp src: Oral BP: 114/58 Pulse: 85   Resp: 16 SpO2: 91 % O2 Device: None (Room air)    Weight: 160 lbs 0 oz    General/Constitutional:   NAD, awake calm, cooperative    Chest/Respiratory: Respirations nonlabored room air  Cardiovascular:  regular, no murmur appreciated.  Gastrointestinal/Abdomen:  soft, nontender,   MSK.  Surgical wound dressed.  Distal digits intact.  Neuro.  Gross motor tested, nonfocal, ambulation not tested.  Psych oriented x 1, affect calm      Medications        aspirin  81 mg Oral BID    divalproex sodium delayed-release  125 mg Oral BID    donepezil  5 mg Oral QAM    escitalopram  20 mg Oral Daily    melatonin  1 mg Oral At Bedtime    memantine  10 mg Oral BID    polyethylene glycol  17 g Oral Daily    QUEtiapine  50 mg Oral BID    senna-docusate  1 tablet Oral BID    sodium chloride (PF)  3 mL Intracatheter Q8H    cholecalciferol  50 mcg Oral Daily       Data     Labs and Imaging results below reviewed today.  Recent Labs   Lab 02/12/24  0707 02/10/24  0743 02/09/24  0801 02/07/24  0901   WBC 9.2  --   --  10.4   HGB 9.9* 9.3* 10.4* 12.8   HCT 30.5*  --   --  39.6   MCV 97  --   --  98     --   --  186     Recent Labs   Lab 02/12/24  0707 02/11/24  0353 02/10/24  0743 02/10/24  0541 02/09/24  2207 02/09/24  0801 02/08/24  0628 02/07/24  0901     --   --   --   --  142  --  143   POTASSIUM 3.5 3.6 3.5  --   --  3.9   < > 3.8   CHLORIDE 103  --   --   --   --  104  --  104   CO2 27  --   --   --   --  28  --  30*   ANIONGAP 10  --   --   --   --  10  --  9   GLC 94  --  88 93   < > 115*  --  123*   BUN 16.8  --   --   --   --  16.3  --  22.4   CR 0.64  --   --   --   --  0.60  --  0.77   GFRESTIMATED >90  --   --   --   --  >90  --  82   EV 8.8  --   --   --   --  8.6*  --  8.9    < > = values in this interval not displayed.     Recent Labs   Lab 02/12/24  0707 02/11/24  0353 02/10/24  0743 02/10/24  0541 02/09/24  2207 02/09/24  0801 02/07/24  1712 02/07/24  0901      --   --   --   --  142  --  143   POTASSIUM 3.5 3.6 3.5  --   --  3.9   < > 3.8   CHLORIDE 103  --   --   --   --  104  --  104   CO2 27  --   --   --   --  28  --  30*   ANIONGAP 10  --   --   --   --  10  --  9   GLC 94  --  88 93   < > 115*  --  123*   BUN 16.8  --   --   --   --  16.3  --  22.4   CR 0.64  --   --   --   --  0.60  --  0.77   GFRESTIMATED >90  --   --   --   --  >90  --  82   EV 8.8  --   --   --   --  8.6*  --  8.9   MAG 2.1 2.1 2.0  --   --  2.2   < >  --    PHOS 3.3 3.9 1.8*  --   --  2.5   < >  --     < > = values in this interval not displayed.

## 2024-02-12 NOTE — PROGRESS NOTES
Patient vital signs are at baseline: Yes  Patient able to ambulate as they were prior to admission or with assist devices provided by therapies during their stay:  No,  Reason:  Needs assistance of 2 with use of Sera Steady  Patient MUST void prior to discharge:  Yes  Patient able to tolerate oral intake:  Yes  Pain has adequate pain control using Oral analgesics:  Yes  Does patient have an identified :  Yes  Has goal D/C date and time been discussed with patient:  TBD Discharge to TCU or [facility].     A&O to self only, Baseline Dementia.Voids in BR. R hip dressings are CDI. CMS intact, ASHLEY fully due to Dementia. Denies pain at rest. Peripheral IV saline Lock patent. Turn and reposition every 2 hours. RN managed Magnesium, Phosphorus, and Potassium

## 2024-02-12 NOTE — PROGRESS NOTES
Care Management Follow Up    Length of Stay (days): 5    Expected Discharge Date: 02/13/2024     Concerns to be Addressed:       Patient plan of care discussed at interdisciplinary rounds: Yes    Anticipated Discharge Disposition: Transitional Care, Skilled Nursing Facility     Anticipated Discharge Services: None  Anticipated Discharge DME: None    Patient/family educated on Medicare website which has current facility and service quality ratings: no  Education Provided on the Discharge Plan:    Patient/Family in Agreement with the Plan: yes    Referrals Placed by CM/SW: Post Acute Facilities  Private pay costs discussed: Not applicable    Additional Information:  [facility] to [facility] and spoke to LOURDES Wen. She confirmed that DON would prefer that patient go to TCU prior to returning. She asked about patient's weight bear status and current mobility. Bettye noted that patient was independent prior to admission and so they'd like to see her closer to baseline. She noted that they have had residents go to [facility], noted [facility] (which family thought was a little too far) and [facility].     Writer spoke to family and they agreed to [facility] and [facility]. They are open to facilities in [place] as well. Writer reviewed Medicare.gov and additional referrals sent to [facility] and [facility] via [facility].    SALAZAR Garcia

## 2024-02-12 NOTE — PLAN OF CARE
Goal Outcome Evaluation:       Pt Alert to self, baseline dementia. VSS on RA. Regular diet. Family at bedside. Up 2A w/ Sera steady. Up in chair for meals. Voiding in BR. RLE dressing CDI, CMS intact. Denies pain at rest, PRNs available. R PIV SL. On K+, Mag and Phos protocol, all rechecks set for tomorrow 02/13 AM. Discharge to TCU vs nursing home TBD. Continue plan of care.

## 2024-02-13 ENCOUNTER — APPOINTMENT (OUTPATIENT)
Dept: PHYSICAL THERAPY | Facility: CLINIC | Age: 74
DRG: 481 | End: 2024-02-13
Payer: MEDICARE

## 2024-02-13 LAB
ERYTHROCYTE [DISTWIDTH] IN BLOOD BY AUTOMATED COUNT: 13.2 % (ref 10–15)
HCT VFR BLD AUTO: 28.5 % (ref 35–47)
HGB BLD-MCNC: 9.3 G/DL (ref 11.7–15.7)
MAGNESIUM SERPL-MCNC: 2.1 MG/DL (ref 1.7–2.3)
MCH RBC QN AUTO: 31.5 PG (ref 26.5–33)
MCHC RBC AUTO-ENTMCNC: 32.6 G/DL (ref 31.5–36.5)
MCV RBC AUTO: 97 FL (ref 78–100)
PHOSPHATE SERPL-MCNC: 4 MG/DL (ref 2.5–4.5)
PLATELET # BLD AUTO: 202 10E3/UL (ref 150–450)
POTASSIUM SERPL-SCNC: 3.6 MMOL/L (ref 3.4–5.3)
RBC # BLD AUTO: 2.95 10E6/UL (ref 3.8–5.2)
WBC # BLD AUTO: 7.7 10E3/UL (ref 4–11)

## 2024-02-13 PROCEDURE — 84132 ASSAY OF SERUM POTASSIUM: CPT | Performed by: HOSPITALIST

## 2024-02-13 PROCEDURE — 120N000001 HC R&B MED SURG/OB

## 2024-02-13 PROCEDURE — 250N000013 HC RX MED GY IP 250 OP 250 PS 637: Performed by: INTERNAL MEDICINE

## 2024-02-13 PROCEDURE — 84100 ASSAY OF PHOSPHORUS: CPT | Performed by: HOSPITALIST

## 2024-02-13 PROCEDURE — 250N000013 HC RX MED GY IP 250 OP 250 PS 637: Performed by: HOSPITALIST

## 2024-02-13 PROCEDURE — 85027 COMPLETE CBC AUTOMATED: CPT | Performed by: HOSPITALIST

## 2024-02-13 PROCEDURE — 36415 COLL VENOUS BLD VENIPUNCTURE: CPT | Performed by: HOSPITALIST

## 2024-02-13 PROCEDURE — 97530 THERAPEUTIC ACTIVITIES: CPT | Mod: GP

## 2024-02-13 PROCEDURE — 250N000013 HC RX MED GY IP 250 OP 250 PS 637: Performed by: STUDENT IN AN ORGANIZED HEALTH CARE EDUCATION/TRAINING PROGRAM

## 2024-02-13 PROCEDURE — 97116 GAIT TRAINING THERAPY: CPT | Mod: GP

## 2024-02-13 PROCEDURE — 83735 ASSAY OF MAGNESIUM: CPT | Performed by: HOSPITALIST

## 2024-02-13 PROCEDURE — 99231 SBSQ HOSP IP/OBS SF/LOW 25: CPT | Performed by: HOSPITALIST

## 2024-02-13 RX ADMIN — ESCITALOPRAM OXALATE 20 MG: 20 TABLET, FILM COATED ORAL at 08:38

## 2024-02-13 RX ADMIN — DIVALPROEX SODIUM 125 MG: 125 TABLET, DELAYED RELEASE ORAL at 08:39

## 2024-02-13 RX ADMIN — ASPIRIN 81 MG: 81 TABLET, COATED ORAL at 08:38

## 2024-02-13 RX ADMIN — ASPIRIN 81 MG: 81 TABLET, COATED ORAL at 21:14

## 2024-02-13 RX ADMIN — SENNOSIDES AND DOCUSATE SODIUM 1 TABLET: 50; 8.6 TABLET ORAL at 08:38

## 2024-02-13 RX ADMIN — ACETAMINOPHEN 650 MG: 325 TABLET, FILM COATED ORAL at 13:08

## 2024-02-13 RX ADMIN — QUETIAPINE FUMARATE 50 MG: 50 TABLET ORAL at 21:14

## 2024-02-13 RX ADMIN — POLYETHYLENE GLYCOL 3350 17 G: 17 POWDER, FOR SOLUTION ORAL at 08:39

## 2024-02-13 RX ADMIN — MEMANTINE 10 MG: 10 TABLET ORAL at 08:38

## 2024-02-13 RX ADMIN — Medication 50 MCG: at 08:38

## 2024-02-13 RX ADMIN — Medication 1 MG: at 21:14

## 2024-02-13 RX ADMIN — QUETIAPINE FUMARATE 50 MG: 50 TABLET ORAL at 08:39

## 2024-02-13 RX ADMIN — SENNOSIDES AND DOCUSATE SODIUM 1 TABLET: 50; 8.6 TABLET ORAL at 21:14

## 2024-02-13 RX ADMIN — ACETAMINOPHEN 650 MG: 325 TABLET, FILM COATED ORAL at 21:14

## 2024-02-13 RX ADMIN — DIVALPROEX SODIUM 125 MG: 125 TABLET, DELAYED RELEASE ORAL at 13:08

## 2024-02-13 RX ADMIN — DONEPEZIL HYDROCHLORIDE 5 MG: 5 TABLET, FILM COATED ORAL at 08:37

## 2024-02-13 RX ADMIN — MEMANTINE 10 MG: 10 TABLET ORAL at 21:14

## 2024-02-13 ASSESSMENT — ACTIVITIES OF DAILY LIVING (ADL)
ADLS_ACUITY_SCORE: 52

## 2024-02-13 NOTE — PROGRESS NOTES
Care Management Follow Up    Length of Stay (days): 6    Expected Discharge Date: 02/14/2024     Concerns to be Addressed:       Patient plan of care discussed at interdisciplinary rounds: Yes    Anticipated Discharge Disposition: Transitional Care, Skilled Nursing Facility     Anticipated Discharge Services: None  Anticipated Discharge DME: None    Patient/family educated on Medicare website which has current facility and service quality ratings: no  Education Provided on the Discharge Plan:    Patient/Family in Agreement with the Plan: yes    Referrals Placed by CM/SW: Post Acute Facilities  Private pay costs discussed: Not applicable    Additional Information:  Writer spoke with family regarding discharge planning. They were updated that [facility] had accepted her. They would like a facility closer to the [facility]. Writer explained that [facility] would not be able to take the patient due to their mentation. They stated they had spoken with a doctor who felt that notes were not accurate about mentation.     Lui [facility]IRSCHR[facility]  Care [facility]

## 2024-02-13 NOTE — PLAN OF CARE
Goal Outcome Evaluation:    [id number]    Dx: ORIF R hip   Mental Statues: Alert to self. Baseline dementia   VS/O2: VSS on RA  Activity: A2 sera steady   Diet: regular   Bowel/bladder: Voiding in bathroom   Skin: Dressing is CDI.   LDAs: IV saline locked   Discharge: Pending

## 2024-02-13 NOTE — PLAN OF CARE
Diagnosis: Right hip ORIF.  POD#: 4.  Mental Status: Alert to self.  Activity/dangle Assist of 2 with sera steady.  Diet: Regular.  Pain: Managed with Tylenol.  Du/Voiding: in the bathroom.  Tele/Restraints/Iso: None.  02/LDA: PIV SL. Room air.  D/C Date: VSS, dressing clean dry and intact, K+, Mag and phos protocol rechecks tomorrow. plan of care.

## 2024-02-13 NOTE — PROGRESS NOTES
"[facility]    Medicine Progress Note - Hospitalist Service    Date of Admission:  2/7/2024    Assessment & Plan   Mildred Connelly is a 73 year old female admitted on 2/7/2024. Her past medical history is significant for only Alzheimer's Disease, for which she is followed at the [facility]. Presented by ambulance from her [facility] after being found down for an unknown period of time. Imaging revealed a right hip fracture.      Right hip intertrochanteric fracture s/p ORIF 2/8/24  Unwitnessed fall, suspected mechanical  Acute blood loss anemia secondary to fracture/surgery:   - Appears to be doing well post-op.  Post-op site cares, activity restrictions, pain medications, DVT proph per orthopedics.  Modest hgb decline with surgery, no indication for a current transfusion.  - TCU on discharge; [facility] care likely cannot accommodate her with her short term mobility challenges.  -See Ortho note 2/12/2024,  \"-Continue ASA 81 mg BID x 6 weeks for DVT prophylaxis.    -Mobilize with PT/OT.  -WBAT RLE with walker.    -Continue current pain regimen. Adding 10 mg Atarax q 4 hours as pain adjunct.   -Dressings: Keep current dressing in place. Okay for RN to change dressings if peeling or saturated >60%.  -Follow-up: 2 weeks post-op with Dr. Josiah Dick  -Anticipate d/c to TCU, participates with Therapies.      Fevers, resolved  hypotension, resolved  Noted to have fever 2/10/2024 recorded at 101.2  Continue with IS and up out of bed  -since then T max 99; no leukocytosis.  Hemoglobin stable.  No reported wound issues.  Continue monitor.  -UA/UC, mixed urogenital tapan.    Urinary retention -  post op, resolved  Du removed as per post-op instructions on 2/10/24  Straight cathed x  1 this am f or  700 ml  Continue to encourage up in chair and  up out of bed  Will encourage po intake now that IVF have been stopped (she doesn't remember to eat/drink) and so will order assisted " "feedings    Alzheimer's Disease:  Per daughter, patient's baseline mental status is alert to self only. She is at her baseline. Follows at [facility].  She answers basic questions well.  This chronic disease condition does place her at higher overall risk for post-op complications  - Continue PTA seroquel, aricept, namenda, VPA   -Appears at baseline, family supportive, no acute behavioral issues.    Hyperlipidemia   - Stable       Diet: Advance Diet as Tolerated: Regular Diet Adult    DVT Prophylaxis: ASA 81 mg po bid as per ortho  Du Catheter: Not present  Lines: None     Cardiac Monitoring: None  Code Status: Full Code      Clinically Significant Risk Factors                      # Dementia: noted on problem list    # Overweight: Estimated body mass index is 25.06 kg/m  as calculated from the following:    Height as of this encounter: 1.702 m (5' 7\").    Weight as of this encounter: 72.6 kg (160 lb).             Disposition Plan      Expected Discharge Date: 02/14/2024        Medically stable for discharge to [facility]     Peter Hurley MD  Hospitalist Service  [facility]  Securely message with [employer] (more info)  Text page via [employer] Paging/Directory     ___________________________  ___________________________________________    Interval History   Daughter present during encounter.  Patient denies pain.  Participates with Therapies.  Nursing reports no wound issues.     Physical Exam   Vital Signs: Temp: 98.1  F (36.7  C) Temp src: Oral BP: 125/58 Pulse: 83   Resp: 18 SpO2: 97 % O2 Device: None (Room air)    Weight: 160 lbs 0 oz    General/Constitutional:   NAD, awake, calm, cooperative.  Chest/Respiratory: Respirations nonlabored room air  Cardiovascular:  regular, no murmur appreciated.  Gastrointestinal/Abdomen:  soft, nontender,   MSK.  Surgical wound dressed.  Distal digits intact.  Neuro.  Gross motor tested, nonfocal, uses EZ stand for ambulation, two-person assist.  Psych " oriented x 1, affect calm      Medications        aspirin  81 mg Oral BID    divalproex sodium delayed-release  125 mg Oral BID    donepezil  5 mg Oral QAM    escitalopram  20 mg Oral Daily    melatonin  1 mg Oral At Bedtime    memantine  10 mg Oral BID    polyethylene glycol  17 g Oral Daily    QUEtiapine  50 mg Oral BID    senna-docusate  1 tablet Oral BID    sodium chloride (PF)  3 mL Intracatheter Q8H    cholecalciferol  50 mcg Oral Daily       Data     Labs and Imaging results below reviewed today.  Recent Labs   Lab 02/13/24  0712 02/12/24  0707 02/10/24  0743 02/09/24  0801 02/07/24  0901   WBC 7.7 9.2  --   --  10.4   HGB 9.3* 9.9* 9.3*   < > 12.8   HCT 28.5* 30.5*  --   --  39.6   MCV 97 97  --   --  98    208  --   --  186    < > = values in this interval not displayed.     Recent Labs   Lab 02/13/24 0712 02/12/24 0707 02/11/24  0353 02/10/24  0743 02/10/24  0541 02/09/24 2207 02/09/24 0801 02/08/24 0628 02/07/24  0901   NA  --  140  --   --   --   --  142  --  143   POTASSIUM 3.6 3.5 3.6 3.5  --   --  3.9   < > 3.8   CHLORIDE  --  103  --   --   --   --  104  --  104   CO2  --  27  --   --   --   --  28  --  30*   ANIONGAP  --  10  --   --   --   --  10  --  9   GLC  --  94  --  88 93   < > 115*  --  123*   BUN  --  16.8  --   --   --   --  16.3  --  22.4   CR  --  0.64  --   --   --   --  0.60  --  0.77   GFRESTIMATED  --  >90  --   --   --   --  >90  --  82   EV  --  8.8  --   --   --   --  8.6*  --  8.9    < > = values in this interval not displayed.     Recent Labs   Lab 02/13/24 0712 02/12/24 0707 02/11/24 0353 02/10/24  0743 02/10/24  0541 02/09/24  2207 02/09/24 0801 02/07/24  1712 02/07/24 0901   NA  --  140  --   --   --   --  142  --  143   POTASSIUM 3.6 3.5 3.6 3.5  --   --  3.9   < > 3.8   CHLORIDE  --  103  --   --   --   --  104  --  104   CO2  --  27  --   --   --   --  28  --  30*   ANIONGAP  --  10  --   --   --   --  10  --  9   GLC  --  94  --  88 93   < > 115*  --   123*   BUN  --  16.8  --   --   --   --  16.3  --  22.4   CR  --  0.64  --   --   --   --  0.60  --  0.77   GFRESTIMATED  --  >90  --   --   --   --  >90  --  82   EV  --  8.8  --   --   --   --  8.6*  --  8.9   MAG 2.1 2.1 2.1 2.0  --   --  2.2   < >  --    PHOS 4.0 3.3 3.9 1.8*  --   --  2.5   < >  --     < > = values in this interval not displayed.

## 2024-02-14 VITALS
OXYGEN SATURATION: 100 % | HEIGHT: 67 IN | TEMPERATURE: 98.6 F | HEART RATE: 76 BPM | RESPIRATION RATE: 16 BRPM | DIASTOLIC BLOOD PRESSURE: 57 MMHG | SYSTOLIC BLOOD PRESSURE: 118 MMHG | BODY MASS INDEX: 25.11 KG/M2 | WEIGHT: 160 LBS

## 2024-02-14 LAB
MAGNESIUM SERPL-MCNC: 2.1 MG/DL (ref 1.7–2.3)
PHOSPHATE SERPL-MCNC: 3.5 MG/DL (ref 2.5–4.5)
POTASSIUM SERPL-SCNC: 3.6 MMOL/L (ref 3.4–5.3)

## 2024-02-14 PROCEDURE — 99239 HOSP IP/OBS DSCHRG MGMT >30: CPT | Performed by: HOSPITALIST

## 2024-02-14 PROCEDURE — 250N000013 HC RX MED GY IP 250 OP 250 PS 637: Performed by: INTERNAL MEDICINE

## 2024-02-14 PROCEDURE — 250N000013 HC RX MED GY IP 250 OP 250 PS 637: Performed by: STUDENT IN AN ORGANIZED HEALTH CARE EDUCATION/TRAINING PROGRAM

## 2024-02-14 PROCEDURE — 36415 COLL VENOUS BLD VENIPUNCTURE: CPT | Performed by: HOSPITALIST

## 2024-02-14 PROCEDURE — 83735 ASSAY OF MAGNESIUM: CPT | Performed by: HOSPITALIST

## 2024-02-14 PROCEDURE — 84132 ASSAY OF SERUM POTASSIUM: CPT | Performed by: HOSPITALIST

## 2024-02-14 PROCEDURE — 84100 ASSAY OF PHOSPHORUS: CPT | Performed by: HOSPITALIST

## 2024-02-14 RX ORDER — QUETIAPINE FUMARATE 25 MG/1
TABLET, FILM COATED ORAL
DISCHARGE
Start: 2024-02-14 | End: 2024-03-14 | Stop reason: DRUGHIGH

## 2024-02-14 RX ADMIN — ASPIRIN 81 MG: 81 TABLET, COATED ORAL at 08:46

## 2024-02-14 RX ADMIN — DIVALPROEX SODIUM 125 MG: 125 TABLET, DELAYED RELEASE ORAL at 12:45

## 2024-02-14 RX ADMIN — QUETIAPINE FUMARATE 50 MG: 50 TABLET ORAL at 08:46

## 2024-02-14 RX ADMIN — MEMANTINE 10 MG: 10 TABLET ORAL at 08:46

## 2024-02-14 RX ADMIN — ACETAMINOPHEN 650 MG: 325 TABLET, FILM COATED ORAL at 08:46

## 2024-02-14 RX ADMIN — DONEPEZIL HYDROCHLORIDE 5 MG: 5 TABLET, FILM COATED ORAL at 08:46

## 2024-02-14 RX ADMIN — ESCITALOPRAM OXALATE 20 MG: 20 TABLET, FILM COATED ORAL at 08:46

## 2024-02-14 RX ADMIN — POLYETHYLENE GLYCOL 3350 17 G: 17 POWDER, FOR SOLUTION ORAL at 08:46

## 2024-02-14 RX ADMIN — DIVALPROEX SODIUM 125 MG: 125 TABLET, DELAYED RELEASE ORAL at 08:46

## 2024-02-14 RX ADMIN — SENNOSIDES AND DOCUSATE SODIUM 1 TABLET: 50; 8.6 TABLET ORAL at 08:46

## 2024-02-14 RX ADMIN — Medication 50 MCG: at 08:46

## 2024-02-14 ASSESSMENT — ACTIVITIES OF DAILY LIVING (ADL)
ADLS_ACUITY_SCORE: 52
ADLS_ACUITY_SCORE: 52
ADLS_ACUITY_SCORE: 47
ADLS_ACUITY_SCORE: 52
ADLS_ACUITY_SCORE: 47
ADLS_ACUITY_SCORE: 49
ADLS_ACUITY_SCORE: 52

## 2024-02-14 NOTE — PROGRESS NOTES
Care Management Discharge Note    Discharge Date: 02/14/2024       Discharge Disposition: Transitional Care, Skilled Nursing Facility    Discharge Services: None    Discharge DME: None    Discharge Transportation: family or friend will provide    Private pay costs discussed: Not applicable    Does the patient's insurance plan have a 3 day qualifying hospital stay waiver?  no  PAS Confirmation Code:    Patient/family educated on Medicare website which has current facility and service quality ratings: no    Education Provided on the Discharge Plan:  Yes  Persons Notified of Discharge Plans: Patient, Family, Transport, Facility, MD, Bedside Nurse, Charge nurse and HUC.   Patient/Family in Agreement with the Plan: yes    Handoff Referral Completed: no    Additional Information:  [facility] Writer spoke with Erika in admissions and she would be able to take the patient. Patient will be transported from 3:10 to 3:50 to [facility] by [facility] transport. She will have a wheel chair ride. Scripts have been faxed to the TCU. PAS has been completed and is on chart. Patient will discharge today from [facility] to [facility].     [facility]  Care Management

## 2024-02-14 NOTE — PLAN OF CARE
Trauma/Ortho/Medical (Choose one) Trauma    Diagnosis: ORIF right hip  POD#:6  Mental Status:Alert to self  Activity/dangle Assist of 2 with eren steady  Diet: reg  Pain: tylenol  Du/Voiding: Voiding in bathroom, incontinent at times  Tele/Restraints/Iso:NA  02/LDA:RA/ No iv access  D/C Date: Discharging to [facility] TCU between 3:10 to 3:50  Other Info: Aquacel dressing with small dried drainage.

## 2024-02-14 NOTE — PLAN OF CARE
Goal Outcome Evaluation:        Diagnosis: ORIF Right hip  POD#:5  Mental Status:A & O to self mostly, inconsistent with place and situation  Activity/dangle: Up with 2, gait belt and sera steady  Diet:Regular  Pain:tylenol  Du/Voiding:Voiding  in BR  Tele/Restraints/Iso:NA  02/LDA:ARACELIS PARK  D/C Date:pending TCU placement.  Other Info:dressing was changed for 2 incision that the pt peeled off. Poor appetite.

## 2024-02-14 NOTE — PLAN OF CARE
"  Problem: Adult Inpatient Plan of Care  Goal: Plan of Care Review  Description: The Plan of Care Review/Shift note should be completed every shift.  The Outcome Evaluation is a brief statement about your assessment that the patient is improving, declining, or no change.  This information will be displayed automatically on your shift  note.  Outcome: Progressing  Goal: Patient-Specific Goal (Individualized)  Description: You can add care plan individualizations to a care plan. Examples of Individualization might be:  \"Parent requests to be called daily at 9am for status\", \"I have a hard time hearing out of my right ear\", or \"Do not touch me to wake me up as it startles  me\".  Outcome: Progressing  Goal: Absence of Hospital-Acquired Illness or Injury  Outcome: Progressing  Intervention: Identify and Manage Fall Risk  Recent Flowsheet Documentation  Taken 2/14/2024 0200 by Spike Johnson RN  Safety Promotion/Fall Prevention: safety round/check completed  Taken 2/13/2024 2000 by Spike Johnson RN  Safety Promotion/Fall Prevention: safety round/check completed  Intervention: Prevent Skin Injury  Recent Flowsheet Documentation  Taken 2/14/2024 0200 by Spike Johnson RN  Body Position: position changed independently  Taken 2/13/2024 2000 by Spike Johnson RN  Body Position:   turned   right   left   weight shifting  Intervention: Prevent and Manage VTE (Venous Thromboembolism) Risk  Recent Flowsheet Documentation  Taken 2/14/2024 0200 by Spike Johnson RN  VTE Prevention/Management: SCDs (sequential compression devices) off  Taken 2/13/2024 2000 by Spike Johnson RN  VTE Prevention/Management: SCDs (sequential compression devices) off  Goal: Optimal Comfort and Wellbeing  Outcome: Progressing  Goal: Readiness for Transition of Care  Outcome: Progressing     Problem: Risk for Delirium  Goal: Optimal Coping  Outcome: Progressing  Goal: Improved Behavioral Control  Outcome: " Progressing  Intervention: Minimize Safety Risk  Recent Flowsheet Documentation  Taken 2/14/2024 0200 by Spike Johnson RN  Communication Enhancement Strategies: call light answered in person  Enhanced Safety Measures: room near unit station  Taken 2/13/2024 2000 by Spike Johnson RN  Communication Enhancement Strategies: call light answered in person  Enhanced Safety Measures: room near unit station  Goal: Improved Attention and Thought Clarity  Outcome: Progressing  Intervention: Maximize Cognitive Function  Recent Flowsheet Documentation  Taken 2/14/2024 0200 by Spike Johnson RN  Reorientation Measures:   reorientation provided   clock in view  Taken 2/13/2024 2000 by Spike Johnson RN  Reorientation Measures:   reorientation provided   clock in view  Goal: Improved Sleep  Outcome: Progressing     Problem: Pain Acute  Goal: Optimal Pain Control and Function  Outcome: Progressing  Intervention: Prevent or Manage Pain  Recent Flowsheet Documentation  Taken 2/14/2024 0200 by Spike Johnson RN  Medication Review/Management: medications reviewed  Taken 2/13/2024 2000 by Spike Johnson RN  Medication Review/Management: medications reviewed     Problem: Orthopaedic Fracture  Goal: Absence of Bleeding  Outcome: Progressing  Goal: Bowel Elimination  Outcome: Progressing  Goal: Absence of Embolism Signs and Symptoms  Outcome: Progressing  Intervention: Prevent or Manage Embolism Risk  Recent Flowsheet Documentation  Taken 2/14/2024 0200 by Spike Johnson RN  VTE Prevention/Management: SCDs (sequential compression devices) off  Taken 2/13/2024 2000 by Spike Johnson RN  VTE Prevention/Management: SCDs (sequential compression devices) off  Goal: Fracture Stability  Outcome: Progressing  Goal: Optimal Functional Ability  Outcome: Progressing  Intervention: Optimize Functional Ability  Recent Flowsheet Documentation  Taken 2/14/2024 0200 by Spike Johnson  RN  Activity Management: activity adjusted per tolerance  Positioning/Transfer Devices:   pillows   in use  Taken 2/13/2024 2000 by Spike Johnson RN  Positioning/Transfer Devices:   pillows   in use  Goal: Absence of Infection Signs and Symptoms  Outcome: Progressing  Goal: Effective Tissue Perfusion  Outcome: Progressing  Goal: Optimal Pain Control and Function  Outcome: Progressing  Goal: Effective Oxygenation and Ventilation  Outcome: Progressing  Intervention: Promote Airway Secretion Clearance  Recent Flowsheet Documentation  Taken 2/14/2024 0200 by Spike Johnson RN  Activity Management: activity adjusted per tolerance  Intervention: Optimize Oxygenation and Ventilation  Recent Flowsheet Documentation  Taken 2/14/2024 0200 by Spike Johnson RN  Head of Bed (HOB) Positioning: HOB at 30-45 degrees  Taken 2/13/2024 2000 by Spike Johnson RN  Head of Bed (HOB) Positioning: HOB at 30-45 degrees   Goal Outcome Evaluation:         Date/Time: 2/13/24 @ [id number]    Trauma/Ortho/Medical (Choose one) Ortho    Diagnosis: ORIF R hip facture with intramedullary nail  POD#: 6  Mental Status: Alert to self  Activity/dangle Assist of 2 Karen stedy  Diet: Regular  Pain: Managed with PRN Tylenol  Du/Voiding: Bathroom  Tele/Restraints/Iso: N/A  02/LDA: On room air, PIV SL on the R forearm  D/C Date: Pending discharge to TCU  Other Info: Dressing has small dried drainage. CMS intact. On K, Mg, and phosphorus protocol. Bladder scanned @ 0538 hours, 308 ml of urine.

## 2024-02-14 NOTE — DISCHARGE SUMMARY
"[facility]    Discharge Summary  Hospitalist    Date of Admission:  2/7/2024  Date of Discharge:  2/14/2024  Discharging Provider: Peter Hurley MD  Date of Service (when I saw the patient): 02/14/24      History of Present Illness   Mildred Connelly is a 73 year old female admitted on 2/7/2024 with PMH  Alzheimer's Disease, for which she is followed at the [facility]. Presented by ambulance from her Memory Care Facility after being found down for an unknown period of time. Imaging revealed a right hip fracture.     Hospital Course   Mildred Connelly was admitted on 2/7/2024.  The following problems were addressed during her hospitalization:    Right hip intertrochanteric fracture s/p ORIF 2/8/24  Unwitnessed fall, suspected mechanical  Acute blood loss anemia secondary to fracture/surgery:   - Appears to be doing well post-op.  Post-op site cares, activity restrictions, pain medications, DVT proph per orthopedics.  Modest hgb decline with surgery, no indication for a current transfusion. Hgb on discharge 9.3.  - TCU on discharge  -See Ortho note 2/12/2024,  \"-Continue ASA 81 mg BID x 6 weeks for DVT prophylaxis.    -Mobilize with PT/OT.  -WBAT RLE with walker.    -Continue current pain regimen.   -Dressings: Keep current dressing in place. Okay for RN to change dressings if peeling or saturated >60%.  -Follow-up: 2 weeks post-op with Dr. Josiah Dick  - d/c to TCU, family supportive  and her encourage Patient with Therapies.        Fevers, resolved  hypotension, resolved  Noted to have fever 2/10/2024 recorded at 101.2  Continue with IS and up out of bed  -since then afebrile, no leukocytosis.  Hemoglobin stable.  No reported wound issues.  Continue monitor.  -UA/UC, mixed urogenital tapan.     Urinary retention -  post op, resolved  Du removed as per post-op instructions on 2/10/24  Straight cathed x  1 this am f or  700 ml  Continue to encourage up in chair and  up out of bed  Will " encourage po intake now that IVF have been stopped (she doesn't remember to eat/drink) and so will order assisted feedings as needed; family supportive and encourage.      Alzheimer's Disease:  Per daughter, patient's baseline mental status is alert to self only. She is at her baseline. Follow[facility] at [facility].  She answers basic questions well.  - Continue PTA seroquel, aricept, namenda, VPA   -Appears at baseline, family supportive, no acute behavioral issues.     Hyperlipidemia   - Stable       Code Status   Full Code       Primary Care Physician   Marie High    Physical Exam   Temp: 98.6  F (37  C) Temp src: Oral BP: 118/57 Pulse: 76   Resp: 16 SpO2: 100 % O2 Device: None (Room air)    Vitals:    02/07/24 0853   Weight: 72.6 kg (160 lb)     General/Constitutional:   NAD, more awake, calm, cooperative. Appears comfortable.   Chest/Respiratory: Respirations nonlabored room air  Cardiovascular:  regular, no murmur appreciated.  Gastrointestinal/Abdomen:  soft, nontender,   MSK.  Surgical wound dressed.  Distal digits intact.  Neuro.  Gross motor tested, nonfocal, uses EZ stand for ambulation, two-person assist.  Psych oriented x 1, affect calm    Discharge Disposition   Discharged to short-term care facility  Condition at discharge: Fair    Consultations This Hospital Stay   CARE MANAGEMENT / SOCIAL WORK IP CONSULT  PHYSICAL THERAPY ADULT IP CONSULT  OCCUPATIONAL THERAPY ADULT IP CONSULT  VASCULAR ACCESS ADULT IP CONSULT  PHYSICAL THERAPY ADULT IP CONSULT  OCCUPATIONAL THERAPY ADULT IP CONSULT    Time Spent on this Encounter   Peter CAMPBELL MD, personally saw the patient today and spent greater than 30 minutes discharging this patient discussing discharge plans with Patient, Daughter, Nursing and SW, coordinating discharge and transition to TCU.  Coordinating with Specialties Ortho regarding discharge instructions and follow-up appointments, completing discharge orders, medications and  follow-up.    Discharge Orders      Home Care Referral      General info for SNF    Length of Stay Estimate: Short Term Care: Estimated # of Days <30  Condition at Discharge: Stable  Level of care:skilled   Rehabilitation Potential: Fair  Admission H&P remains valid and up-to-date: Yes  Recent Chemotherapy: N/A  Use Nursing Home Standing Orders: Yes     Mantoux instructions    Give two-step Mantoux (PPD) Per Facility Policy Yes     Follow Up and recommended labs and tests    Follow-up with Dr. Dick ([facility]) at 2-3 weeks postop for reevaluation and wound check if applicable. No need for follow up labs or testing prior to this appointment.     Please contact Dr. Dick's care coordinator, Beatriz, at [phone] to schedule or for any questions related to your orthopedic injury/surgery.    [facility] [place] After Hours Phone: [phone]     Reason for your hospital stay    S/p right intertrochanteric femur fracture ORIF using CM device (DOS: 2/8/24) with Dr. Dick     Wound care    Site: Right hip  Instructions:  Please leave dressing intact for 2 weeks postoperatively. Okay to change if saturated >60% or peeling. If an Aquacel or gauze/tegaderm is in place over your surgical sites, it is okay to shower without covering the dressings. If you have a soft dressing, you must securely cover your dressing while showering or sponge bathe. Absolutely no soaking or submersion. Please contact your orthopedic surgical team if persistent drainage or redness develops around the surgical site.     Additional Discharge Instructions    Pain after surgery is normal and expected.  You will have some amount of pain and swelling for several weeks after surgery.  These symptoms will improve with time.  There are several things you can do to help reduce your pain including: rest, cold compresses, elevation, and using pain medications as needed. Contact your Surgeon Team if you have pain that persists or worsens  after surgery despite rest, ice, elevation, and taking your medication(s) as prescribed. Contact your Surgeon Team if you have new numbness, tingling, or weakness in your operative extremity.    Swelling and/or bruising of the surgical extremity is common and may persist for several months after surgery. In addition to frequent icing and elevation, gentle compressive support with an ACE wrap or tubigrip may help with swelling. Apply compression regularly, removing at least twice daily to perform skin checks. Contact your Surgeon Team if your swelling increases and is NOT associated with an increase in your activity level, or if your swelling increases and is associated with redness and pain.    Ice can be used to control swelling and discomfort after surgery. Place a thin towel over your operative site and apply the ice pack overtop. Leave ice pack in place for 20 minutes, then remove for 20 minutes. Repeat this 20 minutes on/20 minutes off routine as often as tolerated.    Please contact your surgical team with any concerns for infection including increasing redness around your surgical site, pus-like drainage, fever, chills, or flu-like symptoms.     Activity - Up with assistive device     Activity - Up with nursing assistance     Weight bearing status    WBAT RLE with walker     Physical Therapy Adult Consult    Evaluate and treat as clinically indicated.    Reason: S/p right intertrochanteric femur fracture ORIF using CM device (DOS: 2/8/24) with Dr. Dick     Occupational Therapy Adult Consult    Evaluate and treat as clinically indicated.    Reason: S/p right intertrochanteric femur fracture ORIF using CM device (DOS: 2/8/24) with Dr. Dick     Fall precautions     Fall precautions     Crutches DME    DME Documentation: Describe the reason for need to support medical necessity: Impaired gait status post hip surgery. I, the undersigned, certify that the above prescribed supplies are medically necessary for this  patient and is both reasonable and necessary in reference to accepted standards of medical practice in the treatment of this patient's condition and is not prescribed as a convenience.     Cane DME    DME Documentation: Describe the reason for need to support medical necessity: Impaired gait status post hip surgery. I, the undersigned, certify that the above prescribed supplies are medically necessary for this patient and is both reasonable and necessary in reference to accepted standards of medical practice in the treatment of this patient's condition and is not prescribed as a convenience.     Walker DME    DME Documentation: Describe the reason for need to support medical necessity: Impaired gait status post hip surgery. I, the undersigned, certify that the above prescribed supplies are medically necessary for this patient and is both reasonable and necessary in reference to accepted standards of medical practice in the treatment of this patient's condition and is not prescribed as a convenience.     Diet    Follow this diet upon discharge:       Advance Diet as Tolerated: Regular Diet Adult     Discharge Medications   Current Discharge Medication List        START taking these medications    Details   aspirin 81 MG EC tablet Take 1 tablet (81 mg) by mouth 2 times daily    Associated Diagnoses: Hip fracture, right, closed, initial encounter (H)      hydrOXYzine HCl (ATARAX) 10 MG tablet Take 1 tablet (10 mg) by mouth every 4 hours as needed for itching    Associated Diagnoses: Hip fracture, right, closed, initial encounter (H)      oxyCODONE (ROXICODONE) 5 MG tablet Take 0.5-1 tablets (2.5-5 mg) by mouth every 4 hours as needed for moderate to severe pain (Take 2.5 mg (1/2 tab) for pain 4-6/10, take 5 mg (1 tab) for pain 7-10/10)  Qty: 20 tablet, Refills: 0    Associated Diagnoses: Hip fracture, right, closed, initial encounter (H)      polyethylene glycol (MIRALAX) 17 GM/Dose powder Take 17 g by mouth daily     Associated Diagnoses: Hip fracture, right, closed, initial encounter (H)      senna-docusate (SENOKOT-S/PERICOLACE) 8.6-50 MG tablet Take 1 tablet by mouth 2 times daily as needed for constipation    Associated Diagnoses: Hip fracture, right, closed, initial encounter (H)      Vitamin D3 (CHOLECALCIFEROL) 25 mcg (1000 units) tablet Take 2 tablets (50 mcg) by mouth daily    Associated Diagnoses: Hip fracture, right, closed, initial encounter (H)           CONTINUE these medications which have CHANGED    Details   acetaminophen (TYLENOL) 325 MG tablet Take 2 tablets (650 mg) by mouth every 4 hours as needed for mild pain or other (and adjunct with moderate or severe pain or per patient request)    Associated Diagnoses: Hip fracture, right, closed, initial encounter (H)      QUEtiapine (SEROQUEL) 25 MG tablet Take 2 tablets (50 mg) by mouth 2 times daily. May also take 1 tablet (25 mg) 2 times daily as needed.    Associated Diagnoses: Agitation           CONTINUE these medications which have NOT CHANGED    Details   divalproex sodium delayed-release (DEPAKOTE) 125 MG DR tablet Take 125 mg by mouth 2 times daily      donepezil (ARICEPT) 5 MG tablet Take 5 mg by mouth every morning      escitalopram (LEXAPRO) 20 MG tablet Take 20 mg by mouth daily      memantine (NAMENDA) 10 MG tablet Take 10 mg by mouth 2 times daily      multivitamin (CENTRUM SILVER) tablet Take 1 tablet by mouth daily           Allergies   No Known Allergies  Data   Most Recent 3 CBC's:  Recent Labs   Lab Test 02/13/24  0712 02/12/24  0707 02/10/24  0743 02/09/24  0801 02/07/24  0901   WBC 7.7 9.2  --   --  10.4   HGB 9.3* 9.9* 9.3*   < > 12.8   MCV 97 97  --   --  98    208  --   --  186    < > = values in this interval not displayed.      Most Recent 3 BMP's:  Recent Labs   Lab Test 02/14/24  0649 02/13/24  0712 02/12/24  0707 02/11/24  0353 02/10/24  0743 02/10/24  0541 02/09/24  2207 02/09/24  0801 02/08/24  0628 02/07/24  0901   NA  --    --  140  --   --   --   --  142  --  143   POTASSIUM 3.6 3.6 3.5   < > 3.5  --   --  3.9   < > 3.8   CHLORIDE  --   --  103  --   --   --   --  104  --  104   CO2  --   --  27  --   --   --   --  28  --  30*   BUN  --   --  16.8  --   --   --   --  16.3  --  22.4   CR  --   --  0.64  --   --   --   --  0.60  --  0.77   ANIONGAP  --   --  10  --   --   --   --  10  --  9   EV  --   --  8.8  --   --   --   --  8.6*  --  8.9   GLC  --   --  94  --  88 93   < > 115*  --  123*    < > = values in this interval not displayed.

## 2024-02-14 NOTE — PROGRESS NOTES
Assisted pt. With discharge to TCU. Pt ready for discharge and waiting for ride at the time of report.

## 2024-02-15 NOTE — PLAN OF CARE
Physical Therapy Discharge Summary    Reason for therapy discharge:    Discharged to transitional care facility.    Progress towards therapy goal(s). See goals on Care Plan in [facility] electronic health record for goal details.  Goals partially met.  Barriers to achieving goals:   discharge from facility.    Therapy recommendation(s):    Continued therapy is recommended.  Rationale/Recommendations:  Pt below baseline mobility. Walking minimal distance with PT, difficulty with motor planning and cue retention. Patient has dementia at baseline and requires frequent orienting. Patient performs supine>sit today with SBA. Will benefit from further skilled PT to progress functional mobility.

## 2024-02-17 ENCOUNTER — MYC MEDICAL ADVICE (OUTPATIENT)
Dept: GERIATRICS | Facility: CLINIC | Age: 74
End: 2024-02-17
Payer: MEDICARE

## 2024-03-12 VITALS
TEMPERATURE: 97.7 F | RESPIRATION RATE: 18 BRPM | HEART RATE: 80 BPM | WEIGHT: 168.4 LBS | OXYGEN SATURATION: 95 % | HEIGHT: 67 IN | BODY MASS INDEX: 26.43 KG/M2 | DIASTOLIC BLOOD PRESSURE: 68 MMHG | SYSTOLIC BLOOD PRESSURE: 105 MMHG

## 2024-03-12 NOTE — PROGRESS NOTES
[facility] GERIATRICS    PRIMARY CARE PROVIDER AND CLINIC:  ERNST Valenzuela CNP, [address] [facility] [address]  Chief Complaint   Patient presents with    Hospital F/U      [place] Medical Record Number:  [id number]  Place of Service where encounter took place:  [facility]) [[id number]]    Mildred Connelly  is a 73 year old  (1950), returned to the above facility from  [facility]. Hospital stay 2/7/24 - 2/14/24.        HPI:      Admitted to [facility] 2/7-2/14/24 due to fall with R hip fracture. S/p ORIF 2/8/24 with Dr. Dick. Started on ASA 81mg BID x 6 weeks for DVT prophylaxis. WBAT to RLE. Developed postop fever 101.2F, UC negative and since has been afebrile. Also developed postop urinary retention, arizmenid removed on 2/10/24. Required straight cath for PVR 700cc on 2/14/24.    Discharged to [facility] TCU x 2 weeks, limited records available.    Returned to [facility] memory care unit on 3/5/24.      Today's concerns:    RN reports concern regarding wound dehiscence to R hip incision, reported mild drainage last week, which now has resolved. Patient has Ortho appointment today at 2PM. Staff reports since patient has been back at [facility], no reports of behaviors/agitation and sleeping well at night.     During exam, patient seen sitting in wheelchair. HPI limited due to dementia. Reports doing well, denies pain or concerns today. Admits to good appetite. Sleeping well at night. Denies constipation, diarrhea. Denies chest pain, SOB, headache, syncope.      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  FAMILY HISTORY: family history includes Atrial fibrillation in her father; Breast Cancer in her sister; Cancer in her brother; Cerebrovascular Disease in her brother, father, and maternal grandmother; Diabetes in her brother; Heart Disease in her brother; Liver Disease in her brother; Lymphoma in her mother; Osteoarthritis in her father; Stomach Cancer in her maternal grandmother.  SOCIAL HISTORY:   reports that she quit smoking about 49 years ago. Her smoking use included cigarettes. She has never used smokeless tobacco. She reports that she does not currently use alcohol. She reports that she does not use drugs.  Patient's living condition: lives in an assisted living facility      Post Discharge Medication Reconciliation Status:   MED REC REQUIRED  Post Medication Reconciliation Status: discharge medications reconciled and changed, per note/orders     Current Outpatient Medications   Medication Sig    acetaminophen (TYLENOL) 500 MG tablet Take 2 tablets (1,000 mg) by mouth 2 times daily. May also take 2 tablets (1,000 mg) daily as needed for mild pain.    aspirin 81 MG EC tablet Take 1 tablet (81 mg) by mouth daily for 9 days    divalproex sodium delayed-release (DEPAKOTE) 125 MG DR tablet Take 125 mg by mouth 2 times daily    donepezil (ARICEPT) 5 MG tablet Take 5 mg by mouth every morning    escitalopram (LEXAPRO) 20 MG tablet Take 20 mg by mouth daily    memantine (NAMENDA) 10 MG tablet Take 10 mg by mouth 2 times daily    multivitamin (CENTRUM SILVER) tablet Take 1 tablet by mouth daily    polyethylene glycol (MIRALAX) 17 GM/Dose powder Take 17 g (1  "Capful) by mouth daily as needed for constipation    QUEtiapine (SEROQUEL) 50 MG tablet Take 1 tablet (50 mg) by mouth 2 times daily    senna-docusate (SENOKOT-S/PERICOLACE) 8.6-50 MG tablet Take 1 tablet by mouth 2 times daily as needed for constipation    Vitamin D3 (CHOLECALCIFEROL) 25 mcg (1000 units) tablet Take 2 tablets (50 mcg) by mouth daily     No current facility-administered medications for this visit.       ROS:  Limited secondary to cognitive impairment but today pt reports no concerns      Vitals:  /68   Pulse 80   Temp 97.7  F (36.5  C)   Resp 18   Ht 1.702 m (5' 7\")   Wt 76.4 kg (168 lb 6.4 oz)   SpO2 95%   BMI 26.38 kg/m    Exam:  GENERAL APPEARANCE:  Alert, in no distress, appears healthy, cooperative  ENT:  Mouth and posterior oropharynx normal, moist mucous membranes, [place]  EYES:  EOM, conjunctivae, lids, pupils and irises normal, PERRL  RESP:  respiratory effort and palpation of chest normal, lungs clear to auscultation , no respiratory distress  CV:  regular rate and rhythm, no murmur, rub, or gallop, no edema  ABDOMEN:  normal bowel sounds, soft, nontender, no guarding or rebound  M/S:   Gait and station abnormal, sitting in wheelchair.  SKIN:  R hip incision SATHYA (see photo).   NEURO:   Cranial nerves 2-12 are normal tested and grossly at patient's baseline, Examination of sensation by touch normal  PSYCH:  oriented to self, memory impaired , affect and mood normal    Wt Readings from Last 4 Encounters:   03/12/24 76.4 kg (168 lb 6.4 oz)   02/07/24 72.6 kg (160 lb)   01/30/24 78.7 kg (173 lb 9.6 oz)   01/05/24 76.7 kg (169 lb)       Photo 3/13/24:  R hip incision            Lab/Diagnostic data:  Recent labs in [facility] reviewed by me today.   Most Recent 3 CBC's:  Recent Labs   Lab Test 02/13/24  0712 02/12/24  0707 02/10/24  0743 02/09/24  0801 02/07/24  0901   WBC 7.7 9.2  --   --  10.4   HGB 9.3* 9.9* 9.3*   < > 12.8   MCV 97 97  --   --  98    208  --   --  186    < > = " values in this interval not displayed.     Most Recent 3 BMP's:  Recent Labs   Lab Test 02/14/24  0649 02/13/24  0712 02/12/24  0707 02/11/24  0353 02/10/24  0743 02/10/24  0541 02/09/24  2207 02/09/24  0801 02/08/24  0628 02/07/24  0901   NA  --   --  140  --   --   --   --  142  --  143   POTASSIUM 3.6 3.6 3.5   < > 3.5  --   --  3.9   < > 3.8   CHLORIDE  --   --  103  --   --   --   --  104  --  104   CO2  --   --  27  --   --   --   --  28  --  30*   BUN  --   --  16.8  --   --   --   --  16.3  --  22.4   CR  --   --  0.64  --   --   --   --  0.60  --  0.77   ANIONGAP  --   --  10  --   --   --   --  10  --  9   EV  --   --  8.8  --   --   --   --  8.6*  --  8.9   GLC  --   --  94  --  88 93   < > 115*  --  123*    < > = values in this interval not displayed.         ASSESSMENT/PLAN:    (S72.001D) Closed fracture of right hip with routine healing, subsequent encounter  (primary encounter diagnosis)  (Z98.890,  Z87.81) S/P ORIF (open reduction internal fixation) fracture  (D64.9) Postoperative anemia  Comment: R hip fracture r/t fall on 2/7/24, s/p ORIF on 2/8/24. Postop anemia. Pain controlled.  Plan:   - Home care PT, OT following  - Change miralax to 17g po every day PRN dx constipation  - Course of aspirin to be completed on 3/22/24 dx DVT prophylaxis  - Check BMP, CBC on 3/20/24 dx postop anemia, dementia  - Discontinue hydroxyzine PRN   - Decrease tylenol 1000mg BID  dx pain  - Add tylenol 1000mg every day PRN  dx pain  - Patient to follow-up with Dr. Dick (Ortho) today     (G30.0,  F02.C11) 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 SLUMS 3/30 (11/2023).  Plan:   - Continue seroquel 50mg BID; consider gradual dose reduction  - Continue depakote, melatonin  - Continue lexapro 20mg every day  - Continue aricept, namenda  - Monitor  changes in mood or behaviors  - Continue supportive services at [facility] memory care unit  - Patient to follow-up with Neurology as directed  - Left voicemail with Dayana (daughter)   UPDATE: Reviewed patient status and treatment plan with Dayana. Discussed Ortho appointment went well, healing well without concerns and applied clear steri-strips to remain in place until fall off. Also discussed Neurology recommendations for gradual dose reduction of seroquel. Plan to reassess behaviors after one month to re-evaluate.      Orders:  - Change miralax to 17g po every day PRN dx constipation  - Course of aspirin to be completed on 3/22/24 dx DVT prophylaxis  - Check BMP, CBC on 3/20/24 dx postop anemia, dementia  - Discontinue hydroxyzine PRN   - Decrease tylenol 1000mg BID  dx pain  - Add tylenol 1000mg every day PRN  dx pain        Electronically signed by:  ERNST Valenzuela CNP

## 2024-03-13 ENCOUNTER — ASSISTED LIVING VISIT (OUTPATIENT)
Dept: GERIATRICS | Facility: CLINIC | Age: 74
End: 2024-03-13
Payer: MEDICARE

## 2024-03-13 DIAGNOSIS — D64.9 POSTOPERATIVE ANEMIA: ICD-10-CM

## 2024-03-13 DIAGNOSIS — S72.001D CLOSED FRACTURE OF RIGHT HIP WITH ROUTINE HEALING, SUBSEQUENT ENCOUNTER: Primary | ICD-10-CM

## 2024-03-13 DIAGNOSIS — Z98.890 S/P ORIF (OPEN REDUCTION INTERNAL FIXATION) FRACTURE: ICD-10-CM

## 2024-03-13 DIAGNOSIS — G30.9 MAJOR NEUROCOGNITIVE DISORDER DUE TO ALZHEIMER'S DISEASE (H): ICD-10-CM

## 2024-03-13 DIAGNOSIS — F02.80 MAJOR NEUROCOGNITIVE DISORDER DUE TO ALZHEIMER'S DISEASE (H): ICD-10-CM

## 2024-03-13 DIAGNOSIS — F02.C11 SEVERE EARLY ONSET ALZHEIMER'S DEMENTIA WITH AGITATION (H): ICD-10-CM

## 2024-03-13 DIAGNOSIS — G30.0 SEVERE EARLY ONSET ALZHEIMER'S DEMENTIA WITH AGITATION (H): ICD-10-CM

## 2024-03-13 DIAGNOSIS — Z87.81 S/P ORIF (OPEN REDUCTION INTERNAL FIXATION) FRACTURE: ICD-10-CM

## 2024-03-13 PROBLEM — F02.B11: Status: ACTIVE | Noted: 2024-03-13

## 2024-03-13 PROBLEM — F39 UNSPECIFIED MOOD (AFFECTIVE) DISORDER (H): Status: ACTIVE | Noted: 2024-03-13

## 2024-03-13 PROCEDURE — 99349 HOME/RES VST EST MOD MDM 40: CPT | Performed by: NURSE PRACTITIONER

## 2024-03-13 NOTE — LETTER
3/13/2024        RE: Mildred Connelly  C/o Gaye Faulstick  [address] [facility] [address]        [facility] GERIATRICS    PRIMARY CARE PROVIDER AND CLINIC:  ERNST Valenzuela [facility], [address] [facility] [address]  Chief Complaint   Patient presents with     Hospital F/U      [employer] Medical Record Number:  [id number]  Place of Service where encounter took place:  [facility])(FGS) [[id number]]    Mildred Connelly  is a 73 year old  (1950), returned to the above facility from  [facility]. Hospital stay 2/7/24 - 2/14/24.        HPI:      Admitted to [facility] 2/7-2/14/24 due to fall with R hip fracture. S/p ORIF 2/8/24 with Dr. Dick. Started on ASA 81mg BID x 6 weeks for DVT prophylaxis. WBAT to E. Developed postop fever 101.2F, UC negative and since has been afebrile. Also developed postop urinary retention, arizmendi removed on 2/10/24. Required straight cath for PVR 700cc on 2/14/24.    Discharged to [facility] TCU x 2 weeks, limited records available.    Returned to [facility] memory care unit on 3/5/24.      Today's concerns:    RN reports concern regarding wound dehiscence to R hip incision, reported mild drainage last week, which now has resolved. Patient has Ortho appointment today at 2PM. Staff reports since patient has been back at [facility], no reports of behaviors/agitation and sleeping well at night.     During exam, patient seen sitting in wheelchair. HPI limited due to dementia. Reports doing well, denies pain or concerns today. Admits to good appetite. Sleeping well at night. Denies constipation, diarrhea. Denies chest pain, SOB, headache, syncope.      CODE STATUS/ADVANCE DIRECTIVES DISCUSSION:  CPR/Full code   ALLERGIES: No Known Allergies   PAST MEDICAL HISTORY:   Past Medical History:   Diagnosis Date     Basal cell carcinoma (BCC) of face 07/06/2011     Cognitive disorder 08/06/2019     Endocervical polyp 02/04/2020     Mild  neurocognitive disorder due to Alzheimer's disease (H) 08/06/2019     Minimal cognitive impairment 10/04/2019     Mixed hyperlipidemia 07/30/2010     Onychomycosis 04/14/2009     Pneumonia 2014     Rosacea 02/04/2020     Skin cancer      Tibia/fibula fracture 2005      PAST SURGICAL HISTORY:   has a past surgical history that includes orthopedic surgery; Endometrial Ablation (2004); Mohs micrographic procedure (2011); and Open reduction internal fixation hip nailing (Right, 2/8/2024).  FAMILY HISTORY: family history includes Atrial fibrillation in her father; Breast Cancer in her sister; Cancer in her brother; Cerebrovascular Disease in her brother, father, and maternal grandmother; Diabetes in her brother; Heart Disease in her brother; Liver Disease in her brother; Lymphoma in her mother; Osteoarthritis in her father; Stomach Cancer in her maternal grandmother.  SOCIAL HISTORY:   reports that she quit smoking about 49 years ago. Her smoking use included cigarettes. She has never used smokeless tobacco. She reports that she does not currently use alcohol. She reports that she does not use drugs.  Patient's living condition: lives in an assisted living facility      Post Discharge Medication Reconciliation Status:   MED REC REQUIRED  Post Medication Reconciliation Status: discharge medications reconciled and changed, per note/orders     Current Outpatient Medications   Medication Sig     acetaminophen (TYLENOL) 500 MG tablet Take 2 tablets (1,000 mg) by mouth 2 times daily. May also take 2 tablets (1,000 mg) daily as needed for mild pain.     aspirin 81 MG EC tablet Take 1 tablet (81 mg) by mouth daily for 9 days     divalproex sodium delayed-release (DEPAKOTE) 125 MG DR tablet Take 125 mg by mouth 2 times daily     donepezil (ARICEPT) 5 MG tablet Take 5 mg by mouth every morning     escitalopram (LEXAPRO) 20 MG tablet Take 20 mg by mouth daily     memantine (NAMENDA) 10 MG tablet Take 10 mg by mouth 2 times daily  "    multivitamin (CENTRUM SILVER) tablet Take 1 tablet by mouth daily     polyethylene glycol (MIRALAX) 17 GM/Dose powder Take 17 g (1 Capful) by mouth daily as needed for constipation     QUEtiapine (SEROQUEL) 50 MG tablet Take 1 tablet (50 mg) by mouth 2 times daily     senna-docusate (SENOKOT-S/PERICOLACE) 8.6-50 MG tablet Take 1 tablet by mouth 2 times daily as needed for constipation     Vitamin D3 (CHOLECALCIFEROL) 25 mcg (1000 units) tablet Take 2 tablets (50 mcg) by mouth daily     No current facility-administered medications for this visit.       ROS:  Limited secondary to cognitive impairment but today pt reports no concerns      Vitals:  /68   Pulse 80   Temp 97.7  F (36.5  C)   Resp 18   Ht 1.702 m (5' 7\")   Wt 76.4 kg (168 lb 6.4 oz)   SpO2 95%   BMI 26.38 kg/m    Exam:  GENERAL APPEARANCE:  Alert, in no distress, appears healthy, cooperative  ENT:  Mouth and posterior oropharynx normal, moist mucous membranes, [place]  EYES:  EOM, conjunctivae, lids, pupils and irises normal, PERRL  RESP:  respiratory effort and palpation of chest normal, lungs clear to auscultation , no respiratory distress  CV:  regular rate and rhythm, no murmur, rub, or gallop, no edema  ABDOMEN:  normal bowel sounds, soft, nontender, no guarding or rebound  M/S:   Gait and station abnormal, sitting in wheelchair.  SKIN:  R hip incision SATHYA (see photo).   NEURO:   Cranial nerves 2-12 are normal tested and grossly at patient's baseline, Examination of sensation by touch normal  PSYCH:  oriented to self, memory impaired , affect and mood normal    Wt Readings from Last 4 Encounters:   03/12/24 76.4 kg (168 lb 6.4 oz)   02/07/24 72.6 kg (160 lb)   01/30/24 78.7 kg (173 lb 9.6 oz)   01/05/24 76.7 kg (169 lb)       Photo 3/13/24:  R hip incision            Lab/Diagnostic data:  [facility] reviewed by me today.   Most Recent 3 CBC's:  Recent Labs   Lab Test 02/13/24  0712 02/12/24  0707 02/10/24  0743 02/09/24  0801 " 02/07/24  0901   WBC 7.7 9.2  --   --  10.4   HGB 9.3* 9.9* 9.3*   < > 12.8   MCV 97 97  --   --  98    208  --   --  186    < > = values in this interval not displayed.     Most Recent 3 BMP's:  Recent Labs   Lab Test 02/14/24  0649 02/13/24  0712 02/12/24  0707 02/11/24  0353 02/10/24  0743 02/10/24  0541 02/09/24  2207 02/09/24  0801 02/08/24  0628 02/07/24  0901   NA  --   --  140  --   --   --   --  142  --  143   POTASSIUM 3.6 3.6 3.5   < > 3.5  --   --  3.9   < > 3.8   CHLORIDE  --   --  103  --   --   --   --  104  --  104   CO2  --   --  27  --   --   --   --  28  --  30*   BUN  --   --  16.8  --   --   --   --  16.3  --  22.4   CR  --   --  0.64  --   --   --   --  0.60  --  0.77   ANIONGAP  --   --  10  --   --   --   --  10  --  9   EV  --   --  8.8  --   --   --   --  8.6*  --  8.9   GLC  --   --  94  --  88 93   < > 115*  --  123*    < > = values in this interval not displayed.         ASSESSMENT/PLAN:    (S72.001D) Closed fracture of right hip with routine healing, subsequent encounter  (primary encounter diagnosis)  (Z98.890,  Z87.81) S/P ORIF (open reduction internal fixation) fracture  (D64.9) Postoperative anemia  Comment: R hip fracture r/t fall on 2/7/24, s/p ORIF on 2/8/24. Postop anemia. Pain controlled.  Plan:   - Home care PT, OT following  - Change miralax to 17g po every day PRN dx constipation  - Course of aspirin to be completed on 3/22/24 dx DVT prophylaxis  - Check BMP, CBC on 3/20/24 dx postop anemia, dementia  - Discontinue hydroxyzine PRN   - Decrease tylenol 1000mg BID  dx pain  - Add tylenol 1000mg every day PRN  dx pain  - Patient to follow-up with Dr. Dick (Ortho) today     (G30.0,  F02.C11) 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 SLUMS 3/30 (11/2023).  Plan:   - Continue seroquel 50mg BID;  consider gradual dose reduction  - Continue depakote, melatonin  - Continue lexapro 20mg every day  - Continue aricept, namenda  - Monitor changes in mood or behaviors  - Continue supportive services at [facility] memory care unit  - Patient to follow-up with Neurology as directed  - Left voicemail with Dayana (daughter)   UPDATE: Reviewed patient status and treatment plan with Dayana. Discussed Ortho appointment went well, healing well without concerns and applied clear steri-strips to remain in place until fall off. Also discussed Neurology recommendations for gradual dose reduction of seroquel. Plan to reassess behaviors after one month to re-evaluate.      Orders:  - Change miralax to 17g po every day PRN dx constipation  - Course of aspirin to be completed on 3/22/24 dx DVT prophylaxis  - Check BMP, CBC on 3/20/24 dx postop anemia, dementia  - Discontinue hydroxyzine PRN   - Decrease tylenol 1000mg BID  dx pain  - Add tylenol 1000mg every day PRN  dx pain        Electronically signed by:  ERNST Valenzuela CNP                        [facility]   2024     Name: Mildred YOUNG Yineal   : 1950       Orders:  - Change miralax to 17g po every day PRN dx constipation  - Course of aspirin to be completed on 3/22/24 dx DVT prophylaxis  - Check BMP, CBC on 3/20/24 dx postop anemia, dementia  - Discontinue hydroxyzine PRN   - Decrease tylenol 1000mg BID  dx pain  - Add tylenol 1000mg every day PRN  dx pain        Electronically signed by   ERNST Valenzuela CNP on 3/14/2024 at 3:32 PM          Sincerely,        ERNST Valenzuela CNP

## 2024-03-14 RX ORDER — POLYETHYLENE GLYCOL 3350 17 G/17G
1 POWDER, FOR SOLUTION ORAL DAILY PRN
Qty: 225 G | Refills: 11 | Status: SHIPPED | OUTPATIENT
Start: 2024-03-14

## 2024-03-14 RX ORDER — QUETIAPINE FUMARATE 50 MG/1
50 TABLET, FILM COATED ORAL 2 TIMES DAILY
Qty: 60 TABLET | Refills: 11 | Status: SHIPPED | OUTPATIENT
Start: 2024-03-14 | End: 2024-10-04

## 2024-03-14 RX ORDER — ACETAMINOPHEN 500 MG
1000 TABLET ORAL 3 TIMES DAILY
Qty: 90 TABLET | Refills: 11 | Status: SHIPPED | OUTPATIENT
Start: 2024-03-14 | End: 2024-03-14 | Stop reason: DRUGHIGH

## 2024-03-14 RX ORDER — ASPIRIN 81 MG/1
81 TABLET ORAL DAILY
Qty: 9 TABLET | Refills: 0 | Status: SHIPPED | OUTPATIENT
Start: 2024-03-14 | End: 2024-03-23

## 2024-03-14 RX ORDER — ACETAMINOPHEN 500 MG
TABLET ORAL
Qty: 90 TABLET | Refills: 11 | Status: SHIPPED | OUTPATIENT
Start: 2024-03-14

## 2024-03-14 NOTE — PROGRESS NOTES
[facility] Geriatrics   2024     Name: Mildred Connelly   : 1950       Orders:  - Change miralax to 17g po every day PRN dx constipation  - Course of aspirin to be completed on 3/22/24 dx DVT prophylaxis  - Check BMP, CBC on 3/20/24 dx postop anemia, dementia  - Discontinue hydroxyzine PRN   - Decrease tylenol 1000mg BID  dx pain  - Add tylenol 1000mg every day PRN  dx pain        Electronically signed by   ERNST Valenzuela CNP on 3/14/2024 at 3:32 PM

## 2024-03-19 ENCOUNTER — LAB REQUISITION (OUTPATIENT)
Dept: LAB | Facility: CLINIC | Age: 74
End: 2024-03-19
Payer: MEDICARE

## 2024-03-19 DIAGNOSIS — D62 ACUTE POSTHEMORRHAGIC ANEMIA: ICD-10-CM

## 2024-03-19 DIAGNOSIS — F03.90 UNSPECIFIED DEMENTIA, UNSPECIFIED SEVERITY, WITHOUT BEHAVIORAL DISTURBANCE, PSYCHOTIC DISTURBANCE, MOOD DISTURBANCE, AND ANXIETY (H): ICD-10-CM

## 2024-03-20 LAB
ERYTHROCYTE [DISTWIDTH] IN BLOOD BY AUTOMATED COUNT: 13.3 % (ref 10–15)
HCT VFR BLD AUTO: 45.7 % (ref 35–47)
HGB BLD-MCNC: 13.8 G/DL (ref 11.7–15.7)
MCH RBC QN AUTO: 30.5 PG (ref 26.5–33)
MCHC RBC AUTO-ENTMCNC: 30.2 G/DL (ref 31.5–36.5)
MCV RBC AUTO: 101 FL (ref 78–100)
PLATELET # BLD AUTO: 219 10E3/UL (ref 150–450)
RBC # BLD AUTO: 4.53 10E6/UL (ref 3.8–5.2)
WBC # BLD AUTO: 7.9 10E3/UL (ref 4–11)

## 2024-03-20 PROCEDURE — 36415 COLL VENOUS BLD VENIPUNCTURE: CPT | Mod: ORL | Performed by: NURSE PRACTITIONER

## 2024-03-20 PROCEDURE — 80048 BASIC METABOLIC PNL TOTAL CA: CPT | Mod: ORL | Performed by: NURSE PRACTITIONER

## 2024-03-20 PROCEDURE — P9603 ONE-WAY ALLOW PRORATED MILES: HCPCS | Mod: ORL | Performed by: NURSE PRACTITIONER

## 2024-03-20 PROCEDURE — 85027 COMPLETE CBC AUTOMATED: CPT | Mod: ORL | Performed by: NURSE PRACTITIONER

## 2024-03-21 ENCOUNTER — DOCUMENTATION ONLY (OUTPATIENT)
Dept: GERIATRICS | Facility: CLINIC | Age: 74
End: 2024-03-21
Payer: MEDICARE

## 2024-03-21 LAB
ANION GAP SERPL CALCULATED.3IONS-SCNC: 17 MMOL/L (ref 7–15)
BUN SERPL-MCNC: 19.3 MG/DL (ref 8–23)
CALCIUM SERPL-MCNC: 9.4 MG/DL (ref 8.8–10.2)
CHLORIDE SERPL-SCNC: 102 MMOL/L (ref 98–107)
CREAT SERPL-MCNC: 0.61 MG/DL (ref 0.51–0.95)
DEPRECATED HCO3 PLAS-SCNC: 21 MMOL/L (ref 22–29)
EGFRCR SERPLBLD CKD-EPI 2021: >90 ML/MIN/1.73M2
GLUCOSE SERPL-MCNC: 88 MG/DL (ref 70–99)
POTASSIUM SERPL-SCNC: 4.8 MMOL/L (ref 3.4–5.3)
SODIUM SERPL-SCNC: 140 MMOL/L (ref 135–145)

## 2024-03-21 NOTE — PROGRESS NOTES
[facility] Geriatrics   2024     Name: Mildred Connelly   : 1950       Orders:  - Check CMP, TSH, free T4, vitamin D level on 4/10/24 dx dementia, fatigue, hx right hip fracture         Electronically signed by   ERNST Valenzuela CNP on 3/21/2024 at 8:33 AM

## 2024-03-24 ENCOUNTER — HEALTH MAINTENANCE LETTER (OUTPATIENT)
Age: 74
End: 2024-03-24

## 2024-04-02 DIAGNOSIS — E55.9 VITAMIN D DEFICIENCY: Primary | ICD-10-CM

## 2024-04-02 DIAGNOSIS — G47.00 INSOMNIA: ICD-10-CM

## 2024-04-03 RX ORDER — LANOLIN ALCOHOL/MO/W.PET/CERES
3 CREAM (GRAM) TOPICAL AT BEDTIME
Qty: 90 TABLET | Refills: 97 | Status: SHIPPED | OUTPATIENT
Start: 2024-04-03

## 2024-04-03 RX ORDER — CHOLECALCIFEROL (VITAMIN D3) 50 MCG
TABLET ORAL
Qty: 90 TABLET | Refills: 97 | Status: SHIPPED | OUTPATIENT
Start: 2024-04-03 | End: 2024-07-09 | Stop reason: DRUGHIGH

## 2024-04-09 ENCOUNTER — LAB REQUISITION (OUTPATIENT)
Dept: LAB | Facility: CLINIC | Age: 74
End: 2024-04-09
Payer: MEDICARE

## 2024-04-09 DIAGNOSIS — R53.83 OTHER FATIGUE: ICD-10-CM

## 2024-04-09 DIAGNOSIS — E55.9 VITAMIN D DEFICIENCY, UNSPECIFIED: ICD-10-CM

## 2024-04-10 LAB
ALBUMIN SERPL BCG-MCNC: 4.4 G/DL (ref 3.5–5.2)
ALP SERPL-CCNC: 97 U/L (ref 40–150)
ALT SERPL W P-5'-P-CCNC: 20 U/L (ref 0–50)
ANION GAP SERPL CALCULATED.3IONS-SCNC: 11 MMOL/L (ref 7–15)
AST SERPL W P-5'-P-CCNC: 16 U/L (ref 0–45)
BILIRUB SERPL-MCNC: 0.6 MG/DL
BUN SERPL-MCNC: 20.3 MG/DL (ref 8–23)
CALCIUM SERPL-MCNC: 9.7 MG/DL (ref 8.8–10.2)
CHLORIDE SERPL-SCNC: 102 MMOL/L (ref 98–107)
CREAT SERPL-MCNC: 0.73 MG/DL (ref 0.51–0.95)
DEPRECATED HCO3 PLAS-SCNC: 29 MMOL/L (ref 22–29)
EGFRCR SERPLBLD CKD-EPI 2021: 87 ML/MIN/1.73M2
GLUCOSE SERPL-MCNC: 80 MG/DL (ref 70–99)
POTASSIUM SERPL-SCNC: 4 MMOL/L (ref 3.4–5.3)
PROT SERPL-MCNC: 7.5 G/DL (ref 6.4–8.3)
SODIUM SERPL-SCNC: 142 MMOL/L (ref 135–145)
T4 FREE SERPL-MCNC: 1.19 NG/DL (ref 0.9–1.7)
TSH SERPL DL<=0.005 MIU/L-ACNC: 1.64 UIU/ML (ref 0.3–4.2)
VIT D+METAB SERPL-MCNC: 49 NG/ML (ref 20–50)

## 2024-04-10 PROCEDURE — 84443 ASSAY THYROID STIM HORMONE: CPT | Mod: ORL | Performed by: NURSE PRACTITIONER

## 2024-04-10 PROCEDURE — 82306 VITAMIN D 25 HYDROXY: CPT | Mod: ORL | Performed by: NURSE PRACTITIONER

## 2024-04-10 PROCEDURE — P9603 ONE-WAY ALLOW PRORATED MILES: HCPCS | Mod: ORL | Performed by: NURSE PRACTITIONER

## 2024-04-10 PROCEDURE — 84439 ASSAY OF FREE THYROXINE: CPT | Mod: ORL | Performed by: NURSE PRACTITIONER

## 2024-04-10 PROCEDURE — 80053 COMPREHEN METABOLIC PANEL: CPT | Mod: ORL | Performed by: NURSE PRACTITIONER

## 2024-04-10 PROCEDURE — 36415 COLL VENOUS BLD VENIPUNCTURE: CPT | Mod: ORL | Performed by: NURSE PRACTITIONER

## 2024-04-15 DIAGNOSIS — Z53.9 DIAGNOSIS NOT YET DEFINED: Primary | ICD-10-CM

## 2024-04-15 PROCEDURE — G0180 MD CERTIFICATION HHA PATIENT: HCPCS | Performed by: NURSE PRACTITIONER

## 2024-05-15 ENCOUNTER — ASSISTED LIVING VISIT (OUTPATIENT)
Dept: GERIATRICS | Facility: CLINIC | Age: 74
End: 2024-05-15
Payer: MEDICARE

## 2024-05-15 VITALS
SYSTOLIC BLOOD PRESSURE: 109 MMHG | TEMPERATURE: 97.8 F | RESPIRATION RATE: 20 BRPM | WEIGHT: 158 LBS | DIASTOLIC BLOOD PRESSURE: 77 MMHG | HEIGHT: 67 IN | HEART RATE: 73 BPM | OXYGEN SATURATION: 94 % | BODY MASS INDEX: 24.8 KG/M2

## 2024-05-15 DIAGNOSIS — S72.001D CLOSED FRACTURE OF RIGHT HIP WITH ROUTINE HEALING, SUBSEQUENT ENCOUNTER: Primary | ICD-10-CM

## 2024-05-15 DIAGNOSIS — D64.9 POSTOPERATIVE ANEMIA: ICD-10-CM

## 2024-05-15 DIAGNOSIS — G30.0 SEVERE EARLY ONSET ALZHEIMER'S DEMENTIA WITH AGITATION (H): ICD-10-CM

## 2024-05-15 DIAGNOSIS — F02.80 MAJOR NEUROCOGNITIVE DISORDER DUE TO ALZHEIMER'S DISEASE (H): ICD-10-CM

## 2024-05-15 DIAGNOSIS — G30.9 MAJOR NEUROCOGNITIVE DISORDER DUE TO ALZHEIMER'S DISEASE (H): ICD-10-CM

## 2024-05-15 DIAGNOSIS — F02.C11 SEVERE EARLY ONSET ALZHEIMER'S DEMENTIA WITH AGITATION (H): ICD-10-CM

## 2024-05-15 PROCEDURE — 99349 HOME/RES VST EST MOD MDM 40: CPT | Performed by: NURSE PRACTITIONER

## 2024-05-15 NOTE — PROGRESS NOTES
"[facility] GERIATRICS    Chief Complaint   Patient presents with    RECHECK     HPI:  Mildred Connelly is a 73 year old  (1950), who [id number]       Today's concern is:     During exam, patient seen sitting in wheelchair. HPI limited due to dementia. Reports doing well, denies pain or concerns today. Admits to good appetite. Sleeping well at night. Denies constipation, diarrhea. Denies chest pain, SOB, headache, syncope.         Allergies, and PMH/PSH reviewed in [facility] today.    REVIEW OF SYSTEMS:  Limited secondary to cognitive impairment but today pt reports no concerns    Objective:   /77   Pulse 73   Temp 97.8  F (36.6  C)   Resp 20   Ht 1.702 m (5' 7\")   Wt 71.7 kg (158 lb)   SpO2 94%   BMI 24.75 kg/m    GENERAL APPEARANCE:  Alert, in no distress, appears healthy, cooperative  RESP:  respiratory effort and palpation of chest normal, lungs clear to auscultation , no respiratory distress  CV:  regular rate and rhythm, no murmur, rub, or gallop, no edema  ABDOMEN:  normal bowel sounds, soft, nontender, no guarding or rebound  M/S:   Gait and station abnormal, sitting in wheelchair.  NEURO:   Cranial nerves 2-12 are normal tested and grossly at patient's baseline, Examination of sensation by touch normal  PSYCH:  oriented to self, memory impaired , affect and mood normal      Recent labs in [facility] reviewed by me today.    Most Recent 3 CBC's:  Recent Labs   Lab Test 03/20/24  1152 02/13/24  0712 02/12/24  0707   WBC 7.9 7.7 9.2   HGB 13.8 9.3* 9.9*   * 97 97    202 208     Most Recent 3 BMP's:  Recent Labs   Lab Test 04/10/24  1118 03/20/24  1152 02/14/24  0649 02/13/24  0712 02/12/24  0707    140  --   --  140   POTASSIUM 4.0 4.8 3.6   < > 3.5   CHLORIDE 102 102  --   --  103   CO2 29 21*  --   --  27   BUN 20.3 19.3  --   --  16.8   CR 0.73 0.61  --   --  0.64   ANIONGAP 11 17*  --   --  10   EV 9.7 " 9.4  --   --  8.8   GLC 80 88  --   --  94    < > = values in this interval not displayed.     Most Recent 2 LFT's:  Recent Labs   Lab Test 04/10/24  1118   AST 16   ALT 20   ALKPHOS 97   BILITOTAL 0.6     TSH   Date Value Ref Range Status   04/10/2024 1.64 0.30 - 4.20 uIU/mL Final         Assessment/Plan:    (S72.001D) Closed fracture of right hip with routine healing, subsequent encounter  (primary encounter diagnosis)  (Z98.890,  Z87.81) S/P ORIF (open reduction internal fixation) fracture  (D64.9) Postoperative anemia  Comment: R hip fracture r/t fall on 2/7/24, s/p ORIF on 2/8/24. Postop anemia. Pain controlled. Ambulates 50ft w/walker.   Plan:    - Home care PT, OT following  - Continue tylenol BID and every day PRN   - Patient to follow-up with Dr. Dick (Ortho) PRN    (G30.0,  F02.C11) 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 SLUMS 3/30 (11/2023). Increased episodes of anxiety in evening hrs/sundowning.  Plan:   - Change administration time of HS med pass to 7PM  - Continue depakote, melatonin  - Continue lexapro 20mg every day  - Continue aricept, namenda  - Monitor changes in mood or behaviors  - Continue supportive services at [facility] memory care unit  - Patient to follow-up with Neurology as directed  - UPDATE:  Left voicemail with Dayana (daughter)       Orders:  - Change administration time of HS med pass to 7PM      Electronically signed by: ERNST Valenzuela CNP

## 2024-05-15 NOTE — LETTER
"    5/15/2024        RE: Mildred Connelly  C/o Gaye Yevgeniy  [address] [facility] [address]        [facility] GERIATRICS    Chief Complaint   Patient presents with     RECHECK     HPI:  Mildred Connelly is a 73 year old  (1950), who is being seen today for an episodic care visit at: [facility])(FGS) [[id number]].       Today's concern is:     During exam, patient seen sitting in wheelchair. HPI limited due to dementia. Reports doing well, denies pain or concerns today. Admits to good appetite. Sleeping well at night. Denies constipation, diarrhea. Denies chest pain, SOB, headache, syncope.         Allergies, and PMH/PSH reviewed in [facility] today.    REVIEW OF SYSTEMS:  Limited secondary to cognitive impairment but today pt reports no concerns    Objective:   /77   Pulse 73   Temp 97.8  F (36.6  C)   Resp 20   Ht 1.702 m (5' 7\")   Wt 71.7 kg (158 lb)   SpO2 94%   BMI 24.75 kg/m    GENERAL APPEARANCE:  Alert, in no distress, appears healthy, cooperative  RESP:  respiratory effort and palpation of chest normal, lungs clear to auscultation , no respiratory distress  CV:  regular rate and rhythm, no murmur, rub, or gallop, no edema  ABDOMEN:  normal bowel sounds, soft, nontender, no guarding or rebound  M/S:   Gait and station abnormal, sitting in wheelchair.  NEURO:   Cranial nerves 2-12 are normal tested and grossly at patient's baseline, Examination of sensation by touch normal  PSYCH:  oriented to self, memory impaired , affect and mood normal      Recent labs in [facility] reviewed by me today.    Most Recent 3 CBC's:  Recent Labs   Lab Test 03/20/24  1152 02/13/24  0712 02/12/24  0707   WBC 7.9 7.7 9.2   HGB 13.8 9.3* 9.9*   * 97 97    202 208     Most Recent 3 BMP's:  Recent Labs   Lab Test 04/10/24  1118 03/20/24  1152 02/14/24  0649 02/13/24  0712 02/12/24  0707    140  --   --  140   POTASSIUM 4.0 4.8 3.6   < > 3.5   CHLORIDE 102 102  --   --  103   CO2 29 21*  " --   --  27   BUN 20.3 19.3  --   --  16.8   CR 0.73 0.61  --   --  0.64   ANIONGAP 11 17*  --   --  10   EV 9.7 9.4  --   --  8.8   GLC 80 88  --   --  94    < > = values in this interval not displayed.     Most Recent 2 LFT's:  Recent Labs   Lab Test 04/10/24  1118   AST 16   ALT 20   ALKPHOS 97   BILITOTAL 0.6     TSH   Date Value Ref Range Status   04/10/2024 1.64 0.30 - 4.20 uIU/mL Final         Assessment/Plan:    (S72.001D) Closed fracture of right hip with routine healing, subsequent encounter  (primary encounter diagnosis)  (Z98.890,  Z87.81) S/P ORIF (open reduction internal fixation) fracture  (D64.9) Postoperative anemia  Comment: R hip fracture r/t fall on 24, s/p ORIF on 24. Postop anemia. Pain controlled. Ambulates 50ft w/walker.   Plan:    - Home care PT, OT following  - Continue tylenol BID and every day PRN   - Patient to follow-up with Dr. Dick (Ortho) PRN    (G30.0,  F02.C11) 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 SLUMS 3/30 (2023). Increased episodes of anxiety in evening hrs/.  Plan:   - Change administration time of HS med pass to 7PM  - Continue depakote, melatonin  - Continue lexapro 20mg every day  - Continue aricept, namenda  - Monitor changes in mood or behaviors  - Continue supportive services at [facility] memory care unit  - Patient to follow-up with Neurology as directed  - UPDATE:  Left voicemail with Dayana (daughter)       Orders:  - Change administration time of HS med pass to 7PM      Electronically signed by: ERNST Valenzuela [facility] Geriatrics   May 22, 2024     Name: Mildred HECTOR Connelly   : 1950       Orders:  - Change administration time of HS med pass to 7PM      Electronically signed by  ERNST Valenzuela CNP on 2024 at 5:05  PM        Sincerely,        ERNST Valenzuela CNP

## 2024-05-22 NOTE — PROGRESS NOTES
[facility] Geriatrics   May 22, 2024     Name: Mildred Connelly   : 1950       Orders:  - Change administration time of HS med pass to 7PM      Electronically signed by  ERNST Valenzuela CNP on 2024 at 5:05 PM

## 2024-05-27 DIAGNOSIS — Z53.9 DIAGNOSIS NOT YET DEFINED: Primary | ICD-10-CM

## 2024-05-27 PROCEDURE — G0179 MD RECERTIFICATION HHA PT: HCPCS | Performed by: NURSE PRACTITIONER

## 2024-06-09 DIAGNOSIS — R52 PAIN: Primary | ICD-10-CM

## 2024-06-10 RX ORDER — LIDOCAINE PAIN RELIEF 40 MG/1000MG
PATCH TOPICAL
Qty: 30 PATCH | Refills: 97 | Status: SHIPPED | OUTPATIENT
Start: 2024-06-10

## 2024-07-03 ENCOUNTER — MYC MEDICAL ADVICE (OUTPATIENT)
Dept: GERIATRICS | Facility: CLINIC | Age: 74
End: 2024-07-03
Payer: MEDICARE

## 2024-07-04 ENCOUNTER — DOCUMENTATION ONLY (OUTPATIENT)
Dept: GERIATRICS | Facility: CLINIC | Age: 74
End: 2024-07-04
Payer: MEDICARE

## 2024-07-09 ENCOUNTER — DOCUMENTATION ONLY (OUTPATIENT)
Dept: GERIATRICS | Facility: CLINIC | Age: 74
End: 2024-07-09
Payer: MEDICARE

## 2024-07-09 DIAGNOSIS — E55.9 VITAMIN D DEFICIENCY: ICD-10-CM

## 2024-07-09 RX ORDER — VITAMIN B COMPLEX
1 TABLET ORAL DAILY
Qty: 30 TABLET | Refills: 11 | Status: SHIPPED | OUTPATIENT
Start: 2024-07-09

## 2024-07-09 NOTE — PROGRESS NOTES
[facility] Geriatrics   2024     Name: Mildred Connelly   : 1950       Orders:  - Decrease vitamin D to 1000units every day dx vitamin D deficiency  - Add magnesium L-threonate, take 1 capsule at bedtime x 1 week, then increase to 1 capsule BID dx dementia (ok to take home supply)  - Check BMP, Mg level, vitamin D level on 24 dx dementia, insomnia, vitamin D deficiency      Electronically signed by  ERNST Valenzuela CNP on 2024 at 3:16 PM

## 2024-07-11 ENCOUNTER — DOCUMENTATION ONLY (OUTPATIENT)
Dept: OTHER | Facility: CLINIC | Age: 74
End: 2024-07-11
Payer: MEDICARE

## 2024-07-22 ENCOUNTER — LAB REQUISITION (OUTPATIENT)
Dept: LAB | Facility: CLINIC | Age: 74
End: 2024-07-22
Payer: MEDICARE

## 2024-07-22 DIAGNOSIS — G47.00 INSOMNIA, UNSPECIFIED: ICD-10-CM

## 2024-07-22 DIAGNOSIS — F03.90 UNSPECIFIED DEMENTIA, UNSPECIFIED SEVERITY, WITHOUT BEHAVIORAL DISTURBANCE, PSYCHOTIC DISTURBANCE, MOOD DISTURBANCE, AND ANXIETY (H): ICD-10-CM

## 2024-07-22 DIAGNOSIS — E55.9 VITAMIN D DEFICIENCY, UNSPECIFIED: ICD-10-CM

## 2024-07-24 LAB
ANION GAP SERPL CALCULATED.3IONS-SCNC: 11 MMOL/L (ref 7–15)
BUN SERPL-MCNC: 18.4 MG/DL (ref 8–23)
CALCIUM SERPL-MCNC: 9.4 MG/DL (ref 8.8–10.4)
CHLORIDE SERPL-SCNC: 103 MMOL/L (ref 98–107)
CREAT SERPL-MCNC: 0.73 MG/DL (ref 0.51–0.95)
EGFRCR SERPLBLD CKD-EPI 2021: 87 ML/MIN/1.73M2
GLUCOSE SERPL-MCNC: 76 MG/DL (ref 70–99)
HCO3 SERPL-SCNC: 29 MMOL/L (ref 22–29)
MAGNESIUM SERPL-MCNC: 2.4 MG/DL (ref 1.7–2.3)
POTASSIUM SERPL-SCNC: 3.8 MMOL/L (ref 3.4–5.3)
SODIUM SERPL-SCNC: 143 MMOL/L (ref 135–145)
VIT D+METAB SERPL-MCNC: 50 NG/ML (ref 20–50)

## 2024-07-24 PROCEDURE — P9603 ONE-WAY ALLOW PRORATED MILES: HCPCS | Mod: ORL | Performed by: NURSE PRACTITIONER

## 2024-07-24 PROCEDURE — 83735 ASSAY OF MAGNESIUM: CPT | Mod: ORL | Performed by: NURSE PRACTITIONER

## 2024-07-24 PROCEDURE — 36415 COLL VENOUS BLD VENIPUNCTURE: CPT | Mod: ORL | Performed by: NURSE PRACTITIONER

## 2024-07-24 PROCEDURE — 80048 BASIC METABOLIC PNL TOTAL CA: CPT | Mod: ORL | Performed by: NURSE PRACTITIONER

## 2024-07-24 PROCEDURE — 82306 VITAMIN D 25 HYDROXY: CPT | Mod: ORL | Performed by: NURSE PRACTITIONER

## 2024-07-25 ENCOUNTER — TELEPHONE (OUTPATIENT)
Dept: GERIATRICS | Facility: CLINIC | Age: 74
End: 2024-07-25
Payer: COMMERCIAL

## 2024-07-25 NOTE — TELEPHONE ENCOUNTER
[facility] Geriatrics Lab Note     Provider: ERNST Fajardo CNP   Facility: [facility]  Facility Type:  AL    No Known Allergies    Labs Reviewed by provider: Vitamin D, Magnesium, BMP.       Verbal Order/Direction given by Provider: Decrease Magnesium L-Threonate to 1 capsule daily.      Provider giving Order:  ERNST Fajardo CNP     Verbal Order given to: Annamaria Davidson RN

## 2024-07-31 ENCOUNTER — ASSISTED LIVING VISIT (OUTPATIENT)
Dept: GERIATRICS | Facility: CLINIC | Age: 74
End: 2024-07-31
Payer: MEDICARE

## 2024-07-31 VITALS
HEART RATE: 70 BPM | TEMPERATURE: 97.4 F | DIASTOLIC BLOOD PRESSURE: 78 MMHG | HEIGHT: 67 IN | BODY MASS INDEX: 24.74 KG/M2 | RESPIRATION RATE: 14 BRPM | SYSTOLIC BLOOD PRESSURE: 117 MMHG | OXYGEN SATURATION: 93 % | WEIGHT: 157.6 LBS

## 2024-07-31 DIAGNOSIS — Z87.81 S/P ORIF (OPEN REDUCTION INTERNAL FIXATION) FRACTURE: ICD-10-CM

## 2024-07-31 DIAGNOSIS — F02.80 MAJOR NEUROCOGNITIVE DISORDER DUE TO ALZHEIMER'S DISEASE (H): ICD-10-CM

## 2024-07-31 DIAGNOSIS — D64.9 POSTOPERATIVE ANEMIA: ICD-10-CM

## 2024-07-31 DIAGNOSIS — F02.C11 SEVERE EARLY ONSET ALZHEIMER'S DEMENTIA WITH AGITATION (H): Primary | ICD-10-CM

## 2024-07-31 DIAGNOSIS — G30.0 SEVERE EARLY ONSET ALZHEIMER'S DEMENTIA WITH AGITATION (H): Primary | ICD-10-CM

## 2024-07-31 DIAGNOSIS — Z98.890 S/P ORIF (OPEN REDUCTION INTERNAL FIXATION) FRACTURE: ICD-10-CM

## 2024-07-31 DIAGNOSIS — G30.9 MAJOR NEUROCOGNITIVE DISORDER DUE TO ALZHEIMER'S DISEASE (H): ICD-10-CM

## 2024-07-31 PROCEDURE — 99349 HOME/RES VST EST MOD MDM 40: CPT | Performed by: NURSE PRACTITIONER

## 2024-07-31 NOTE — PROGRESS NOTES
"[facility] GERIATRICS    Chief Complaint   Patient presents with    RECHECK     HPI:  Mildred Connelly is a 73 year old  (1950), who is being seen today for an episodic care visit at: [facility])(FGS) [[id number]].       Today's concern is:     During exam, patient seen sitting in wheelchair. HPI limited due to dementia. Reports doing well, denies pain or concerns today. Admits to good appetite. Sleeping well at night. Denies constipation, diarrhea. Denies chest pain, SOB, headache, syncope.       Allergies, and PMH/PSH reviewed in [facility] today.    REVIEW OF SYSTEMS:  Limited secondary to cognitive impairment but today pt reports no concerns      Objective:   /78   Pulse 70   Temp 97.4  F (36.3  C)   Resp 14   Ht 1.702 m (5' 7\")   Wt 71.5 kg (157 lb 9.6 oz)   SpO2 93%   BMI 24.68 kg/m    GENERAL APPEARANCE:  Alert, in no distress, appears healthy, cooperative  RESP:  respiratory effort and palpation of chest normal, lungs clear to auscultation , no respiratory distress  CV:  regular rate and rhythm, no murmur, rub, or gallop, no edema  ABDOMEN:  normal bowel sounds, soft, nontender, no guarding or rebound  M/S:   Gait and station abnormal, sitting in wheelchair.  NEURO:   Cranial nerves 2-12 are normal tested and grossly at patient's baseline, Examination of sensation by touch normal  PSYCH:  oriented to self, memory impaired , affect and mood normal    Wt Readings from Last 4 Encounters:   07/31/24 71.5 kg (157 lb 9.6 oz)   05/15/24 71.7 kg (158 lb)   03/12/24 76.4 kg (168 lb 6.4 oz)   02/07/24 72.6 kg (160 lb)       Recent labs in [facility] reviewed by me today.  Most Recent 3 CBC's:  Recent Labs   Lab Test 03/20/24  1152 02/13/24  0712 02/12/24  0707   WBC 7.9 7.7 9.2   HGB 13.8 9.3* 9.9*   * 97 97    202 208     Most Recent 3 BMP's:  Recent Labs   Lab Test 07/24/24  1050 04/10/24  1118 03/20/24  1152    142 140   POTASSIUM 3.8 4.0 4.8   CHLORIDE 103 102 102   CO2 29 29 21* "   BUN 18.4 20.3 19.3   CR 0.73 0.73 0.61   ANIONGAP 11 11 17*   EV 9.4 9.7 9.4   GLC 76 80 88     Most Recent 2 LFT's:  Recent Labs   Lab Test 04/10/24  1118   AST 16   ALT 20   ALKPHOS 97   BILITOTAL 0.6      Latest Reference Range & Units 07/24/24 10:50   Magnesium 1.7 - 2.3 mg/dL 2.4 (H)       TSH   Date Value Ref Range Status   04/10/2024 1.64 0.30 - 4.20 uIU/mL Final         Assessment/Plan:    (G30.0,  F02.C11) 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 SLUMS 3/30 (11/2023). Increased episodes of anxiety in evening hrs/sundowning.  Plan:   - Recently decreased magnesium L-threonate to every day due to slightly elevated Mg level. Change administration time to HS.   - coordinated care with  pharmacist regarding Magtein and alternative treatment options. Alternative Mg supplements would not have same ingredients as Magtein, unclear benefit of switching to Slow-Mg.  - Continue depakote, melatonin, seroquel, lexapro  - Continue aricept, namenda  - Monitor changes in mood or behaviors  - Continue supportive services at [facility] memory care unit  - Patient to follow-up with Neurology as directed  - Reviewed patient status and treatment plan with Dayana (daughter).     (K79.768Y) Closed fracture of right hip with routine healing, subsequent encounter    (Z98.890,  Z87.81) S/P ORIF (open reduction internal fixation) fracture  (D64.9) Postoperative anemia  Comment: R hip fracture r/t fall on 2/7/24, s/p ORIF on 2/8/24. Postop anemia - resolved. Pain controlled. Ambulates w/walker.   Plan:    - Discontinue lidocaine patch  - Continue tylenol BID and every day PRN       Orders:  - Change administration time of Magnesium-L-threonate to 1 capsule at bedtime dx dementia  - Discontinue lidocaine patch        Electronically signed by: Marie High  APRN CNP

## 2024-07-31 NOTE — LETTER
7/31/2024      Mildred Connelly  C/o Gaye Vuong  [address] [facility] [address]        No notes on file      Sincerely,        ERNST Valenzuela CNP

## 2024-07-31 NOTE — PROGRESS NOTES
[facility] Geriatrics   2024     Name: Mildred Connelly   : 1950       Orders:  - Change administration time of Magnesium-L-threonate to 1 capsule at bedtime dx dementia  - Discontinue lidocaine patch        Electronically signed by   ERNST Valenzuela CNP on 2024 at 2:33 PM

## 2024-10-04 ENCOUNTER — TELEPHONE (OUTPATIENT)
Dept: GERIATRICS | Facility: CLINIC | Age: 74
End: 2024-10-04
Payer: MEDICARE

## 2024-10-04 DIAGNOSIS — G30.0 SEVERE EARLY ONSET ALZHEIMER'S DEMENTIA WITH AGITATION (H): ICD-10-CM

## 2024-10-04 DIAGNOSIS — F02.C11 SEVERE EARLY ONSET ALZHEIMER'S DEMENTIA WITH AGITATION (H): ICD-10-CM

## 2024-10-04 RX ORDER — QUETIAPINE FUMARATE 50 MG/1
TABLET, FILM COATED ORAL
Status: SHIPPED
Start: 2024-10-04

## 2024-10-04 NOTE — TELEPHONE ENCOUNTER
[facility] Triage Nurse Telephone Encounter    Provider: ERNST Fajardo CNP   Facility: [facility]  Facility Type:  AL    Caller: Vaishali  Call Back Number: [phone]    Allergies:  No Known Allergies     Reason for call: Pt has had ongoing agitation in the afternoon typically after lunch. Recently it has become more severe and pt has attempted to hit staff. Pt will also be heard talking to herself for periods of time. These behaviors are seen more in the afternoon hours. She is currently on Seroquel 50mg at noon and HS. Nursing asks if they can get a PRN dose for when these behaviors occur.    Verbal Order/Direction given by Provider:   - Change administration time of seroquel to 11AM and 6PM   - Change PM medication administration time from 7pm to 6pm  - Add Seroquel 25mg PO daily as needed  Dx: Agitation    Provider giving Order:  ERNST Fajardo CNP     Verbal Order given to: Carly Holbrook RN

## 2024-10-28 NOTE — PROGRESS NOTES
"[facility]    Chief Complaint   Patient presents with    RECHECK     HPI:  Mildred Connelly is a 74 year old  (1950), who is being seen today for an episodic care visit at: [facility])(FGS) [[id number]]. Today's concern is: ***    Allergies, and PMH/PSH reviewed in [facility] today.  REVIEW OF SYSTEMS:  {olptld83:158176}    Objective:   /64   Pulse 68   Temp 98.1  F (36.7  C)   Resp 22   Ht 1.702 m (5' 7\")   Wt 71.5 kg (157 lb 9.6 oz)   SpO2 95%   BMI 24.68 kg/m    {Nursing home physical exam :853409}    {slab:695372}    Assessment/Plan:  {[facility] DX2:903583}    MED REC REQUIRED{TIP  Click the link below to document or use med rec list, use list to pull in response :975869}  Post Medication Reconciliation Status: {MED REC LIST:811171}      Orders:  {fgsorders:635758}  ***    Electronically signed by: Steff Bhakta MA ***     " 88     Magnesium   Date Value Ref Range Status   07/24/2024 2.4 (H) 1.7 - 2.3 mg/dL Final       Assessment/Plan:    (G30.0,  F02.C11) 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 SLUMS 3/30 (11/2023).  Plan:   - Continue magnesium L-threonate at bedtime; recheck Mg level in 12/2024  - Continue depakote, melatonin, seroquel, lexapro  - Continue aricept, namenda  - Monitor changes in mood or behaviors  - Continue supportive services at [facility] memory care unit  - Patient to follow-up with Neurology as directed        Electronically signed by: ERNST Valenzuela CNP

## 2024-10-29 VITALS
HEIGHT: 67 IN | HEART RATE: 68 BPM | OXYGEN SATURATION: 95 % | RESPIRATION RATE: 22 BRPM | WEIGHT: 157.6 LBS | TEMPERATURE: 98.1 F | BODY MASS INDEX: 24.74 KG/M2 | SYSTOLIC BLOOD PRESSURE: 120 MMHG | DIASTOLIC BLOOD PRESSURE: 64 MMHG

## 2024-10-30 ENCOUNTER — ASSISTED LIVING VISIT (OUTPATIENT)
Dept: GERIATRICS | Facility: CLINIC | Age: 74
End: 2024-10-30
Payer: MEDICARE

## 2024-10-30 DIAGNOSIS — F02.80 MAJOR NEUROCOGNITIVE DISORDER DUE TO ALZHEIMER'S DISEASE (H): ICD-10-CM

## 2024-10-30 DIAGNOSIS — F02.C11 SEVERE EARLY ONSET ALZHEIMER'S DEMENTIA WITH AGITATION (H): Primary | ICD-10-CM

## 2024-10-30 DIAGNOSIS — G30.0 SEVERE EARLY ONSET ALZHEIMER'S DEMENTIA WITH AGITATION (H): Primary | ICD-10-CM

## 2024-10-30 DIAGNOSIS — G30.9 MAJOR NEUROCOGNITIVE DISORDER DUE TO ALZHEIMER'S DISEASE (H): ICD-10-CM

## 2024-10-30 PROCEDURE — 99349 HOME/RES VST EST MOD MDM 40: CPT | Performed by: NURSE PRACTITIONER

## 2024-10-30 NOTE — LETTER
10/30/2024      Mildred Connelly  C/o Gaye Yevgeniy  [address] [address]        [facility]    No chief complaint on file.    HPI:  Mildred Connelly is a 74 year old  (1950), who is being seen today for an episodic care visit at: No question data found.. Today's concern is: ***    Allergies, and PMH/PSH reviewed in [facility] today.  REVIEW OF SYSTEMS:  {bcmpzi41:606472}    Objective:   There were no vitals taken for this visit.  {assisted physical exam :017547}    {fgslab:540694}    Assessment/Plan:  {[facility] DX2:134290}    MED REC REQUIRED{TIP  Click the link below to document or use med rec list, use list to pull in response :701838}  Post Medication Reconciliation Status: {MED REC LIST:608078}      Orders:  {fgsorders:641634}  ***    Electronically signed by: Steff Bhakta MA ***       [facility]    Chief Complaint   Patient presents with    RECHECK     HPI:  Mildred Connelly is a 74 year old  (1950), who is being seen today for an episodic care visit at: [facility])(FGS) [[id number]]. Today's concern is: ***    Allergies, and PMH/PSH reviewed in [facility] today.  REVIEW OF SYSTEMS:  {swlocv15:841985}    Objective:   There were no vitals taken for this visit.  {assisted physical exam :624998}    {fgslab:833835}    Assessment/Plan:  {[facility] DX2:809083}    MED REC REQUIRED{TIP  Click the link below to document or use med rec list, use list to pull in response :266422}  Post Medication Reconciliation Status: {MED REC LIST:409655}      Orders:  {fgsorders:006893}  ***    Electronically signed by: Steff Bhakta MA ***          Sincerely,        ERNST Valenzuela CNP

## 2024-10-30 NOTE — LETTER
" 10/30/2024      Mildred Connelly  C/o Gaye Vuong  [address] [facility] [address] [facility] [address]        [facility]    Chief Complaint   Patient presents with     RECHECK     HPI:  Mildred Connelly is a 74 year old  (1950), who is being seen today for an episodic care visit at: [facility])(FGS) [[id number]].       Today's concern is:     During exam, patient seen sitting in common area of memory care unit. HPI limited due to dementia. Reports doing well. Denies pain or concerns. Ambulates w/walker. Admits to good appetite, unable to recall what she ate for breakfast. Sleeping well at night. Denies constipation, diarrhea. Denies chest pain, SOB, headache, syncope.      Allergies, and PMH/PSH reviewed in [facility] today.    REVIEW OF SYSTEMS:  Limited secondary to cognitive impairment but today pt reports no concerns    Objective:   /64   Pulse 68   Temp 98.1  F (36.7  C)   Resp 22   Ht 1.702 m (5' 7\")   Wt 71.5 kg (157 lb 9.6 oz)   SpO2 95%   BMI 24.68 kg/m    GENERAL APPEARANCE:  Alert, in no distress, appears healthy, cooperative  RESP:  respiratory effort and palpation of chest normal, lungs clear to auscultation , no respiratory distress  CV:  regular rate and rhythm, no murmur, rub, or gallop, no edema  ABDOMEN:  normal bowel sounds, soft, nontender, no guarding or rebound  M/S:   Gait and station abnormal, ambulates w/walker.  NEURO:   Cranial nerves 2-12 are normal tested and grossly at patient's baseline, Examination of sensation by touch normal  PSYCH:  oriented to self, memory impaired , affect and mood normal      Recent labs in [facility] reviewed by me today.   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 SLUMS 3/30 (2023).  Plan:   - Continue magnesium L-threonate at bedtime; recheck Mg level in 2024  - Continue depakote, melatonin, seroquel, lexapro  - Continue aricept, namenda  - Monitor changes in mood or behaviors  - Continue supportive services at [facility] memory care unit  - Patient to follow-up with Neurology as directed        Electronically signed by: ERNST Valenzuela CNP               [facility]   2024     Name: Mildred Connelly   : 1950       Orders:  - Check BMP, Mg level on 24 dx hypermagnesemia, dementia         Electronically signed by  ERNST Valenzuela CNP on 2024 at 4:07 PM            Sincerely,        ERNST Valenzuela CNP

## 2024-11-08 DIAGNOSIS — S72.001D CLOSED FRACTURE OF RIGHT HIP WITH ROUTINE HEALING, SUBSEQUENT ENCOUNTER: ICD-10-CM

## 2024-11-09 RX ORDER — PSEUDOEPHED/ACETAMINOPH/DIPHEN 30MG-500MG
TABLET ORAL
Qty: 186 TABLET | Refills: 11 | Status: SHIPPED | OUTPATIENT
Start: 2024-11-09

## 2024-11-19 NOTE — PROGRESS NOTES
[facility]   2024     Name: Mildred Connelly   : 1950       Orders:  - Check BMP, Mg level on 24 dx hypermagnesemia, dementia         Electronically signed by  ERNST Valenzuela CNP on 2024 at 4:07 PM

## 2024-11-24 DIAGNOSIS — F03.C4: Primary | ICD-10-CM

## 2024-11-25 RX ORDER — QUETIAPINE FUMARATE 25 MG/1
25 TABLET, FILM COATED ORAL DAILY PRN
Qty: 30 TABLET | Refills: 97 | Status: SHIPPED | OUTPATIENT
Start: 2024-11-25

## 2024-12-02 ENCOUNTER — LAB REQUISITION (OUTPATIENT)
Dept: LAB | Facility: CLINIC | Age: 74
End: 2024-12-02
Payer: MEDICARE

## 2024-12-02 DIAGNOSIS — E83.41 HYPERMAGNESEMIA: ICD-10-CM

## 2024-12-02 DIAGNOSIS — F02.C11: ICD-10-CM

## 2024-12-02 DIAGNOSIS — F02.80 DEMENTIA IN OTHER DISEASES CLASSIFIED ELSEWHERE, UNSPECIFIED SEVERITY, WITHOUT BEHAVIORAL DISTURBANCE, PSYCHOTIC DISTURBANCE, MOOD DISTURBANCE, AND ANXIETY (H): ICD-10-CM

## 2024-12-02 DIAGNOSIS — G30.0 ALZHEIMER'S DISEASE WITH EARLY ONSET (CODE) (H): ICD-10-CM

## 2024-12-04 PROCEDURE — 83735 ASSAY OF MAGNESIUM: CPT | Mod: ORL | Performed by: NURSE PRACTITIONER

## 2024-12-04 PROCEDURE — 80048 BASIC METABOLIC PNL TOTAL CA: CPT | Mod: ORL | Performed by: NURSE PRACTITIONER

## 2024-12-04 PROCEDURE — P9603 ONE-WAY ALLOW PRORATED MILES: HCPCS | Mod: ORL | Performed by: NURSE PRACTITIONER

## 2024-12-04 PROCEDURE — 36415 COLL VENOUS BLD VENIPUNCTURE: CPT | Mod: ORL | Performed by: NURSE PRACTITIONER

## 2024-12-05 LAB
ANION GAP SERPL CALCULATED.3IONS-SCNC: 8 MMOL/L (ref 7–15)
BUN SERPL-MCNC: 25.5 MG/DL (ref 8–23)
CALCIUM SERPL-MCNC: 9.2 MG/DL (ref 8.8–10.4)
CHLORIDE SERPL-SCNC: 103 MMOL/L (ref 98–107)
CREAT SERPL-MCNC: 0.71 MG/DL (ref 0.51–0.95)
EGFRCR SERPLBLD CKD-EPI 2021: 90 ML/MIN/1.73M2
GLUCOSE SERPL-MCNC: 93 MG/DL (ref 70–99)
HCO3 SERPL-SCNC: 31 MMOL/L (ref 22–29)
MAGNESIUM SERPL-MCNC: 2.5 MG/DL (ref 1.7–2.3)
POTASSIUM SERPL-SCNC: 4.3 MMOL/L (ref 3.4–5.3)
SODIUM SERPL-SCNC: 142 MMOL/L (ref 135–145)

## 2024-12-28 DIAGNOSIS — G30.9 MAJOR NEUROCOGNITIVE DISORDER DUE TO ALZHEIMER'S DISEASE (H): ICD-10-CM

## 2024-12-28 DIAGNOSIS — F03.94 ANXIETY DUE TO DEMENTIA (H): Primary | ICD-10-CM

## 2024-12-28 DIAGNOSIS — Z78.9 TAKES DIETARY SUPPLEMENTS: ICD-10-CM

## 2024-12-28 DIAGNOSIS — F02.80 MAJOR NEUROCOGNITIVE DISORDER DUE TO ALZHEIMER'S DISEASE (H): ICD-10-CM

## 2024-12-28 DIAGNOSIS — F39 UNSPECIFIED MOOD (AFFECTIVE) DISORDER (H): ICD-10-CM

## 2024-12-30 RX ORDER — MEMANTINE HYDROCHLORIDE 10 MG/1
10 TABLET ORAL 2 TIMES DAILY
Qty: 180 TABLET | Refills: 97 | Status: SHIPPED | OUTPATIENT
Start: 2024-12-30

## 2024-12-30 RX ORDER — DONEPEZIL HYDROCHLORIDE 5 MG/1
5 TABLET, FILM COATED ORAL DAILY
Qty: 90 TABLET | Refills: 97 | Status: SHIPPED | OUTPATIENT
Start: 2024-12-30

## 2024-12-30 RX ORDER — ESCITALOPRAM OXALATE 20 MG/1
20 TABLET ORAL DAILY
Qty: 90 TABLET | Refills: 97 | Status: SHIPPED | OUTPATIENT
Start: 2024-12-30

## 2024-12-30 RX ORDER — DIVALPROEX SODIUM 125 MG/1
TABLET, DELAYED RELEASE ORAL
Qty: 180 TABLET | Refills: 97 | Status: SHIPPED | OUTPATIENT
Start: 2024-12-30

## 2024-12-30 RX ORDER — MULTIVIT-MIN/IRON/FA/VIT K/LUT 8MG-400MCG
1 TABLET ORAL DAILY
Qty: 90 TABLET | Refills: 97 | Status: SHIPPED | OUTPATIENT
Start: 2024-12-30

## 2025-01-12 DIAGNOSIS — F03.C4: ICD-10-CM

## 2025-01-13 ENCOUNTER — TELEPHONE (OUTPATIENT)
Dept: GERIATRICS | Facility: CLINIC | Age: 75
End: 2025-01-13
Payer: MEDICARE

## 2025-01-13 RX ORDER — QUETIAPINE FUMARATE 25 MG/1
25 TABLET, FILM COATED ORAL DAILY PRN
Qty: 30 TABLET | Refills: 97 | Status: SHIPPED | OUTPATIENT
Start: 2025-01-13

## 2025-01-13 NOTE — TELEPHONE ENCOUNTER
"[facility] Triage Call    Provider: ERNST Fajardo CNP   Facility: [facility]  Facility Type:  AL    Caller: Annamaria  Call Back Number: [phone]    Allergies:  No Known Allergies     SBAR:     S-(situation): Nurse is calling to report that since 1/10/25, patient is noted to be having urinary urgency and frequency.  When patient is asking to use the bathroom, she doesn't void.  She is however voiding at other times.  No c/o burning or pain with voiding.  Staff is noting that patient is looking for her mother and also saying that her mother is laying on her bed.  Staff also notes exit seeking behavior and increased wandering.      B-(background): n/a    A-(assessment): VS are stable.      R-(recommendations): ?UA/UC  ?labs   Please advise.         Telephone encounter sent to:  ERNST Fajardo CNP     Please send response/orders to \"Geriatrics Nurse Pool\"    Navin Braden RN      "

## 2025-01-14 ENCOUNTER — LAB REQUISITION (OUTPATIENT)
Dept: LAB | Facility: CLINIC | Age: 75
End: 2025-01-14
Payer: MEDICARE

## 2025-01-14 DIAGNOSIS — R35.0 FREQUENCY OF MICTURITION: ICD-10-CM

## 2025-01-14 DIAGNOSIS — R39.15 URGENCY OF URINATION: ICD-10-CM

## 2025-01-14 LAB
ALBUMIN UR-MCNC: 20 MG/DL
APPEARANCE UR: CLEAR
BILIRUB UR QL STRIP: NEGATIVE
COLOR UR AUTO: YELLOW
GLUCOSE UR STRIP-MCNC: NEGATIVE MG/DL
HGB UR QL STRIP: NEGATIVE
KETONES UR STRIP-MCNC: NEGATIVE MG/DL
LEUKOCYTE ESTERASE UR QL STRIP: ABNORMAL
NITRATE UR QL: NEGATIVE
PH UR STRIP: 6.5 [PH] (ref 5–7)
RBC URINE: 0 /HPF
SP GR UR STRIP: 1.02 (ref 1–1.03)
SQUAMOUS EPITHELIAL: 2 /HPF
TRANSITIONAL EPI: <1 /HPF
UROBILINOGEN UR STRIP-MCNC: NORMAL MG/DL
WBC URINE: 24 /HPF

## 2025-01-15 ENCOUNTER — ASSISTED LIVING VISIT (OUTPATIENT)
Dept: GERIATRICS | Facility: CLINIC | Age: 75
End: 2025-01-15
Payer: MEDICARE

## 2025-01-15 VITALS
RESPIRATION RATE: 18 BRPM | BODY MASS INDEX: 24.55 KG/M2 | HEIGHT: 67 IN | OXYGEN SATURATION: 94 % | HEART RATE: 64 BPM | SYSTOLIC BLOOD PRESSURE: 108 MMHG | WEIGHT: 156.4 LBS | DIASTOLIC BLOOD PRESSURE: 64 MMHG | TEMPERATURE: 98.2 F
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
## 2025-01-15 DIAGNOSIS — R53.81 PHYSICAL DECONDITIONING: ICD-10-CM

## 2025-01-15 DIAGNOSIS — F02.C11 SEVERE EARLY ONSET ALZHEIMER'S DEMENTIA WITH AGITATION (H): ICD-10-CM

## 2025-01-15 DIAGNOSIS — N30.00 ACUTE CYSTITIS WITHOUT HEMATURIA: Primary | ICD-10-CM

## 2025-01-15 DIAGNOSIS — G30.0 SEVERE EARLY ONSET ALZHEIMER'S DEMENTIA WITH AGITATION (H): ICD-10-CM

## 2025-01-15 LAB
BACTERIA UR CULT: ABNORMAL
BACTERIA UR CULT: ABNORMAL

## 2025-01-15 PROCEDURE — 99349 HOME/RES VST EST MOD MDM 40: CPT | Performed by: NURSE PRACTITIONER

## 2025-01-15 RX ORDER — CEPHALEXIN 500 MG/1
500 CAPSULE ORAL 2 TIMES DAILY
Qty: 14 CAPSULE | Refills: 0 | Status: SHIPPED | OUTPATIENT
Start: 2025-01-15 | End: 2025-01-22

## 2025-01-15 NOTE — LETTER
1/15/2025      Mildred Connelly  C/o Gaye Vuong  [address] [address]        No notes on file      Sincerely,        ERNST Valenzuela CNP    Electronically signed   [phone]     Most Recent 3 BMP's:  Recent Labs   Lab Test 12/04/24  1332 07/24/24  1050 04/10/24  1118    143 142   POTASSIUM 4.3 3.8 4.0   CHLORIDE 103 103 102   CO2 31* 29 29   BUN 25.5* 18.4 20.3   CR 0.71 0.73 0.73   ANIONGAP 8 11 11   EV 9.2 9.4 9.7   GLC 93 76 80        Latest Reference Range & Units 07/24/24 10:50 12/04/24 13:32   Magnesium 1.7 - 2.3 mg/dL 2.4 (H) 2.5 (H)       Estimated Creatinine Clearance: 80.2 mL/min (based on SCr of 0.71 mg/dL).                Assessment/Plan:    (N30.00) Acute cystitis without hematuria  (primary encounter diagnosis)  Comment: Acute UTI, UC + 10-50K Streptococcus agalctiae. Mild bacterial growth, unclear if symptomatic secondary to dementia. Decision to treat based on increase in behaviors and recent fall to see if behaviors improve following course of antibiotics.   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 Severe Alzheimer's dementia with hx agitation, paranoia, hallucinations. SLUMS 3/30 (11/2023).   Plan:   - Discontinue magnesium supplement due to hypermagnesemia   - Continue depakote, melatonin, seroquel, lexapro  - Continue aricept, namenda  - Monitor changes in mood or behaviors  - Continue supportive services at [facility] memory care unit  - Patient to follow-up with Neurology as directed  - Reviewed patient status and treatment plan with Dayana (daughter).       Orders:  - Add keflex 500mg BID x 7 days dx UTI   - Discontinue magnesium supplement        Electronically signed by: ERNST Valenzuela CNP           Sincerely,        ERNST Valenzuela CNP    Electronically signed

## 2025-01-15 NOTE — PATIENT INSTRUCTIONS
[facility]   January 15, 2025     Name: Mildred Connelly   : 1950       Orders:  - Add keflex 500mg BID x 7 days dx UTI   - Discontinue magnesium supplement      Electronically signed by   ERNST Valenzuela CNP on 1/15/2025 at 3:30 PM

## 2025-01-15 NOTE — PROGRESS NOTES
"[facility]IATR[facility]    Chief Complaint   Patient presents with    RECHECK     HPI:  Mildred Connelly is a 74 year old  (1950), who is being seen today for an episodic care visit at: [facility])(FGS) [[id number]].       Today's concern is:     RN reports patient having restlessness, wandering and increased urinary frequency. Afebrile.   Fall on 1/14/25 without injuries.           Allergies, and PMH/PSH reviewed in [facility] today.    REVIEW OF SYSTEMS:  {ovlwra03:366363}      Objective:   /64   Pulse 64   Temp 98.2  F (36.8  C)   Resp 18   Ht 1.702 m (5' 7\")   Wt 70.9 kg (156 lb 6.4 oz)   SpO2 94%   BMI 24.50 kg/m    {Nursing home physical exam :075759}      Recent labs in [facility] reviewed by me today.    Most Recent 3 CBC's:  Recent Labs   Lab Test 03/20/24  1152 02/13/24  0712 02/12/24  0707   WBC 7.9 7.7 9.2   HGB 13.8 9.3* 9.9*   * 97 97    202 208     Most Recent 3 BMP's:  Recent Labs   Lab Test 12/04/24  1332 07/24/24  1050 04/10/24  1118    143 142   POTASSIUM 4.3 3.8 4.0   CHLORIDE 103 103 102   CO2 31* 29 29   BUN 25.5* 18.4 20.3   CR 0.71 0.73 0.73   ANIONGAP 8 11 11   EV 9.2 9.4 9.7   GLC 93 76 80        Latest Reference Range & Units 07/24/24 10:50 12/04/24 13:32   Magnesium 1.7 - 2.3 mg/dL 2.4 (H) 2.5 (H)       Estimated Creatinine Clearance: 80.2 mL/min (based on SCr of 0.71 mg/dL).                Assessment/Plan:    {[facility] DX2:472339}      - Reviewed patient status and treatment plan with Dayana (daughter).         Orders:  - Add keflex 500mg BID x 7 days dx UTI   - Discontinue magnesium supplement        Electronically signed by: ERNST Valenzuela CNP       "    143 142   POTASSIUM 4.3 3.8 4.0   CHLORIDE 103 103 102   CO2 31* 29 29   BUN 25.5* 18.4 20.3   CR 0.71 0.73 0.73   ANIONGAP 8 11 11   EV 9.2 9.4 9.7   GLC 93 76 80        Latest Reference Range & Units 07/24/24 10:50 12/04/24 13:32   Magnesium 1.7 - 2.3 mg/dL 2.4 (H) 2.5 (H)       Estimated Creatinine Clearance: 80.2 mL/min (based on SCr of 0.71 mg/dL).                Assessment/Plan:    (N30.00) Acute cystitis without hematuria  (primary encounter diagnosis)  Comment: Acute UTI, UC + 10-50K Streptococcus agalctiae. Mild bacterial growth, unclear if symptomatic secondary to dementia. Decision to treat based on increase in behaviors and recent fall to see if behaviors improve following course of antibiotics.   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 Severe Alzheimer's dementia with hx agitation, paranoia, hallucinations. SLUMS 3/30 (11/2023).   Plan:   - Discontinue magnesium supplement due to hypermagnesemia   - Continue depakote, melatonin, seroquel, lexapro  - Continue aricept, namenda  - Monitor changes in mood or behaviors  - Continue supportive services at [facility] memory care unit  - Patient to follow-up with Neurology as directed  - Reviewed patient status and treatment plan with Dayana (daughter).       Orders:  - Add keflex 500mg BID x 7 days dx UTI   - Discontinue magnesium supplement        Electronically signed by: ERNST Valenzuela CNP

## 2025-01-22 ENCOUNTER — ASSISTED LIVING VISIT (OUTPATIENT)
Dept: GERIATRICS | Facility: CLINIC | Age: 75
End: 2025-01-22
Payer: MEDICARE

## 2025-01-22 VITALS
WEIGHT: 156.4 LBS | HEIGHT: 67 IN | OXYGEN SATURATION: 96 % | RESPIRATION RATE: 20 BRPM | DIASTOLIC BLOOD PRESSURE: 84 MMHG | SYSTOLIC BLOOD PRESSURE: 135 MMHG | BODY MASS INDEX: 24.55 KG/M2 | HEART RATE: 74 BPM | TEMPERATURE: 97.3 F

## 2025-01-22 DIAGNOSIS — N30.00 ACUTE CYSTITIS WITHOUT HEMATURIA: Primary | ICD-10-CM

## 2025-01-22 DIAGNOSIS — E78.2 MIXED HYPERLIPIDEMIA: ICD-10-CM

## 2025-01-22 DIAGNOSIS — F02.C11 SEVERE EARLY ONSET ALZHEIMER'S DEMENTIA WITH AGITATION (H): ICD-10-CM

## 2025-01-22 DIAGNOSIS — G30.0 SEVERE EARLY ONSET ALZHEIMER'S DEMENTIA WITH AGITATION (H): ICD-10-CM

## 2025-01-22 PROCEDURE — 99349 HOME/RES VST EST MOD MDM 40: CPT | Performed by: NURSE PRACTITIONER

## 2025-01-22 NOTE — PROGRESS NOTES
"[facility]ERIATRICS    Chief Complaint   Patient presents with    RECHECK     HPI:  Mildred Connelly is a 74 year old  (1950), who is being seen today for an episodic care visit at: [facility])(FGS) [[id number]].       Today's concern is: ***        Allergies, and PMH/PSH reviewed in [facility] today.    REVIEW OF SYSTEMS:  {vozxzc81:041349}      Objective:   /84   Pulse 74   Temp 97.3  F (36.3  C)   Resp 20   Ht 1.702 m (5' 7\")   Wt 70.9 kg (156 lb 6.4 oz)   SpO2 96%   BMI 24.50 kg/m    {Nursing home physical exam :258947}    Wt Readings from Last 4 Encounters:   01/22/25 70.9 kg (156 lb 6.4 oz)   01/15/25 70.9 kg (156 lb 6.4 oz)   10/29/24 71.5 kg (157 lb 9.6 oz)   07/31/24 71.5 kg (157 lb 9.6 oz)       Recent labs in [facility] reviewed by me today.    Most Recent 3 CBC's:  Recent Labs   Lab Test 03/20/24  1152 02/13/24  0712 02/12/24  0707   WBC 7.9 7.7 9.2   HGB 13.8 9.3* 9.9*   * 97 97    202 208     Most Recent 3 BMP's:  Recent Labs   Lab Test 12/04/24  1332 07/24/24  1050 04/10/24  1118    143 142   POTASSIUM 4.3 3.8 4.0   CHLORIDE 103 103 102   CO2 31* 29 29   BUN 25.5* 18.4 20.3   CR 0.71 0.73 0.73   ANIONGAP 8 11 11   EV 9.2 9.4 9.7   GLC 93 76 80     Liver Function Studies -   Recent Labs   Lab Test 04/10/24  1118   PROTTOTAL 7.5   ALBUMIN 4.4   BILITOTAL 0.6   ALKPHOS 97   AST 16   ALT 20       TSH   Date Value Ref Range Status   04/10/2024 1.64 0.30 - 4.20 uIU/mL Final           Assessment/Plan:    {[facility] DX2:126344}      - Course of keflex to be completed on 1/23/25    - Increase depakote to 125mg qAM and 250mg at bedtime dx agitation due to dementia      UPDATE:  - Reviewed patient status and treatment plan with Dayana (daughter).         Orders:  - Increase depakote to 125mg qAM and 250mg at bedtime dx agitation due to dementia  - Check CBC, CMP, TSH, Mg level on 1/29/25 dx agitation due to dementia  - Check LDL direct, triglycerides, HDL, cholesterol on 1/29/25 dx " hyperlipidemia         Electronically signed by: ERNST Valenzuela CNP        agitation due to dementia  - Check LDL direct, triglycerides, HDL, cholesterol on 1/29/25 dx hyperlipidemia         Electronically signed by: ERNST Valenzuela CNP

## 2025-01-22 NOTE — LETTER
1/22/2025      Mildred Connelly  C/o Gaye Vuong  [address] [address]        No notes on file      Sincerely,        RENST Valenzuela CNP    Electronically signed   reviewed by me today.    Most Recent 3 CBC's:  Recent Labs   Lab Test 03/20/24  1152 02/13/24  0712 02/12/24  0707   WBC 7.9 7.7 9.2   HGB 13.8 9.3* 9.9*   * 97 97    202 208     Most Recent 3 BMP's:  Recent Labs   Lab Test 12/04/24  1332 07/24/24  1050 04/10/24  1118    143 142   POTASSIUM 4.3 3.8 4.0   CHLORIDE 103 103 102   CO2 31* 29 29   BUN 25.5* 18.4 20.3   CR 0.71 0.73 0.73   ANIONGAP 8 11 11   EV 9.2 9.4 9.7   GLC 93 76 80     Liver Function Studies -   Recent Labs   Lab Test 04/10/24  1118   PROTTOTAL 7.5   ALBUMIN 4.4   BILITOTAL 0.6   ALKPHOS 97   AST 16   ALT 20       TSH   Date Value Ref Range Status   04/10/2024 1.64 0.30 - 4.20 uIU/mL Final           Assessment/Plan:    (N30.00) Acute cystitis without hematuria  (primary encounter diagnosis)  Comment: Acute UTI, UC + 10-50K Streptococcus agalctiae. Mild bacterial growth, unclear if symptomatic secondary to dementia. Decision to treat based on increase in behaviors and recent fall to see if behaviors improve following course of antibiotics.   Plan:   - Course of keflex to be completed on 1/23/25    (G30.0,  F02.C11) Severe early onset Alzheimer's dementia with agitation (H)  Comment: Severe Alzheimer's dementia with hx agitation, paranoia, hallucinations. SLUMS 3/30 (11/2023).   Plan:  - Check CBC, CMP, TSH, Mg level on 1/29/25 dx agitation due to dementia  - Increase depakote to 125mg qAM and 250mg at bedtime dx agitation due to dementia  - Continue melatonin, seroquel, lexapro   - Continue aricept, namenda  - Monitor changes in mood or behaviors  - Continue supportive services at [facility] memory care unit  - Patient to follow-up with Neurology as directed  UPDATE:  - Reviewed patient status and treatment plan with Dayana (daughter).     (E78.2) Mixed hyperlipidemia  Comment: Chronic, not on a statin   Plan:   - Check LDL direct, triglycerides, HDL, cholesterol on 1/29/25 dx hyperlipidemia       Orders:  - Increase depakote to 125mg qAM  and 250mg at bedtime dx agitation due to dementia  - Check CBC, CMP, TSH, Mg level on 25 dx agitation due to dementia  - Check LDL direct, triglycerides, HDL, cholesterol on 25 dx hyperlipidemia         Electronically signed by: ERNST Valenzuela CNP              [facility]   2025     Name: Mildred Connelly   : 1950       Orders:  - Increase depakote to 125mg qAM and 250mg at bedtime dx agitation due to dementia  - Check CBC, CMP, TSH, Mg level on 25 dx agitation due to dementia  - Check LDL direct, triglycerides, HDL, cholesterol on 25 dx hyperlipidemia         Electronically signed by   ERNST Valenzuela CNP on 2025 at 10:01 AM          Sincerely,        ERNST Valenzuela CNP    Electronically signed

## 2025-01-23 PROBLEM — G30.0 SEVERE EARLY ONSET ALZHEIMER'S DEMENTIA WITH AGITATION (H): Status: ACTIVE | Noted: 2025-01-23

## 2025-01-23 PROBLEM — F02.C11 SEVERE EARLY ONSET ALZHEIMER'S DEMENTIA WITH AGITATION (H): Status: ACTIVE | Noted: 2025-01-23

## 2025-01-23 RX ORDER — DIVALPROEX SODIUM 125 MG/1
TABLET, DELAYED RELEASE ORAL
Qty: 90 TABLET | Refills: 11 | Status: SHIPPED | OUTPATIENT
Start: 2025-01-23

## 2025-01-23 NOTE — PROGRESS NOTES
[facility]   2025     Name: Mildred Connelly   : 1950       Orders:  - Increase depakote to 125mg qAM and 250mg at bedtime dx agitation due to dementia  - Check CBC, CMP, TSH, Mg level on 25 dx agitation due to dementia  - Check LDL direct, triglycerides, HDL, cholesterol on 25 dx hyperlipidemia         Electronically signed by   ERNST Valenzuela CNP on 2025 at 10:01 AM

## 2025-01-26 ENCOUNTER — LAB REQUISITION (OUTPATIENT)
Dept: LAB | Facility: CLINIC | Age: 75
End: 2025-01-26
Payer: MEDICARE

## 2025-01-26 ENCOUNTER — HEALTH MAINTENANCE LETTER (OUTPATIENT)
Age: 75
End: 2025-01-26

## 2025-01-26 DIAGNOSIS — E78.5 HYPERLIPIDEMIA, UNSPECIFIED: ICD-10-CM

## 2025-01-26 DIAGNOSIS — F03.911 UNSPECIFIED DEMENTIA, UNSPECIFIED SEVERITY, WITH AGITATION (H): ICD-10-CM

## 2025-01-26 DIAGNOSIS — E83.41 HYPERMAGNESEMIA: ICD-10-CM

## 2025-01-27 ENCOUNTER — ASSISTED LIVING VISIT (OUTPATIENT)
Dept: GERIATRICS | Facility: CLINIC | Age: 75
End: 2025-01-27
Payer: MEDICARE

## 2025-01-27 VITALS
RESPIRATION RATE: 20 BRPM | OXYGEN SATURATION: 96 % | WEIGHT: 156.4 LBS | DIASTOLIC BLOOD PRESSURE: 84 MMHG | HEART RATE: 74 BPM | HEIGHT: 67 IN | SYSTOLIC BLOOD PRESSURE: 135 MMHG | TEMPERATURE: 97.3 F | BODY MASS INDEX: 24.55 KG/M2

## 2025-01-27 DIAGNOSIS — G30.0 SEVERE EARLY ONSET ALZHEIMER'S DEMENTIA WITH AGITATION (H): Primary | ICD-10-CM

## 2025-01-27 DIAGNOSIS — N30.00 ACUTE CYSTITIS WITHOUT HEMATURIA: ICD-10-CM

## 2025-01-27 DIAGNOSIS — F02.C11 SEVERE EARLY ONSET ALZHEIMER'S DEMENTIA WITH AGITATION (H): Primary | ICD-10-CM

## 2025-01-27 PROCEDURE — 99348 HOME/RES VST EST LOW MDM 30: CPT | Performed by: INTERNAL MEDICINE

## 2025-01-27 NOTE — LETTER
" 1/27/2025      Mildred Connelly  C/o Gaye Vuong  [address] [facility] [address]        [facility] GERIATRICS    Chief Complaint   Patient presents with     RECHECK     HPI:  Mildred Connelly is a 74 year old  (1950), who is being seen today for an episodic care visit at: [facility])(FGS) [[id number]].     Course reviewed with facility staff and NP    Patient has exhibited worsening agitation over the last several weeks.  Depakote dose was recently increased.  She has completed a course of Keflex for a UTI. (Urine culture grew strep species)  Patient is unable to contribute significant to the history secondary to cognitive impairment  She denies any specific physical concerns including dysuria.  She denies pain in general.        Allergies, and PMH/PSH reviewed in [facility] today.      Objective:   /84   Pulse 74   Temp 97.3  F (36.3  C)   Resp 20   Ht 1.702 m (5' 7\")   Wt 70.9 kg (156 lb 6.4 oz)   SpO2 96%   BMI 24.50 kg/m      Pleasant, well-appearing female, sitting in the common area, preparing to participate in a group activity  She is oriented to self  Lungs clear  CV RRR  Abdomen soft  No extremity edema  No tremors.    Most Recent 3 CBC's:  Recent Labs   Lab Test 03/20/24  1152 02/13/24  0712 02/12/24  0707   WBC 7.9 7.7 9.2   HGB 13.8 9.3* 9.9*   * 97 97    202 208     Most Recent 3 BMP's:  Recent Labs   Lab Test 12/04/24  1332 07/24/24  1050 04/10/24  1118    143 142   POTASSIUM 4.3 3.8 4.0   CHLORIDE 103 103 102   CO2 31* 29 29   BUN 25.5* 18.4 20.3   CR 0.71 0.73 0.73   ANIONGAP 8 11 11   EV 9.2 9.4 9.7   GLC 93 76 80     7-Day Micro Results       No results found for the last 168 hours.            Assessment/Plan:    Dementia, recent increase in agitation unclear if related to urinary tract infection.   No current  symptoms the patient is not a reliable historian  Depakote dose was recently increased.  She remains on quetiapine  Plan: Monitor " effective increase Depakote.  Continue quetiapine.  Patient remains on Aricept, Lexapro, memantine monitor vital signs.        Dave Kelly MD        Sincerely,        Dave Kelly MD    Electronically signed

## 2025-01-27 NOTE — PROGRESS NOTES
"[facility] GERIATRICS    Chief Complaint   Patient presents with    RECHECK     HPI:  Mildred Connelly is a 74 year old  (1950), who is being seen today for an episodic care visit at: [facility])(FGS) [[id number]].     Course reviewed with facility staff and NP    Patient has exhibited worsening agitation over the last several weeks.  Depakote dose was recently increased.  She has completed a course of Keflex for a UTI. (Urine culture grew strep species)  Patient is unable to contribute significant to the history secondary to cognitive impairment  She denies any specific physical concerns including dysuria.  She denies pain in general.        Allergies, and PMH/PSH reviewed in EPIC today.      Objective:   /84   Pulse 74   Temp 97.3  F (36.3  C)   Resp 20   Ht 1.702 m (5' 7\")   Wt 70.9 kg (156 lb 6.4 oz)   SpO2 96%   BMI 24.50 kg/m      Pleasant, well-appearing female, sitting in the common area, preparing to participate in a group activity  She is oriented to self  Lungs clear  CV RRR  Abdomen soft  No extremity edema  No tremors.    Most Recent 3 CBC's:  Recent Labs   Lab Test 03/20/24  1152 02/13/24  0712 02/12/24  0707   WBC 7.9 7.7 9.2   HGB 13.8 9.3* 9.9*   * 97 97    202 208     Most Recent 3 BMP's:  Recent Labs   Lab Test 12/04/24  1332 07/24/24  1050 04/10/24  1118    143 142   POTASSIUM 4.3 3.8 4.0   CHLORIDE 103 103 102   CO2 31* 29 29   BUN 25.5* 18.4 20.3   CR 0.71 0.73 0.73   ANIONGAP 8 11 11   EV 9.2 9.4 9.7   GLC 93 76 80     7-Day Micro Results       No results found for the last 168 hours.            Assessment/Plan:    Dementia, recent increase in agitation unclear if related to urinary tract infection.   No current  symptoms the patient is not a reliable historian  Depakote dose was recently increased.  She remains on quetiapine  Plan: Monitor effective increase Depakote.  Continue quetiapine.  Patient remains on Aricept, Lexapro, memantine monitor " vital signs.        Dave Kelly MD

## 2025-01-28 ENCOUNTER — LAB REQUISITION (OUTPATIENT)
Dept: LAB | Facility: CLINIC | Age: 75
End: 2025-01-28
Payer: MEDICARE

## 2025-01-28 DIAGNOSIS — E78.5 HYPERLIPIDEMIA, UNSPECIFIED: ICD-10-CM

## 2025-01-28 DIAGNOSIS — E83.41 HYPERMAGNESEMIA: ICD-10-CM

## 2025-01-28 DIAGNOSIS — F03.911 UNSPECIFIED DEMENTIA, UNSPECIFIED SEVERITY, WITH AGITATION (H): ICD-10-CM

## 2025-01-29 LAB
ALBUMIN SERPL BCG-MCNC: 4.4 G/DL (ref 3.5–5.2)
ALP SERPL-CCNC: 95 U/L (ref 40–150)
ALT SERPL W P-5'-P-CCNC: 21 U/L (ref 0–50)
ANION GAP SERPL CALCULATED.3IONS-SCNC: 13 MMOL/L (ref 7–15)
AST SERPL W P-5'-P-CCNC: 19 U/L (ref 0–45)
BILIRUB SERPL-MCNC: 0.7 MG/DL
BUN SERPL-MCNC: 13.5 MG/DL (ref 8–23)
CALCIUM SERPL-MCNC: 10.4 MG/DL (ref 8.8–10.4)
CHLORIDE SERPL-SCNC: 101 MMOL/L (ref 98–107)
CHOLEST SERPL-MCNC: 227 MG/DL
CHOLEST SERPL-MCNC: 227 MG/DL
CREAT SERPL-MCNC: 0.7 MG/DL (ref 0.51–0.95)
EGFRCR SERPLBLD CKD-EPI 2021: >90 ML/MIN/1.73M2
ERYTHROCYTE [DISTWIDTH] IN BLOOD BY AUTOMATED COUNT: 12.1 % (ref 10–15)
FASTING STATUS PATIENT QL REPORTED: NO
GLUCOSE SERPL-MCNC: 97 MG/DL (ref 70–99)
HCO3 SERPL-SCNC: 28 MMOL/L (ref 22–29)
HCT VFR BLD AUTO: 44.9 % (ref 35–47)
HDLC SERPL-MCNC: 49 MG/DL
HDLC SERPL-MCNC: 49 MG/DL
HGB BLD-MCNC: 14.1 G/DL (ref 11.7–15.7)
LDLC SERPL CALC-MCNC: 152 MG/DL
LDLC SERPL DIRECT ASSAY-MCNC: 146 MG/DL
MAGNESIUM SERPL-MCNC: 2.3 MG/DL (ref 1.7–2.3)
MCH RBC QN AUTO: 31.5 PG (ref 26.5–33)
MCHC RBC AUTO-ENTMCNC: 31.4 G/DL (ref 31.5–36.5)
MCV RBC AUTO: 100 FL (ref 78–100)
NONHDLC SERPL-MCNC: 178 MG/DL
PLATELET # BLD AUTO: 240 10E3/UL (ref 150–450)
POTASSIUM SERPL-SCNC: 4.1 MMOL/L (ref 3.4–5.3)
PROT SERPL-MCNC: 7.6 G/DL (ref 6.4–8.3)
RBC # BLD AUTO: 4.48 10E6/UL (ref 3.8–5.2)
SODIUM SERPL-SCNC: 142 MMOL/L (ref 135–145)
TRIGL SERPL-MCNC: 131 MG/DL
TRIGL SERPL-MCNC: 131 MG/DL
TSH SERPL DL<=0.005 MIU/L-ACNC: 1.39 UIU/ML (ref 0.3–4.2)
WBC # BLD AUTO: 8.4 10E3/UL (ref 4–11)

## 2025-01-29 PROCEDURE — 36415 COLL VENOUS BLD VENIPUNCTURE: CPT | Mod: ORL | Performed by: NURSE PRACTITIONER

## 2025-01-29 PROCEDURE — P9604 ONE-WAY ALLOW PRORATED TRIP: HCPCS | Mod: ORL | Performed by: NURSE PRACTITIONER

## 2025-01-29 PROCEDURE — 85027 COMPLETE CBC AUTOMATED: CPT | Mod: ORL | Performed by: NURSE PRACTITIONER

## 2025-01-29 PROCEDURE — 80053 COMPREHEN METABOLIC PANEL: CPT | Mod: ORL | Performed by: NURSE PRACTITIONER

## 2025-01-29 PROCEDURE — 83721 ASSAY OF BLOOD LIPOPROTEIN: CPT | Mod: ORL | Performed by: NURSE PRACTITIONER

## 2025-01-29 PROCEDURE — 83735 ASSAY OF MAGNESIUM: CPT | Mod: ORL | Performed by: NURSE PRACTITIONER

## 2025-01-29 PROCEDURE — 80061 LIPID PANEL: CPT | Mod: ORL | Performed by: NURSE PRACTITIONER

## 2025-01-29 PROCEDURE — 84443 ASSAY THYROID STIM HORMONE: CPT | Mod: ORL | Performed by: NURSE PRACTITIONER

## 2025-01-30 ENCOUNTER — TELEPHONE (OUTPATIENT)
Dept: GERIATRICS | Facility: CLINIC | Age: 75
End: 2025-01-30
Payer: MEDICARE

## 2025-01-30 ENCOUNTER — MYC MEDICAL ADVICE (OUTPATIENT)
Dept: GERIATRICS | Facility: CLINIC | Age: 75
End: 2025-01-30
Payer: MEDICARE

## 2025-01-30 RX ORDER — ATORVASTATIN CALCIUM 20 MG/1
20 TABLET, FILM COATED ORAL DAILY
COMMUNITY

## 2025-01-30 NOTE — TELEPHONE ENCOUNTER
----- Message from Marie High sent at 1/30/2025  2:02 PM CST -----  Add lipitor 20mg at bedtime Dx hyperlipidemia

## 2025-02-11 VITALS — HEIGHT: 67 IN | BODY MASS INDEX: 24.55 KG/M2 | WEIGHT: 156.4 LBS

## 2025-02-11 NOTE — PROGRESS NOTES
"[facility] GERIATRICS    Chief Complaint   Patient presents with    RECHECK     HPI:  Mildred Connelly is a 74 year old  (1950), who is being seen today for an episodic care visit at: [facility] ([facility])(FGS) [[id number]].       Today's concern is: ***        Allergies, and PMH/PSH reviewed in [facility] today.    REVIEW OF SYSTEMS:  Limited secondary to cognitive impairment but today pt reports no concerns      Objective:   Ht 1.702 m (5' 7\")   Wt 70.9 kg (156 lb 6.4 oz)   BMI 24.50 kg/m    {Nursing home physical exam :546801}    Wt Readings from Last 4 Encounters:   02/11/25 70.9 kg (156 lb 6.4 oz)   01/27/25 70.9 kg (156 lb 6.4 oz)   01/22/25 70.9 kg (156 lb 6.4 oz)   01/15/25 70.9 kg (156 lb 6.4 oz)       Recent labs in [facility] reviewed by me today.   Most Recent 3 CBC's:  Recent Labs   Lab Test 01/29/25  0948 03/20/24  1152 02/13/24  0712   WBC 8.4 7.9 7.7   HGB 14.1 13.8 9.3*    101* 97    219 202     Most Recent 3 BMP's:  Recent Labs   Lab Test 01/29/25  0948 12/04/24  1332 07/24/24  1050    142 143   POTASSIUM 4.1 4.3 3.8   CHLORIDE 101 103 103   CO2 28 31* 29   BUN 13.5 25.5* 18.4   CR 0.70 0.71 0.73   ANIONGAP 13 8 11   EV 10.4 9.2 9.4   GLC 97 93 76     Liver Function Studies -   Recent Labs   Lab Test 01/29/25  0948   PROTTOTAL 7.6   ALBUMIN 4.4   BILITOTAL 0.7   ALKPHOS 95   AST 19   ALT 21       TSH   Date Value Ref Range Status   01/29/2025 1.39 0.30 - 4.20 uIU/mL Final       Recent Labs   Lab Test 01/29/25  0948   CHOL 227*  227*   HDL 49*  49*   *  146*   TRIG 131  131         Assessment/Plan:    {FGS DX2:149657}        Orders:  - Discontinue current seroquel orders  - Add seroquel 12.5mg every day at 11AM x 1 week, then discontinue   - Add seroquel 50mg at bedtime dx agitation due to dementia  - Add seroquel 25mg every day PRN dx agitation due to dementia         Electronically signed by: ERNST Valenzuela CNP       " Range Status   01/29/2025 1.39 0.30 - 4.20 uIU/mL Final       Recent Labs   Lab Test 01/29/25  0948   CHOL 227*  227*   HDL 49*  49*   *  146*   TRIG 131  131         Assessment/Plan:    (G30.0,  F02.C11) 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 Recent increase in agitation, restlessness, insomnia, daytime sleepiness. Ongoing physical deconditioning, ambulates short distances w/walker. SLUMS 3/30 (11/2023).   Plan:   - Discontinue current seroquel orders  - Add seroquel 12.5mg every day at 11AM x 1 week, then discontinue   - Add seroquel 50mg at bedtime dx agitation due to dementia  - Add seroquel 25mg every day PRN dx agitation due to dementia   - Continue depakote 250mg ER at bedtime, lexapro  - Continue aricept, namenda  - Continue tylenol BID and BID PRN  - Monitor changes in mood or behaviors  - Continue supportive services at [facility] memory care unit  - Patient to follow-up with Neurology as directed  - Reviewed patient status and treatment plan with Dayana (daughter).     (E78.2) Mixed hyperlipidemia  Comment: Chronic  Plan:   - Continue lipitor 20mg every day, started on 2/1/25  - Check lipid panel in 3-4 months      Orders:  - Discontinue current seroquel orders  - Add seroquel 12.5mg every day at 11AM x 1 week, then discontinue   - Add seroquel 50mg at bedtime dx agitation due to dementia  - Add seroquel 25mg every day PRN dx agitation due to dementia         Electronically signed by: ERNST Valenzuela CNP

## 2025-02-12 ENCOUNTER — ASSISTED LIVING VISIT (OUTPATIENT)
Dept: GERIATRICS | Facility: CLINIC | Age: 75
End: 2025-02-12
Payer: MEDICARE

## 2025-02-12 DIAGNOSIS — G47.00 INSOMNIA, UNSPECIFIED TYPE: ICD-10-CM

## 2025-02-12 DIAGNOSIS — F03.94 ANXIETY DUE TO DEMENTIA (H): ICD-10-CM

## 2025-02-12 DIAGNOSIS — E78.2 MIXED HYPERLIPIDEMIA: ICD-10-CM

## 2025-02-12 DIAGNOSIS — R53.81 PHYSICAL DECONDITIONING: ICD-10-CM

## 2025-02-12 DIAGNOSIS — G30.0 SEVERE EARLY ONSET ALZHEIMER'S DEMENTIA WITH AGITATION (H): Primary | ICD-10-CM

## 2025-02-12 DIAGNOSIS — F02.C11 SEVERE EARLY ONSET ALZHEIMER'S DEMENTIA WITH AGITATION (H): Primary | ICD-10-CM

## 2025-02-12 NOTE — LETTER
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 Recent increase in agitation, restlessness, insomnia, daytime sleepiness. Ongoing physical deconditioning, ambulates short distances w/walker. SLUMS 3/30 (11/2023).   Plan:   - Discontinue current seroquel orders  - Add seroquel 12.5mg every day at 11AM x 1 week, then discontinue   - Add seroquel 50mg at bedtime dx agitation due to dementia  - Add seroquel 25mg every day PRN dx agitation due to dementia   - Continue depakote 250mg ER at bedtime, lexapro  - Continue aricept, namenda  - Continue tylenol BID and BID PRN  - Monitor changes in mood or behaviors  - Continue supportive services at [facility] memory care unit  - Patient to follow-up with Neurology as directed  - Reviewed patient status and treatment plan with Dayana (daughter).     (E78.2) Mixed hyperlipidemia  Comment: Chronic  Plan:   - Continue lipitor 20mg every day, started on 2/1/25  - Check lipid panel in 3-4 months      Orders:  - Discontinue current seroquel orders  - Add seroquel 12.5mg every day at 11AM x 1 week, then discontinue   - Add seroquel 50mg at bedtime dx agitation due to dementia  - Add seroquel 25mg every day PRN dx agitation due to dementia         Electronically signed by: ERNST Valenzuela CNP           Sincerely,        ERNST Valenzuela CNP    Electronically signed

## 2025-02-13 RX ORDER — QUETIAPINE FUMARATE 25 MG/1
TABLET, FILM COATED ORAL
Qty: 60 TABLET | Refills: 11 | Status: SHIPPED | OUTPATIENT
Start: 2025-02-13

## 2025-02-13 NOTE — PATIENT INSTRUCTIONS
[facility] Geriatrics   2025     Name: Mildred Connelly   : 1950       Orders:  - Discontinue current seroquel orders  - Add seroquel 12.5mg every day at 11AM x 1 week, then discontinue   - Add seroquel 50mg at bedtime dx agitation due to dementia  - Add seroquel 25mg every day PRN dx agitation due to dementia         Electronically signed by  ERNST Valenzuela CNP on 2025 at 2:46 PM

## 2025-02-22 DIAGNOSIS — G30.9 MAJOR NEUROCOGNITIVE DISORDER DUE TO ALZHEIMER'S DISEASE (H): ICD-10-CM

## 2025-02-22 DIAGNOSIS — E78.2 MIXED HYPERLIPIDEMIA: Primary | ICD-10-CM

## 2025-02-22 DIAGNOSIS — F02.80 MAJOR NEUROCOGNITIVE DISORDER DUE TO ALZHEIMER'S DISEASE (H): ICD-10-CM

## 2025-02-24 RX ORDER — QUETIAPINE FUMARATE 50 MG/1
50 TABLET, FILM COATED ORAL AT BEDTIME
Qty: 90 TABLET | Refills: 97 | Status: SHIPPED | OUTPATIENT
Start: 2025-02-24

## 2025-02-24 RX ORDER — ATORVASTATIN CALCIUM 20 MG/1
20 TABLET, FILM COATED ORAL AT BEDTIME
Qty: 90 TABLET | Refills: 97 | Status: SHIPPED | OUTPATIENT
Start: 2025-02-24

## 2025-02-27 NOTE — PLAN OF CARE
Occupational Therapy: Orders received. Chart reviewed and discussed with care team, including IP PT. Chart indicates plans for TCU vs return to . Per discussion with IP PT, both disciplines not needed while inpatient and all acute therapy needs are being met with IP PT. Will defer therapy recommendations to IP PT. Will defer OT to next level of care. Thank you for this consult and please re-consult if needed. Will complete orders.      93

## 2025-03-03 VITALS — WEIGHT: 156.4 LBS | HEIGHT: 67 IN | BODY MASS INDEX: 24.55 KG/M2

## 2025-03-03 RX ORDER — QUETIAPINE FUMARATE 25 MG/1
25 TABLET, FILM COATED ORAL DAILY PRN
COMMUNITY

## 2025-03-03 NOTE — PROGRESS NOTES
"[facility] GERIATRICS    Chief Complaint   Patient presents with    RECHECK    Wellness Visit     HPI:  Mildred Connelly is a 74 year old  (1950), who is being seen today for an episodic care visit at: [facility])(FGS) [[id number]].       Today's concern is: ***        Allergies, and PMH/PSH reviewed in [facility] today.    REVIEW OF SYSTEMS:  {dwdwto51:010235}      Objective:   Ht 1.702 m (5' 7\")   Wt 70.9 kg (156 lb 6.4 oz)   BMI 24.50 kg/m    {Nursing home physical exam :328680}      Recent Labs:  Most Recent 3 CBC's:  Recent Labs   Lab Test 01/29/25  0948 03/20/24  1152 02/13/24  0712   WBC 8.4 7.9 7.7   HGB 14.1 13.8 9.3*    101* 97    219 202     Most Recent 3 BMP's:  Recent Labs   Lab Test 01/29/25  0948 12/04/24  1332 07/24/24  1050    142 143   POTASSIUM 4.1 4.3 3.8   CHLORIDE 101 103 103   CO2 28 31* 29   BUN 13.5 25.5* 18.4   CR 0.70 0.71 0.73   ANIONGAP 13 8 11   EV 10.4 9.2 9.4   GLC 97 93 76         Assessment/Plan:    {FGS DX2:091312}    - Reviewed patient status and treatment plan with Dayana (daughter).         Orders:  1. Need for shingles vaccine (Primary)  -  Administer zoster vaccine recombinant adjuvanted (SHINGRIX) injection; Inject 0.5 mLs into the muscle once for 1 dose. RN staff to administer 2nd dose in 3 months  Dispense: 1 each; Refill: 1    2. Need for pneumococcal vaccination  - Administer pneumococcal (PREVNAR 20) 0.5 ML NAPOLEON injection; Inject 0.5 mLs into the muscle once for 1 dose.  Dispense: 0.5 mL; Refill: 0        Electronically signed by: ERNST Valenzuela CNP         Preventive Care Visit  [facility] GERIATRIC SERVICES  ERNST Valenzuela CNP, Family Medicine  Mar 4, 2025      {PROVIDER CHARTING PREFERENCE:505035}      Subjective   Mildred is a 74 year old, presenting for the following:  RECHECK and Wellness Visit      HPI  ***       Advance Care Planning  Patient has a Health Care Directive on file  Advance care planning document " is on file and is current.     Today's PHQ-2 Score:        No data to display                   No data to display              Social History     Tobacco Use    Smoking status: Former     Current packs/day: 0.00     Types: Cigarettes     Quit date:      Years since quittin.2    Smokeless tobacco: Never   Substance Use Topics    Alcohol use: Not Currently    Drug use: Never        ASCVD Risk   The 10-year ASCVD risk score (Cristy SUBRAMANIAN, et al., 2019) is: 13.4%    Values used to calculate the score:      Age: 74 years      Sex: Female      Is Non- : No      Diabetic: No      Tobacco smoker: No      Systolic Blood Pressure: 135 mmHg      Is BP treated: No      HDL Cholesterol: 49 mg/dL      Total Cholesterol: 227 mg/dL      Reviewed and updated as needed this visit by Provider                  Current providers sharing in care for this patient include:  Patient Care Team:  Marie High APRN CNP as PCP - General (Family Medicine)  [facility] (Fgs), Dave Min MD as Hospitalist (Internal Medicine)  Olivia Stephens as Other (see comments)  Marie High APRN CNP as Assigned PCP    The following health maintenance items are reviewed in Epic and correct as of today:  Health Maintenance   Topic Date Due    ZOSTER IMMUNIZATION (1 of 2) Never done    COLORECTAL CANCER SCREENING  2019    MAMMO SCREENING  2023    MEDICARE ANNUAL WELLNESS VISIT  2024    PHQ-2 (once per calendar year)  Never done    COVID-19 Vaccine ( season) 2025    LIPID  2026    FALL RISK ASSESSMENT  2026    GLUCOSE  2028    DTAP/TDAP/TD IMMUNIZATION (2 - Td or Tdap) 2028    ADVANCE CARE PLANNING  2029    DEXA  2034    HEPATITIS C SCREENING  Completed    INFLUENZA VACCINE  Completed    Pneumococcal Vaccine: 50+ Years  Completed    RSV VACCINE  Completed    HPV IMMUNIZATION  Aged Out    MENINGITIS  "IMMUNIZATION  Aged Out          Objective    Exam  Ht 1.702 m (5' 7\")   Wt 70.9 kg (156 lb 6.4 oz)   BMI 24.50 kg/m     Estimated body mass index is 24.5 kg/m  as calculated from the following:    Height as of this encounter: 1.702 m (5' 7\").    Weight as of this encounter: 70.9 kg (156 lb 6.4 oz).      Physical Exam  See above         3/4/2025   Mini Cog   Mini-Cog Not Completed (choose reason) Known dementia         Signed Electronically by: ERNST Valenzuela CNP  " unable to answer secondary to dementia  If you drink alcohol do you typically have >3 drinks per day or >7 drinks per week? No  Do you have a current opioid prescription? No  Do you use any other controlled substances or medications that are not prescribed by a provider? None  Social History     Tobacco Use    Smoking status: Former     Current packs/day: 0.00     Types: Cigarettes     Quit date:      Years since quittin.2    Smokeless tobacco: Never   Substance Use Topics    Alcohol use: Not Currently    Drug use: Never       Needs assistance for the following daily activities: (!) TELEPHONE USE, (!) TRANSPORTATION, (!) SHOPPING, (!) PREPARING MEALS, (!) HOUSEWORK, (!) BATHING, (!) LAUNDRY, (!) MONEY MANAGEMENT, and (!) DRESSING  Which of the following safety concerns are present in your home?  none identified   Do you (or your family members) have any concerns about your safety while driving?  N/A; not driving d/t dementia  Do you have any of the following hearing concerns?: No hearing concerns  In the past 6 months, have you been bothered by leaking of urine? No           3/4/2025   Fall Risk   Fallen 2 or more times in the past year? No   Trouble with walking or balance? Yes   Reason Gait Speed Test Not Completed --        Today's PHQ-2 Score: Patient unable to participate secondary to dementia       No data to display                   ASCVD Risk   The 10-year ASCVD risk score (Cristy SUBRAMANIAN, et al., 2019) is: 14.9%    Values used to calculate the score:      Age: 74 years      Sex: Female      Is Non- : No      Diabetic: No      Tobacco smoker: No      Systolic Blood Pressure: 143 mmHg      Is BP treated: No      HDL Cholesterol: 49 mg/dL      Total Cholesterol: 227 mg/dL      Reviewed and updated as needed this visit by Provider     Meds  Problems  Med Hx  Surg Hx    Sexual Activity          Current providers sharing in care for this patient include:  Patient Care  Team:  Marie High APRN CNP as PCP - General (Family Medicine)  detention (Fgs), Jose De Jesus Kelly, Dave Gan MD as Hospitalist (Internal Medicine)  Olivia Stephens as Other (see comments)  Marie High APRN CNP as Assigned PCP    The following health maintenance items are reviewed in Epic and correct as of today:  Health Maintenance   Topic Date Due    ZOSTER IMMUNIZATION (1 of 2) Never done    COLORECTAL CANCER SCREENING  08/23/2019    MAMMO SCREENING  05/26/2023    COVID-19 Vaccine (7 - 2024-25 season) 04/08/2025    LIPID  01/29/2026    MEDICARE ANNUAL WELLNESS VISIT  03/04/2026    FALL RISK ASSESSMENT  03/04/2026    DIABETES SCREENING  01/29/2028    DTAP/TDAP/TD IMMUNIZATION (2 - Td or Tdap) 08/25/2028    ADVANCE CARE PLANNING  03/04/2030    DEXA  09/13/2034    HEPATITIS C SCREENING  Completed    PHQ-2 (once per calendar year)  Completed    INFLUENZA VACCINE  Completed    Pneumococcal Vaccine: 50+ Years  Completed    RSV VACCINE  Completed    HPV IMMUNIZATION  Aged Out    MENINGITIS IMMUNIZATION  Aged Out       Appropriate preventive services were discussed with this patient, including applicable screening as appropriate for fall prevention, nutrition, physical activity, Tobacco-use cessation, weight loss and cognition.  Checklist reviewing preventive services available has been given to the patient.        3/4/2025   Mini Cog   Mini-Cog Not Completed (choose reason) Known dementia           3/4/2025   Vision Screen   Reason Vision Screen Not Completed Attempted, unable to cooperate             No follow-ups on file.      Lyubov Levy is a 74 year old, presenting for the following:  RECHECK and Wellness Visit      HPI  See above       Advance Care Planning  Patient has a Health Care Directive on file  Advance care planning document is on file and is current.     Today's PHQ-2 Score:        No data to display                   No data to display              Social History      Tobacco Use    Smoking status: Former     Current packs/day: 0.00     Types: Cigarettes     Quit date:      Years since quittin.2    Smokeless tobacco: Never   Substance Use Topics    Alcohol use: Not Currently    Drug use: Never        ASCVD Risk   The 10-year ASCVD risk score (Cristy SUBRAMANIAN, et al., 2019) is: 14.9%    Values used to calculate the score:      Age: 74 years      Sex: Female      Is Non- : No      Diabetic: No      Tobacco smoker: No      Systolic Blood Pressure: 143 mmHg      Is BP treated: No      HDL Cholesterol: 49 mg/dL      Total Cholesterol: 227 mg/dL      Reviewed and updated as needed this visit by Provider     Meds  Problems  Med Hx  Surg Hx    Sexual Activity        Current providers sharing in care for this patient include:  Patient Care Team:  Marie High APRN CNP as PCP - General (Family Medicine)  [facility] (s), Dave Min MD as Hospitalist (Internal Medicine)  Olivia Stephens as Other (see comments)  Marie High APRN CNP as Assigned PCP    The following health maintenance items are reviewed in Epic and correct as of today:  Health Maintenance   Topic Date Due    ZOSTER IMMUNIZATION (1 of 2) Never done    COLORECTAL CANCER SCREENING  2019    MAMMO SCREENING  2023    COVID-19 Vaccine (7 - 2024-25 season) 2025    LIPID  2026    MEDICARE ANNUAL WELLNESS VISIT  2026    FALL RISK ASSESSMENT  2026    DIABETES SCREENING  2028    DTAP/TDAP/TD IMMUNIZATION (2 - Td or Tdap) 2028    ADVANCE CARE PLANNING  2030    DEXA  2034    HEPATITIS C SCREENING  Completed    PHQ-2 (once per calendar year)  Completed    INFLUENZA VACCINE  Completed    Pneumococcal Vaccine: 50+ Years  Completed    RSV VACCINE  Completed    HPV IMMUNIZATION  Aged Out    MENINGITIS IMMUNIZATION  Aged Out          Objective    Exam  BP (!) 143/84   Pulse 70   Temp 97.1  " F (36.2  C)   Resp 16   Ht 1.702 m (5' 7\")   Wt 70.9 kg (156 lb 6.4 oz)   SpO2 94%   BMI 24.50 kg/m     Estimated body mass index is 24.5 kg/m  as calculated from the following:    Height as of this encounter: 1.702 m (5' 7\").    Weight as of this encounter: 70.9 kg (156 lb 6.4 oz).      Physical Exam  See above         3/4/2025   Mini Cog   Mini-Cog Not Completed (choose reason) Known dementia         Signed Electronically by: ERNST Valenzuela CNP  "

## 2025-03-04 ENCOUNTER — ASSISTED LIVING VISIT (OUTPATIENT)
Dept: GERIATRICS | Facility: CLINIC | Age: 75
End: 2025-03-04
Payer: MEDICARE

## 2025-03-04 DIAGNOSIS — Z23 NEED FOR SHINGLES VACCINE: Primary | ICD-10-CM

## 2025-03-04 DIAGNOSIS — Z23 NEED FOR PNEUMOCOCCAL VACCINATION: ICD-10-CM

## 2025-03-04 DIAGNOSIS — Z23 NEED FOR SHINGLES VACCINE: ICD-10-CM

## 2025-03-04 DIAGNOSIS — F03.94 ANXIETY DUE TO DEMENTIA (H): ICD-10-CM

## 2025-03-04 DIAGNOSIS — G47.00 INSOMNIA, UNSPECIFIED TYPE: ICD-10-CM

## 2025-03-04 DIAGNOSIS — F02.C11 SEVERE EARLY ONSET ALZHEIMER'S DEMENTIA WITH AGITATION (H): Primary | ICD-10-CM

## 2025-03-04 DIAGNOSIS — Z00.00 ENCOUNTER FOR MEDICARE ANNUAL WELLNESS EXAM: ICD-10-CM

## 2025-03-04 DIAGNOSIS — G30.0 SEVERE EARLY ONSET ALZHEIMER'S DEMENTIA WITH AGITATION (H): Primary | ICD-10-CM

## 2025-03-04 DIAGNOSIS — R53.81 PHYSICAL DECONDITIONING: ICD-10-CM

## 2025-03-04 PROCEDURE — G0438 PPPS, INITIAL VISIT: HCPCS | Performed by: NURSE PRACTITIONER

## 2025-03-04 PROCEDURE — 90750 HZV VACC RECOMBINANT IM: CPT | Performed by: NURSE PRACTITIONER

## 2025-03-04 PROCEDURE — 99349 HOME/RES VST EST MOD MDM 40: CPT | Mod: 25 | Performed by: NURSE PRACTITIONER

## 2025-03-04 PROCEDURE — 90472 IMMUNIZATION ADMIN EACH ADD: CPT | Performed by: NURSE PRACTITIONER

## 2025-03-04 PROCEDURE — G0009 ADMIN PNEUMOCOCCAL VACCINE: HCPCS | Performed by: NURSE PRACTITIONER

## 2025-03-04 PROCEDURE — 90677 PCV20 VACCINE IM: CPT | Performed by: NURSE PRACTITIONER

## 2025-03-04 RX ORDER — ZOSTER VACCINE RECOMBINANT, ADJUVANTED 50 MCG/0.5
1 KIT INTRAMUSCULAR ONCE
Qty: 1 EACH | Refills: 1 | Status: SHIPPED | OUTPATIENT
Start: 2025-03-04 | End: 2025-03-04

## 2025-03-04 NOTE — PATIENT INSTRUCTIONS
[facility] Geriatrics   2025     Name: Mildred Connelly   : 1950       Orders:  1. Need for shingles vaccine (Primary)  -  Administer zoster vaccine recombinant adjuvanted (SHINGRIX) injection; Inject 0.5 mLs into the muscle once for 1 dose. RN staff to administer 2nd dose in 3 months  Dispense: 1 each; Refill: 1    2. Need for pneumococcal vaccination  - Administer pneumococcal (PREVNAR 20) 0.5 ML NAPOLEON injection; Inject 0.5 mLs into the muscle once for 1 dose.  Dispense: 0.5 mL; Refill: 0        Electronically signed by:  ERNST Valenzuela CNP on 3/4/2025 at 3:54 PM       A, and RSV shots: Ask your care team if you need these based on your risk.  Shingles shot (for age 50 and up)  General health tests  Diabetes screening:  Starting at age 35, Get screened for diabetes at least every 3 years.  If you are younger than age 35, ask your care team if you should be screened for diabetes.  Cholesterol test: At age 39, start having a cholesterol test every 5 years, or more often if advised.  Bone density scan (DEXA): At age 50, ask your care team if you should have this scan for osteoporosis (brittle bones).  Hepatitis C: Get tested at least once in your life.  STIs (sexually transmitted infections)  Before age 24: Ask your care team if you should be screened for STIs.  After age 24: Get screened for STIs if you're at risk. You are at risk for STIs (including HIV) if:  You are sexually active with more than one person.  You don't use condoms every time.  You or a partner was diagnosed with a sexually transmitted infection.  If you are at risk for HIV, ask about PrEP medicine to prevent HIV.  Get tested for HIV at least once in your life, whether you are at risk for HIV or not.  Cancer screening tests  Cervical cancer screening: If you have a cervix, begin getting regular cervical cancer screening tests starting at age 21.  Breast cancer scan (mammogram): If you've ever had breasts, begin having regular mammograms starting at age 40. This is a scan to check for breast cancer.  Colon cancer screening: It is important to start screening for colon cancer at age 45.  Have a colonoscopy test every 10 years (or more often if you're at risk) Or, ask your provider about stool tests like a FIT test every year or Cologuard test every 3 years.  To learn more about your testing options, visit:   .  For help making a decision, visit:   https://bit.ly/fq94846.  Prostate cancer screening test: If you have a prostate, ask your care team if a prostate cancer screening test (PSA) at age 55 is right for  you.  Lung cancer screening: If you are a current or former smoker ages 50 to 80, ask your care team if ongoing lung cancer screenings are right for you.  For informational purposes only. Not to replace the advice of your health care provider. Copyright   2023 [facility] [employer]. All rights reserved. Clinically reviewed by the [facility] Transitions Program. [employer] [id number] - REV 01/24.

## 2025-03-04 NOTE — LETTER
3/4/2025      Mildred Connelly  C[address]o [address]ong  [address] [facility] [address]        No notes on file      Sincerely,        ERNST Valenzuela CNP    Electronically signed   13.8 9.3*    101* 97    219 202     Most Recent 3 BMP's:  Recent Labs   Lab Test 01/29/25  0948 12/04/24  1332 07/24/24  1050    142 143   POTASSIUM 4.1 4.3 3.8   CHLORIDE 101 103 103   CO2 28 31* 29   BUN 13.5 25.5* 18.4   CR 0.70 0.71 0.73   ANIONGAP 13 8 11   EV 10.4 9.2 9.4   GLC 97 93 76         Assessment/Plan:    (G30.0,  F02.C11) 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 Behaviors and daytime sleepiness improved w/meaning off daytime seroquel orders. Chronic insomnia, unclear if insomnia has improved. Ongoing physical deconditioning, ambulates short distances w/walker. SLUMS 3/30 (11/2023).   Plan:   - Continue seroquel 50mg at bedtime, 25mg every day PRN  - Continue depakote 250mg ER at bedtime, lexapro  - Continue aricept, namenda  - Continue tylenol BID and BID PRN  - Monitor changes in mood or behaviors  - Continue supportive services at [facility] memory care unit  - Patient to follow-up with Neurology as directed  - Reviewed patient status and treatment plan with Dayana (daughter).     (Z23) Need for shingles vaccine  (Z23) Need for pneumococcal vaccination  Comment: Reviewed immunization record, due for Shingrix vaccine series and Prevnar 20.  Plan:   -  Administer zoster vaccine recombinant adjuvanted (SHINGRIX) injection; Inject 0.5 mLs into the muscle once for 1 dose. RN staff to administer 2nd dose in 3 months  Dispense: 1 each; Refill: 1  - Administer pneumococcal (PREVNAR 20) 0.5 ML NAPOLEON injection; Inject 0.5 mLs into the muscle once for 1 dose.  Dispense: 0.5 mL; Refill: 0  - Reviewed with Dayana (daughter), agrees with plan    (Z00.00) Encounter for Medicare annual wellness exam  Comment: Due for annual Medicare wellness exam  Plan:   - Completed;  see below for more details  - Reviewed patient status and treatment plan with Dayana (daughter).       Orders:  1. Need for shingles vaccine (Primary)  -  Administer zoster vaccine recombinant adjuvanted (SHINGRIX) injection; Inject 0.5 mLs into the muscle once for 1 dose. RN staff to administer 2nd dose in 3 months  Dispense: 1 each; Refill: 1  2. Need for pneumococcal vaccination  - Administer pneumococcal (PREVNAR 20) 0.5 ML NAPOLEON injection; Inject 0.5 mLs into the muscle once for 1 dose.  Dispense: 0.5 mL; Refill: 0        Electronically signed by: ERNST Valenzuela CNP         Preventive Care Visit  [facility] GERIATRIC SERVICES  ERNST Valenzuela CNP, Family Medicine  Mar 4, 2025      Assessment & Plan    Severe early onset Alzheimer's dementia with agitation (H)  Anxiety due to dementia (H)  Insomnia, unspecified type  Physical deconditioning  Need for shingles vaccine  - zoster vaccine recombinant adjuvanted (SHINGRIX) injection; Inject 0.5 mLs into the muscle once for 1 dose. RN staff to administer 2nd dose in 3 months    Need for pneumococcal vaccination  - pneumococcal (PREVNAR 20) 0.5 ML NAPOLEON injection; Inject 0.5 mLs into the muscle once for 1 dose.    Encounter for Medicare annual wellness exam      Annual Wellness Visit     Patient has been advised of split billing requirements and indicates understanding: Yes     Health Care Directive  Patient has a Health Care Directive on file  Advance care planning document is on file and is current.  In general, how would you rate your overall physical health? good  Do you have a special diet?  Regular (no restrictions)        4/1/2025   Exercise, Social Connection, Stress   Days per week of moderate/strenous exercise 0 days   Average minutes spent exercising at this level 0 min   Frequency of gathering with friends or relatives Pt Unable   Feel stress (tense, anxious, or unable to sleep) Pt Unable     Do you see a dentist two times every year?   Unable to answer secondary to dementia  Have you been more tired than usual lately?  No; unable to answer secondary to dementia  If you drink alcohol do you typically have >3 drinks per day or >7 drinks per week? No  Do you have a current opioid prescription? No  Do you use any other controlled substances or medications that are not prescribed by a provider? None  Social History     Tobacco Use    Smoking status: Former     Current packs/day: 0.00     Types: Cigarettes     Quit date:      Years since quittin.2    Smokeless tobacco: Never   Substance Use Topics    Alcohol use: Not Currently    Drug use: Never     {Provider  If there are gaps in the social history shown above, please follow the link to update and then refresh the note Link to Social and Substance History :[id number]}  Needs assistance for the following daily activities: (!) TELEPHONE USE, (!) TRANSPORTATION, (!) SHOPPING, (!) PREPARING MEALS, (!) HOUSEWORK, (!) BATHING, (!) LAUNDRY, (!) MONEY MANAGEMENT, and (!) DRESSING  Which of the following safety concerns are present in your home?  none identified   Do you (or your family members) have any concerns about your safety while driving?  N/A; not driving d/t dementia  Do you have any of the following hearing concerns?: No hearing concerns  In the past 6 months, have you been bothered by leaking of urine? No     {Fall Risk REQUIRED for AWV, if date of completion is not today  Click here for Fall Risk flowsheet then refresh note to pull results :[id number]}      3/4/2025   Fall Risk   Fallen 2 or more times in the past year? No   Trouble with walking or balance? Yes   Reason Gait Speed Test Not Completed --        Today's PHQ-2 Score: Patient unable to participate secondary to dementia       No data to display                   ASCVD Risk   The 10-year ASCVD risk score (Cristy DK, et al., 2019) is: 14.9%    Values used to calculate the score:      Age: 74 years      Sex: Female      Is  Non- : No      Diabetic: No      Tobacco smoker: No      Systolic Blood Pressure: 143 mmHg      Is BP treated: No      HDL Cholesterol: 49 mg/dL      Total Cholesterol: 227 mg/dL      Reviewed and updated as needed this visit by Provider     Meds  Problems  Med Hx  Surg Hx    Sexual Activity          Current providers sharing in care for this patient include:  Patient Care Team:  Marie High APRN CNP as PCP - General (Family Medicine)  [facility] ([facility]), Jose De Jesus Kelly, Dave Gan MD as Hospitalist (Internal Medicine)  Olivia Stephens as Other (see comments)  Marie High APRN CNP as Assigned PCP    The following health maintenance items are reviewed in Epic and correct as of today:  Health Maintenance   Topic Date Due    ZOSTER IMMUNIZATION (1 of 2) Never done    COLORECTAL CANCER SCREENING  08/23/2019    MAMMO SCREENING  05/26/2023    COVID-19 Vaccine (7 - 2024-25 season) 04/08/2025    LIPID  01/29/2026    MEDICARE ANNUAL WELLNESS VISIT  03/04/2026    FALL RISK ASSESSMENT  03/04/2026    DIABETES SCREENING  01/29/2028    DTAP/TDAP/TD IMMUNIZATION (2 - Td or Tdap) 08/25/2028    ADVANCE CARE PLANNING  03/04/2030    DEXA  09/13/2034    HEPATITIS C SCREENING  Completed    PHQ-2 (once per calendar year)  Completed    INFLUENZA VACCINE  Completed    Pneumococcal Vaccine: 50+ Years  Completed    RSV VACCINE  Completed    HPV IMMUNIZATION  Aged Out    MENINGITIS IMMUNIZATION  Aged Out       Appropriate preventive services were discussed with this patient, including applicable screening as appropriate for fall prevention, nutrition, physical activity, Tobacco-use cessation, weight loss and cognition.  Checklist reviewing preventive services available has been given to the patient.        3/4/2025   Mini Cog   Mini-Cog Not Completed (choose reason) Known dementia           3/4/2025   Vision Screen   Reason Vision Screen Not Completed Attempted, unable to  cooperate             No follow-ups on file.      Lyubov Levy is a 74 year old, presenting for the following:  RECHECK and Wellness Visit      HPI  See above       Advance Care Planning  Patient has a Health Care Directive on file  Advance care planning document is on file and is current.     Today's PHQ-2 Score:        No data to display                   No data to display              Social History     Tobacco Use    Smoking status: Former     Current packs/day: 0.00     Types: Cigarettes     Quit date:      Years since quittin.2    Smokeless tobacco: Never   Substance Use Topics    Alcohol use: Not Currently    Drug use: Never        ASCVD Risk   The 10-year ASCVD risk score (Cristy SUBRAMANIAN, et al., 2019) is: 14.9%    Values used to calculate the score:      Age: 74 years      Sex: Female      Is Non- : No      Diabetic: No      Tobacco smoker: No      Systolic Blood Pressure: 143 mmHg      Is BP treated: No      HDL Cholesterol: 49 mg/dL      Total Cholesterol: 227 mg/dL      Reviewed and updated as needed this visit by Provider     Meds  Problems  Med Hx  Surg Hx    Sexual Activity        Current providers sharing in care for this patient include:  Patient Care Team:  Marie High APRN CNP as PCP - General (Family Medicine)  [facility] Daev Min MD as Hospitalist (Internal Medicine)  Olivia Stephens as Other (see comments)  Marie High APRN CNP as Assigned PCP    The following health maintenance items are reviewed in Epic and correct as of today:  Health Maintenance   Topic Date Due    ZOSTER IMMUNIZATION (1 of 2) Never done    COLORECTAL CANCER SCREENING  2019    MAMMO SCREENING  2023    COVID-19 Vaccine ( season) 2025    LIPID  2026    MEDICARE ANNUAL WELLNESS VISIT  2026    FALL RISK ASSESSMENT  2026    DIABETES SCREENING  2028    DTAP/TDAP/TD  "IMMUNIZATION (2 - Td or Tdap) 08/25/2028    ADVANCE CARE PLANNING  03/04/2030    DEXA  09/13/2034    HEPATITIS C SCREENING  Completed    PHQ-2 (once per calendar year)  Completed    INFLUENZA VACCINE  Completed    Pneumococcal Vaccine: 50+ Years  Completed    RSV VACCINE  Completed    HPV IMMUNIZATION  Aged Out    MENINGITIS IMMUNIZATION  Aged Out          Objective   Exam  BP (!) 143/84   Pulse 70   Temp 97.1  F (36.2  C)   Resp 16   Ht 1.702 m (5' 7\")   Wt 70.9 kg (156 lb 6.4 oz)   SpO2 94%   BMI 24.50 kg/m     Estimated body mass index is 24.5 kg/m  as calculated from the following:    Height as of this encounter: 1.702 m (5' 7\").    Weight as of this encounter: 70.9 kg (156 lb 6.4 oz).      Physical Exam  See above         3/4/2025   Mini Cog   Mini-Cog Not Completed (choose reason) Known dementia         Signed Electronically by: ERNST Valenzuela CNP      Sincerely,        ERNST Valenzuela CNP    Electronically signed  "

## 2025-03-24 DIAGNOSIS — S72.001A HIP FRACTURE, RIGHT, CLOSED, INITIAL ENCOUNTER (H): ICD-10-CM

## 2025-03-24 RX ORDER — DOCUSATE SODIUM 50MG AND SENNOSIDES 8.6MG 8.6; 5 MG/1; MG/1
1 TABLET, FILM COATED ORAL 2 TIMES DAILY PRN
Qty: 62 TABLET | Refills: 11 | Status: SHIPPED | OUTPATIENT
Start: 2025-03-24

## 2025-04-01 VITALS
HEIGHT: 67 IN | RESPIRATION RATE: 16 BRPM | WEIGHT: 156.4 LBS | DIASTOLIC BLOOD PRESSURE: 84 MMHG | TEMPERATURE: 97.1 F | HEART RATE: 70 BPM | SYSTOLIC BLOOD PRESSURE: 143 MMHG | OXYGEN SATURATION: 94 % | BODY MASS INDEX: 24.55 KG/M2

## 2025-04-01 ASSESSMENT — ACTIVITIES OF DAILY LIVING (ADL): CURRENT_FUNCTION: NEEDS ASSISTANCE

## 2025-04-09 ENCOUNTER — ASSISTED LIVING VISIT (OUTPATIENT)
Dept: GERIATRICS | Facility: CLINIC | Age: 75
End: 2025-04-09
Payer: MEDICARE

## 2025-04-09 VITALS — HEIGHT: 67 IN | BODY MASS INDEX: 23.64 KG/M2 | WEIGHT: 150.6 LBS

## 2025-04-09 DIAGNOSIS — F02.C11 SEVERE EARLY ONSET ALZHEIMER'S DEMENTIA WITH AGITATION (H): Primary | ICD-10-CM

## 2025-04-09 DIAGNOSIS — G47.00 INSOMNIA, UNSPECIFIED TYPE: ICD-10-CM

## 2025-04-09 DIAGNOSIS — G30.0 SEVERE EARLY ONSET ALZHEIMER'S DEMENTIA WITH AGITATION (H): Primary | ICD-10-CM

## 2025-04-09 DIAGNOSIS — F03.94 ANXIETY DUE TO DEMENTIA (H): ICD-10-CM

## 2025-04-09 PROCEDURE — 99349 HOME/RES VST EST MOD MDM 40: CPT | Performed by: NURSE PRACTITIONER

## 2025-04-09 NOTE — LETTER
4/9/2025      Mildred Connelly  [address]ong  [address] [facility] [address]        No notes on file      Sincerely,        ERNST Valenzuela CNP    Electronically signed     POTASSIUM 4.1 4.3 3.8   CHLORIDE 101 103 103   CO2 28 31* 29   BUN 13.5 25.5* 18.4   CR 0.70 0.71 0.73   ANIONGAP 13 8 11   EV 10.4 9.2 9.4   GLC 97 93 76         Assessment/Plan:    (G30.0,  F02.C11) 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 Increased restlessness, anxiety reported by staff. Chronic insomnia, unclear how well patient is sleeping at night. Ongoing physical deconditioning, ambulates w/walker.   Plan:   - Add seroquel 12.5mg qAM dx agitation due to dementia  - Continue seroquel 25mg every day PRN dx agitation due to dementia  - Continue seroquel 50mg at bedtime dx agitation due to   - Continue depakote 250mg ER at bedtime, lexapro  - Continue aricept, namenda  - Continue tylenol BID and BID PRN  - Monitor changes in mood or behaviors  - Continue supportive services at [facility] memory care unit  - Patient to follow-up with Neurology as directed  UPDATE: Reviewed patient status and treatment plan with Dayana (daughter), agrees with above changes.       Orders:  - Add seroquel 12.5mg qAM dx agitation due to dementia  - Continue seroquel 25mg every day PRN dx agitation due to dementia  - Continue seroquel 50mg at bedtime dx agitation due to dementia       Electronically signed by: ERNST Valenzuela CNP           Sincerely,        ERNST Valenzuela CNP    Electronically signed

## 2025-04-09 NOTE — PROGRESS NOTES
"[facility] GERIATRICS    Chief Complaint   Patient presents with    RECHECK     HPI:  Mildred Connelly is a 74 year old  (1950), who is being seen today for an episodic care visit at: [facility])([facility]) [[id number]].       Today's concern is: ***        Allergies, and PMH/PSH reviewed in [facility] today.    REVIEW OF SYSTEMS:  {xuyreb66:079078}      Objective:   Ht 1.702 m (5' 7\")   Wt 68.3 kg (150 lb 9.6 oz)   BMI 23.59 kg/m    {Nursing home physical exam :463471}      Recent labs in [facility] reviewed by me today.  Most Recent 3 CBC's:  Recent Labs   Lab Test 01/29/25  0948 03/20/24  1152 02/13/24  0712   WBC 8.4 7.9 7.7   HGB 14.1 13.8 9.3*    101* 97    219 202     Most Recent 3 BMP's:  Recent Labs   Lab Test 01/29/25  0948 12/04/24  1332 07/24/24  1050    142 143   POTASSIUM 4.1 4.3 3.8   CHLORIDE 101 103 103   CO2 28 31* 29   BUN 13.5 25.5* 18.4   CR 0.70 0.71 0.73   ANIONGAP 13 8 11   EV 10.4 9.2 9.4   GLC 97 93 76         Assessment/Plan:    {FGS DX2:006908}          Orders:  {fgsorders:990368}  ***    Electronically signed by: ERNST Valenzuela CNP ***      "   ANIONGAP 13 8 11   EV 10.4 9.2 9.4   GLC 97 93 76         Assessment/Plan:    (G30.0,  F02.C11) 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 Increased restlessness, anxiety reported by staff. Chronic insomnia, unclear how well patient is sleeping at night. Ongoing physical deconditioning, ambulates w/walker.   Plan:   - Add seroquel 12.5mg qAM dx agitation due to dementia  - Continue seroquel 25mg every day PRN dx agitation due to dementia  - Continue seroquel 50mg at bedtime dx agitation due to   - Continue depakote 250mg ER at bedtime, lexapro  - Continue aricept, namenda  - Continue tylenol BID and BID PRN  - Monitor changes in mood or behaviors  - Continue supportive services at [facility] memory care unit  - Patient to follow-up with Neurology as directed  UPDATE: Reviewed patient status and treatment plan with Dayana (daughter), agrees with above changes.       Orders:  - Add seroquel 12.5mg qAM dx agitation due to dementia  - Continue seroquel 25mg every day PRN dx agitation due to dementia  - Continue seroquel 50mg at bedtime dx agitation due to dementia       Electronically signed by: ERNST Valenzuela CNP

## 2025-04-15 RX ORDER — QUETIAPINE FUMARATE 25 MG/1
TABLET, FILM COATED ORAL
Qty: 40 TABLET | Refills: 11 | Status: SHIPPED | OUTPATIENT
Start: 2025-04-15

## 2025-04-15 NOTE — PATIENT INSTRUCTIONS
[facility] Geriatrics   April 15, 2025     Name: Mildred Connelly   : 1950       Orders:  - Add seroquel 12.5mg qAM dx agitation due to dementia  - Continue seroquel 25mg every day PRN dx agitation due to dementia  - Continue seroquel 50mg at bedtime dx agitation due to dementia         Electronically signed by  ERNST Valenzuela CNP on 4/15/2025 at 4:56 PM

## 2025-04-30 ENCOUNTER — DOCUMENTATION ONLY (OUTPATIENT)
Dept: GERIATRICS | Facility: CLINIC | Age: 75
End: 2025-04-30
Payer: MEDICARE

## 2025-04-30 DIAGNOSIS — G30.0 SEVERE EARLY ONSET ALZHEIMER'S DEMENTIA WITH AGITATION (H): Primary | ICD-10-CM

## 2025-04-30 DIAGNOSIS — F02.C11 SEVERE EARLY ONSET ALZHEIMER'S DEMENTIA WITH AGITATION (H): Primary | ICD-10-CM

## 2025-04-30 PROCEDURE — 87086 URINE CULTURE/COLONY COUNT: CPT | Mod: ORL | Performed by: NURSE PRACTITIONER

## 2025-04-30 PROCEDURE — 81001 URINALYSIS AUTO W/SCOPE: CPT | Mod: ORL | Performed by: NURSE PRACTITIONER

## 2025-04-30 RX ORDER — DIVALPROEX SODIUM 125 MG/1
125 CAPSULE, COATED PELLETS ORAL 2 TIMES DAILY
Qty: 60 CAPSULE | Refills: 11 | Status: SHIPPED | OUTPATIENT
Start: 2025-04-30

## 2025-04-30 NOTE — PROGRESS NOTES
[facility] Geriatric[facility]   2025     Name: Mildred Connelly   : 1950     Background:  RN staff report patient is chewing depakote tablets, having increase in behaviors with agitation and pacing. Requesting to change to different formulary of depakote.       Orders:  - Check UA/UC dx urinary incontinence   - Discontinue depakote ER  - Add depakote DR 125mg capsule BID. Ok to open capsule and sprinkle on food. dx agitation due to dementia        Electronically signed by   ERNST Valenzuela CNP on 2025 at 8:47 PM

## 2025-05-01 ENCOUNTER — LAB REQUISITION (OUTPATIENT)
Dept: LAB | Facility: CLINIC | Age: 75
End: 2025-05-01
Payer: MEDICARE

## 2025-05-01 DIAGNOSIS — R39.81 FUNCTIONAL URINARY INCONTINENCE: ICD-10-CM

## 2025-05-01 DIAGNOSIS — R35.0 FREQUENCY OF MICTURITION: ICD-10-CM

## 2025-05-01 LAB
ALBUMIN UR-MCNC: 10 MG/DL
APPEARANCE UR: ABNORMAL
BILIRUB UR QL STRIP: NEGATIVE
CAOX CRY #/AREA URNS HPF: ABNORMAL /HPF
COLOR UR AUTO: YELLOW
GLUCOSE UR STRIP-MCNC: NEGATIVE MG/DL
HGB UR QL STRIP: NEGATIVE
HYALINE CASTS: 4 /LPF
KETONES UR STRIP-MCNC: NEGATIVE MG/DL
LEUKOCYTE ESTERASE UR QL STRIP: ABNORMAL
MUCOUS THREADS #/AREA URNS LPF: PRESENT /LPF
NITRATE UR QL: NEGATIVE
PH UR STRIP: 5.5 [PH] (ref 5–7)
RBC URINE: 3 /HPF
SP GR UR STRIP: 1.03 (ref 1–1.03)
SQUAMOUS EPITHELIAL: 1 /HPF
TRANSITIONAL EPI: 1 /HPF
UROBILINOGEN UR STRIP-MCNC: NORMAL MG/DL
WBC CLUMPS #/AREA URNS HPF: PRESENT /HPF
WBC URINE: 59 /HPF

## 2025-05-02 LAB — BACTERIA UR CULT: NORMAL

## 2025-07-10 VITALS — BODY MASS INDEX: 22.63 KG/M2 | HEIGHT: 67 IN | WEIGHT: 144.2 LBS

## 2025-07-10 NOTE — PROGRESS NOTES
"[facility] GERIATRICS    Chief Complaint   Patient presents with    RECHECK     HPI:  Mildred Connelly is a 74 year old  (1950), who is being seen today for an episodic care visit at: [facility])(FGS) [[id number]]. Today's concern is: ***    Allergies, and PMH/PSH reviewed in [facility] today.  REVIEW OF SYSTEMS:  {wgbali84:418754}    Objective:   Ht 1.702 m (5' 7\")   Wt 65.4 kg (144 lb 3.2 oz)   BMI 22.58 kg/m    {Nursing home physical exam :925135}    {fgslab:847452}    Assessment/Plan:  {[facility] DX2:684148}    MED REC REQUIRED{TIP  Click the link below to document or use med rec list, use list to pull in response :843646}  Post Medication Reconciliation Status: {MED REC LIST:907728}      Orders:  {fgsorders:208742}  ***    Electronically signed by: Olivia Stephens ***      " 97    219 202     Most Recent 3 BMP's:  Recent Labs   Lab Test 01/29/25  0948 12/04/24  1332 07/24/24  1050    142 143   POTASSIUM 4.1 4.3 3.8   CHLORIDE 101 103 103   CO2 28 31* 29   BUN 13.5 25.5* 18.4   CR 0.70 0.71 0.73   ANIONGAP 13 8 11   EV 10.4 9.2 9.4   GLC 97 93 76     Most Recent 2 LFT's:  Recent Labs   Lab Test 01/29/25  0948 04/10/24  1118   AST 19 16   ALT 21 20   ALKPHOS 95 97   BILITOTAL 0.7 0.6     TSH   Date Value Ref Range Status   01/29/2025 1.39 0.30 - 4.20 uIU/mL Final     Recent Labs   Lab Test 01/29/25  0948   CHOL 227*  227*   HDL 49*  49*   *  146*   TRIG 131  131       Most Recent Immunizations   Administered Date(s) Administered    COVID-19 12+ (MODERNA) 11/14/2023    COVID-19 12+ (Pfizer) 10/08/2024    COVID-19 Monovalent 18+ (Moderna) 07/28/2022    Influenza (High Dose) Trivalent,PF (Fluzone) 10/08/2024    Influenza Vaccine 65+ (FLUAD) 09/22/2023    Pneumo Conj 13-V (2010&after) 06/02/2014    Pneumococcal 20 valent Conjugate (Prevnar 20) 03/06/2025    Pneumococcal 23 valent 08/22/2018    RSV (Abrysvo) 01/10/2024    TD,PF 7+ (Tenivac) 09/22/2007    TDAP (Adacel,Boostrix) 08/25/2018    Zoster recombinant adjuvanted (Shingrix) 06/04/2025         Assessment/Plan:    (G30.0,  F02.C11) Severe early onset Alzheimer's dementia with agitation (H)  (primary encounter diagnosis)  (F03.94) Anxiety due to dementia (H)  (R53.81) Physical deconditioning  (G47.00) Insomnia, unspecified type  Comment: Severe Alzheimer's dementia with hx agitation, paranoia, hallucinations. Behaviors improved. Chronic insomnia. Ongoing physical deconditioning, ambulates w/walker.   Plan:   - Check CMP, CBC on 7/16/25 dx agitation due to dementia.   - Change all night time med passes to 8PM  - Continue seroquel 12.5mg qAM, 50mg at bedtime; 25mg every day PRN  - Continue depakote capsules BID; staff to sprinkle in food  - Continue aricept, namenda  - Continue tylenol BID and BID PRN  - Monitor  changes in mood or behaviors  - Continue supportive services at [facility] memory care unit  - Patient to follow-up with Neurology as directed  - [employer] message sent to Dayana (daughter) to review patient status and treatment plan     (E78.2) Mixed hyperlipidemia  Comment: Chronic  Plan:   - Check cholesterol, LDL direct, HDL, triglycerides on 7/16/25 dx hyperlipidemia   - Continue lipitor 20mg every day, started on 2/1/25      Orders:  - Check CMP, CBC on 7/16/25 dx agitation due to dementia.   - Check cholesterol, LDL direct, HDL, triglycerides on 7/16/25 dx hyperlipidemia   - Change all night time med passes to 8PM      Electronically signed by: ERNST Valenzuela CNP

## 2025-07-11 ENCOUNTER — ASSISTED LIVING VISIT (OUTPATIENT)
Dept: GERIATRICS | Facility: CLINIC | Age: 75
End: 2025-07-11
Payer: MEDICARE

## 2025-07-11 VITALS
TEMPERATURE: 97.3 F | HEART RATE: 66 BPM | DIASTOLIC BLOOD PRESSURE: 77 MMHG | WEIGHT: 140 LBS | RESPIRATION RATE: 16 BRPM | OXYGEN SATURATION: 97 % | BODY MASS INDEX: 21.97 KG/M2 | SYSTOLIC BLOOD PRESSURE: 118 MMHG | HEIGHT: 67 IN

## 2025-07-11 DIAGNOSIS — G30.0 SEVERE EARLY ONSET ALZHEIMER'S DEMENTIA WITH AGITATION (H): Primary | ICD-10-CM

## 2025-07-11 DIAGNOSIS — G47.00 INSOMNIA, UNSPECIFIED TYPE: ICD-10-CM

## 2025-07-11 DIAGNOSIS — R53.81 PHYSICAL DECONDITIONING: ICD-10-CM

## 2025-07-11 DIAGNOSIS — F03.94 ANXIETY DUE TO DEMENTIA (H): ICD-10-CM

## 2025-07-11 DIAGNOSIS — F02.C11 SEVERE EARLY ONSET ALZHEIMER'S DEMENTIA WITH AGITATION (H): Primary | ICD-10-CM

## 2025-07-11 DIAGNOSIS — E78.2 MIXED HYPERLIPIDEMIA: ICD-10-CM

## 2025-07-11 PROCEDURE — 99349 HOME/RES VST EST MOD MDM 40: CPT | Performed by: NURSE PRACTITIONER

## 2025-07-11 NOTE — LETTER
7/11/2025      Mildred Connelly  [address]  [address] [facility] [address]        No notes on file      Sincerely,        ERNST Valenzuela CNP    Electronically signed   Test 01/29/25  0948 03/20/24  1152 02/13/24  0712   WBC 8.4 7.9 7.7   HGB 14.1 13.8 9.3*    101* 97    219 202     Most Recent 3 BMP's:  Recent Labs   Lab Test 01/29/25  0948 12/04/24  1332 07/24/24  1050    142 143   POTASSIUM 4.1 4.3 3.8   CHLORIDE 101 103 103   CO2 28 31* 29   BUN 13.5 25.5* 18.4   CR 0.70 0.71 0.73   ANIONGAP 13 8 11   EV 10.4 9.2 9.4   GLC 97 93 76     Most Recent 2 LFT's:  Recent Labs   Lab Test 01/29/25  0948 04/10/24  1118   AST 19 16   ALT 21 20   ALKPHOS 95 97   BILITOTAL 0.7 0.6     TSH   Date Value Ref Range Status   01/29/2025 1.39 0.30 - 4.20 uIU/mL Final     Recent Labs   Lab Test 01/29/25  0948   CHOL 227*  227*   HDL 49*  49*   *  146*   TRIG 131  131       Most Recent Immunizations   Administered Date(s) Administered     COVID-19 12+ (MODERNA) 11/14/2023     COVID-19 12+ (Pfizer) 10/08/2024     COVID-19 Monovalent 18+ (Moderna) 07/28/2022     Influenza (High Dose) Trivalent,PF (Fluzone) 10/08/2024     Influenza Vaccine 65+ (FLUAD) 09/22/2023     Pneumo Conj 13-V (2010&after) 06/02/2014     Pneumococcal 20 valent Conjugate (Prevnar 20) 03/06/2025     Pneumococcal 23 valent 08/22/2018     RSV (Abrysvo) 01/10/2024     TD,PF 7+ (Tenivac) 09/22/2007     TDAP (Adacel,Boostrix) 08/25/2018     Zoster recombinant adjuvanted (Shingrix) 06/04/2025         Assessment/Plan:    (G30.0,  F02.C11) Severe early onset Alzheimer's dementia with agitation (H)  (primary encounter diagnosis)  (F03.94) Anxiety due to dementia (H)  (R53.81) Physical deconditioning  (G47.00) Insomnia, unspecified type  Comment: Severe Alzheimer's dementia with hx agitation, paranoia, hallucinations. Behaviors improved. Chronic insomnia. Ongoing physical deconditioning, ambulates w/walker.   Plan:   - Check CMP, CBC on 7/16/25 dx agitation due to dementia.   - Change all night time med passes to 8PM  - Continue seroquel 12.5mg qAM, 50mg at bedtime; 25mg every day PRN  - Continue depakote  capsules BID; staff to sprinkle in food  - Continue aricept, namenda  - Continue tylenol BID and BID PRN  - Monitor changes in mood or behaviors  - Continue supportive services at [facility] memory care unit  - Patient to follow-up with Neurology as directed  - [employer] message sent to Dayana (daughter) to review patient status and treatment plan     (E78.2) Mixed hyperlipidemia  Comment: Chronic  Plan:   - Check cholesterol, LDL direct, HDL, triglycerides on 7/16/25 dx hyperlipidemia   - Continue lipitor 20mg every day, started on 2/1/25      Orders:  - Check CMP, CBC on 7/16/25 dx agitation due to dementia.   - Check cholesterol, LDL direct, HDL, triglycerides on 7/16/25 dx hyperlipidemia   - Change all night time med passes to 8PM      Electronically signed by: ERNST Valenzuela CNP           Sincerely,        ERNST Valenzuela CNP    Electronically signed

## 2025-07-11 NOTE — PATIENT INSTRUCTIONS
[facility] Ger[facility]   2025     Name: Mildred Connelly   : 1950       Orders:  - Check CMP, CBC on 25 dx agitation due to dementia.   - Check cholesterol, LDL direct, HDL, triglycerides on 25 dx hyperlipidemia   - Change all night time med passes to 8PM      Electronically signed by   ERNST Valenzuela CNP on 2025 at 2:18 PM

## 2025-07-13 ENCOUNTER — LAB REQUISITION (OUTPATIENT)
Dept: LAB | Facility: CLINIC | Age: 75
End: 2025-07-13
Payer: MEDICARE

## 2025-07-13 DIAGNOSIS — F03.911 UNSPECIFIED DEMENTIA, UNSPECIFIED SEVERITY, WITH AGITATION (H): ICD-10-CM

## 2025-07-13 DIAGNOSIS — E78.5 HYPERLIPIDEMIA, UNSPECIFIED: ICD-10-CM

## 2025-07-17 ENCOUNTER — TELEPHONE (OUTPATIENT)
Dept: GERIATRICS | Facility: CLINIC | Age: 75
End: 2025-07-17
Payer: MEDICARE

## 2025-07-17 NOTE — TELEPHONE ENCOUNTER
"[facility] Triage Lab Review Request    Provider: ERNST Fajardo CNP   Facility: [facility]  Facility Type:  AL    Caller: Annamaria   Call Back Number: [phone]    Allergies:  No Known Allergies     Lab Results for Review: FYI  The patient was to have labs drawn yesterday but was out of the building.  Labs have been rescheduled for next week lab day.     Telephone encounter sent to:  ERNST Fajardo CNP     Please send response/orders to \"Geriatrics Nurse Pool\"    Carmella Dela Cruz RN      "

## 2025-07-23 LAB
ALBUMIN SERPL BCG-MCNC: 4.1 G/DL (ref 3.5–5.2)
ALP SERPL-CCNC: 81 U/L (ref 40–150)
ALT SERPL W P-5'-P-CCNC: 102 U/L (ref 0–50)
ANION GAP SERPL CALCULATED.3IONS-SCNC: 10 MMOL/L (ref 7–15)
AST SERPL W P-5'-P-CCNC: 36 U/L (ref 0–45)
BASOPHILS # BLD AUTO: 0.1 10E3/UL (ref 0–0.2)
BASOPHILS NFR BLD AUTO: 1 %
BILIRUB SERPL-MCNC: 0.9 MG/DL
BUN SERPL-MCNC: 29.4 MG/DL (ref 8–23)
CALCIUM SERPL-MCNC: 9.5 MG/DL (ref 8.8–10.4)
CHLORIDE SERPL-SCNC: 102 MMOL/L (ref 98–107)
CREAT SERPL-MCNC: 0.64 MG/DL (ref 0.51–0.95)
EGFRCR SERPLBLD CKD-EPI 2021: >90 ML/MIN/1.73M2
EOSINOPHIL # BLD AUTO: 0.1 10E3/UL (ref 0–0.7)
EOSINOPHIL NFR BLD AUTO: 1 %
ERYTHROCYTE [DISTWIDTH] IN BLOOD BY AUTOMATED COUNT: 12.4 % (ref 10–15)
GLUCOSE SERPL-MCNC: 100 MG/DL (ref 70–99)
HCO3 SERPL-SCNC: 31 MMOL/L (ref 22–29)
HCT VFR BLD AUTO: 40.6 % (ref 35–47)
HGB BLD-MCNC: 13.2 G/DL (ref 11.7–15.7)
IMM GRANULOCYTES # BLD: 0 10E3/UL
IMM GRANULOCYTES NFR BLD: 0 %
LYMPHOCYTES # BLD AUTO: 1.8 10E3/UL (ref 0.8–5.3)
LYMPHOCYTES NFR BLD AUTO: 26 %
MCH RBC QN AUTO: 32.6 PG (ref 26.5–33)
MCHC RBC AUTO-ENTMCNC: 32.5 G/DL (ref 31.5–36.5)
MCV RBC AUTO: 100 FL (ref 78–100)
MONOCYTES # BLD AUTO: 0.3 10E3/UL (ref 0–1.3)
MONOCYTES NFR BLD AUTO: 5 %
NEUTROPHILS # BLD AUTO: 4.7 10E3/UL (ref 1.6–8.3)
NEUTROPHILS NFR BLD AUTO: 67 %
NRBC # BLD AUTO: 0 10E3/UL
NRBC BLD AUTO-RTO: 0 /100
PLATELET # BLD AUTO: 189 10E3/UL (ref 150–450)
POTASSIUM SERPL-SCNC: 3.6 MMOL/L (ref 3.4–5.3)
PROT SERPL-MCNC: 7 G/DL (ref 6.4–8.3)
RBC # BLD AUTO: 4.05 10E6/UL (ref 3.8–5.2)
SODIUM SERPL-SCNC: 143 MMOL/L (ref 135–145)
WBC # BLD AUTO: 7 10E3/UL (ref 4–11)

## 2025-07-23 PROCEDURE — 85025 COMPLETE CBC W/AUTO DIFF WBC: CPT | Mod: ORL | Performed by: NURSE PRACTITIONER

## 2025-07-23 PROCEDURE — P9604 ONE-WAY ALLOW PRORATED TRIP: HCPCS | Mod: ORL | Performed by: NURSE PRACTITIONER

## 2025-07-23 PROCEDURE — 80053 COMPREHEN METABOLIC PANEL: CPT | Mod: ORL | Performed by: NURSE PRACTITIONER

## 2025-07-23 PROCEDURE — 36415 COLL VENOUS BLD VENIPUNCTURE: CPT | Mod: ORL | Performed by: NURSE PRACTITIONER

## 2025-07-24 ENCOUNTER — ASSISTED LIVING VISIT (OUTPATIENT)
Dept: GERIATRICS | Facility: CLINIC | Age: 75
End: 2025-07-24
Payer: MEDICARE

## 2025-07-24 VITALS
TEMPERATURE: 97.3 F | BODY MASS INDEX: 21.97 KG/M2 | WEIGHT: 140 LBS | RESPIRATION RATE: 16 BRPM | HEIGHT: 67 IN | SYSTOLIC BLOOD PRESSURE: 118 MMHG | OXYGEN SATURATION: 97 % | DIASTOLIC BLOOD PRESSURE: 77 MMHG | HEART RATE: 66 BPM

## 2025-07-24 DIAGNOSIS — R53.81 PHYSICAL DECONDITIONING: ICD-10-CM

## 2025-07-24 DIAGNOSIS — E78.2 MIXED HYPERLIPIDEMIA: ICD-10-CM

## 2025-07-24 DIAGNOSIS — R74.01 ELEVATED ALT MEASUREMENT: Primary | ICD-10-CM

## 2025-07-24 PROCEDURE — 99349 HOME/RES VST EST MOD MDM 40: CPT | Performed by: NURSE PRACTITIONER

## 2025-07-24 NOTE — LETTER
7/24/2025      Mildred Connelly  C/o [address] Vuong  [address] [facility] [address]        No notes on file      Sincerely,        ERNST Valenzuela CNP    Electronically signed   07/23/25  1037 01/29/25  0948   AST 36 19   * 21   ALKPHOS 81 95   BILITOTAL 0.9 0.7         Assessment/Plan:    (R74.01) Elevated ALT measurement  (primary encounter diagnosis)  (E78.2) Mixed hyperlipidemia  (R53.81) Physical deconditioning  Comment: Elevated ALT likely related to polypharmacy. Chronic hyperlipidemia, recently started lipitor in 02/2025. Ongoing physical deconditioning, ambulates w/walker.   Plan:   - Discontinue scheduled tylenol   - Change tylenol to 1000mg every 6hrs PRN dx pain   - Hold lipitor x 1 month  - Recheck CMP on 8/13/25 dx elevated liver enzyme  - Lipid panel missed on 7/16/25; message sent to RN staff to check lipid panel next lab day  - Sent [employer] message to Dayana (daughter) regarding update on labs and changes to treatment plan      Orders:  - Discontinue scheduled tylenol   - Change tylenol to 1000mg every 6hrs PRN dx pain   - Hold lipitor x 1 month  - Recheck CMP on 8/13/25 dx elevated liver enzyme      Electronically signed by: ERNST Valenzuela CNP           Sincerely,        ERNST Valenzuela CNP    Electronically signed

## 2025-07-24 NOTE — PROGRESS NOTES
"[facility]    Chief Complaint   Patient presents with    RECHECK     HPI:  Mildred Connelly is a 74 year old  (1950), who is being seen today for an episodic care visit at: [facility] ([facility])([facility]) [[id number]].       Today's concern is: ***        Allergies, and PMH/PSH reviewed in [facility] today.    REVIEW OF SYSTEMS:  {gkafqe88:511907}      Objective:   /77   Pulse 66   Temp 97.3  F (36.3  C)   Resp 16   Ht 1.702 m (5' 7\")   Wt 63.5 kg (140 lb)   SpO2 97%   BMI 21.93 kg/m    {Nursing home physical exam :799391}      {fgslab:184190}      Assessment/Plan:    {[facility] DX2:585757}    MED REC REQUIRED{TIP  Click the link below to document or use med rec list, use list to pull in response :507563}  Post Medication Reconciliation Status: {MED REC LIST:138345}      Orders:  {fgsorders:160377}  ***    Electronically signed by: Olivia Stephens ***      "         Assessment/Plan:    (R74.01) Elevated ALT measurement  (primary encounter diagnosis)  (E78.2) Mixed hyperlipidemia  (R53.81) Physical deconditioning  Comment: Elevated ALT likely related to polypharmacy. Chronic hyperlipidemia, recently started lipitor in 02/2025. Ongoing physical deconditioning, ambulates w/walker.   Plan:   - Discontinue scheduled tylenol   - Change tylenol to 1000mg every 6hrs PRN dx pain   - Hold lipitor x 1 month  - Recheck CMP on 8/13/25 dx elevated liver enzyme  - Lipid panel missed on 7/16/25; message sent to RN staff to check lipid panel next lab day  - Sent [employer] message to Dayana (daughter) regarding update on labs and changes to treatment plan      Orders:  - Discontinue scheduled tylenol   - Change tylenol to 1000mg every 6hrs PRN dx pain   - Hold lipitor x 1 month  - Recheck CMP on 8/13/25 dx elevated liver enzyme      Electronically signed by: ERNST Valenzuela CNP

## 2025-07-25 RX ORDER — PSEUDOEPHED/ACETAMINOPH/DIPHEN 30MG-500MG
1000 TABLET ORAL EVERY 6 HOURS PRN
Qty: 186 TABLET | Refills: 11 | Status: SHIPPED | OUTPATIENT
Start: 2025-07-25

## 2025-07-25 NOTE — PATIENT INSTRUCTIONS
[facility]   2025     Name: Mildred Connelly   : 1950       Orders:  - Discontinue scheduled tylenol   - Change tylenol to 1000mg every 6hrs PRN dx pain   - Hold lipitor x 1 month  - Recheck CMP on 25 dx elevated liver enzyme      Electronically signed by   Marie High, APRN CNP on 2025 at 6:58 AM

## 2025-07-28 ENCOUNTER — LAB REQUISITION (OUTPATIENT)
Dept: LAB | Facility: CLINIC | Age: 75
End: 2025-07-28
Payer: MEDICARE

## 2025-07-28 DIAGNOSIS — E78.5 HYPERLIPIDEMIA, UNSPECIFIED: ICD-10-CM

## 2025-07-30 PROCEDURE — P9603 ONE-WAY ALLOW PRORATED MILES: HCPCS | Mod: ORL | Performed by: NURSE PRACTITIONER

## 2025-07-30 PROCEDURE — 36415 COLL VENOUS BLD VENIPUNCTURE: CPT | Mod: ORL | Performed by: NURSE PRACTITIONER

## 2025-07-30 PROCEDURE — 80061 LIPID PANEL: CPT | Mod: ORL | Performed by: NURSE PRACTITIONER

## 2025-07-31 ENCOUNTER — RESULTS FOLLOW-UP (OUTPATIENT)
Dept: GERIATRICS | Facility: CLINIC | Age: 75
End: 2025-07-31
Payer: MEDICARE

## 2025-07-31 LAB
CHOLEST SERPL-MCNC: 167 MG/DL
FASTING STATUS PATIENT QL REPORTED: NO
HDLC SERPL-MCNC: 48 MG/DL
LDLC SERPL CALC-MCNC: 99 MG/DL
NONHDLC SERPL-MCNC: 119 MG/DL
TRIGL SERPL-MCNC: 101 MG/DL

## 2025-08-07 ENCOUNTER — LAB REQUISITION (OUTPATIENT)
Dept: LAB | Facility: CLINIC | Age: 75
End: 2025-08-07
Payer: MEDICARE

## 2025-09-02 VITALS
HEIGHT: 67 IN | DIASTOLIC BLOOD PRESSURE: 66 MMHG | HEART RATE: 77 BPM | SYSTOLIC BLOOD PRESSURE: 94 MMHG | WEIGHT: 143 LBS | BODY MASS INDEX: 22.44 KG/M2

## 2025-09-03 ENCOUNTER — ASSISTED LIVING VISIT (OUTPATIENT)
Dept: GERIATRICS | Facility: CLINIC | Age: 75
End: 2025-09-03
Payer: MEDICARE

## 2025-09-03 DIAGNOSIS — G30.0 SEVERE EARLY ONSET ALZHEIMER'S DEMENTIA WITH AGITATION (H): Primary | ICD-10-CM

## 2025-09-03 DIAGNOSIS — F02.C11 SEVERE EARLY ONSET ALZHEIMER'S DEMENTIA WITH AGITATION (H): Primary | ICD-10-CM

## 2025-09-03 DIAGNOSIS — R53.81 PHYSICAL DECONDITIONING: ICD-10-CM

## (undated) DEVICE — KIT PATIENT CARE HANA TABLE PROFX SUPINE 6855

## (undated) DEVICE — DRSG XEROFORM 1X8"

## (undated) DEVICE — DRAPE C-ARMOR 5 SIDED 5523

## (undated) DEVICE — SU VICRYL 2-0 CT-1 27" UND J259H

## (undated) DEVICE — WIRE GUIDE 3.2X400MM  357.399

## (undated) DEVICE — SOL WATER IRRIG 1000ML BOTTLE 2F7114

## (undated) DEVICE — DRAPE STERI U 1015

## (undated) DEVICE — Device

## (undated) DEVICE — LINEN TOWEL PACK X5 5464

## (undated) DEVICE — DRILL BIL CANNULATED 6MM/9MM 03.037.022

## (undated) DEVICE — DECANTER BAG 2002S

## (undated) DEVICE — DRAPE IOBAN ISOLATION VERTICAL 320X21CM 6617

## (undated) DEVICE — DRSG AQUACEL AG 3.5X6.0" HYDROFIBER 412010

## (undated) DEVICE — CAST PADDING 4" UNSTERILE 9044

## (undated) DEVICE — PACK HIP NAILING SOP15HNSB

## (undated) DEVICE — DRSG KERLIX FLUFFS X5

## (undated) DEVICE — SU VICRYL 0 CT-1 27" UND J260H

## (undated) DEVICE — SU MONOCRYL 3-0 PS-2 18" UND Y497G

## (undated) DEVICE — CATH TRAY FOLEY SURESTEP 16FR WDRAIN BAG STLK LATEX A300316A

## (undated) DEVICE — SYR 10ML FINGER CONTROL W/O NDL 309695

## (undated) DEVICE — NDL 22GA 1" 305155

## (undated) DEVICE — DRAPE C-ARM 60X42" 1013

## (undated) DEVICE — DRSG ABDOMINAL 07 1/2X8" 7197D

## (undated) DEVICE — DRILL BIT CANNULATED 16MM FLEX

## (undated) DEVICE — STPL SKIN 35W 6.9MM  PXW35

## (undated) DEVICE — SU VICRYL 2-0 CT-2 CR 8X18" J726D

## (undated) DEVICE — SU VICRYL 0 CT-1 CR 8X18" J740D

## (undated) DEVICE — DRILL BIT QUICK COUPLING 3 FLUTE 4.2MMX330/100MM CALIBRATE

## (undated) DEVICE — PREP CHLORAPREP 26ML TINTED ORANGE  260815

## (undated) RX ORDER — PROPOFOL 10 MG/ML
INJECTION, EMULSION INTRAVENOUS
Status: DISPENSED
Start: 2024-02-08

## (undated) RX ORDER — TRANEXAMIC ACID 10 MG/ML
INJECTION, SOLUTION INTRAVENOUS
Status: DISPENSED
Start: 2024-02-08

## (undated) RX ORDER — ONDANSETRON 2 MG/ML
INJECTION INTRAMUSCULAR; INTRAVENOUS
Status: DISPENSED
Start: 2024-02-08

## (undated) RX ORDER — FENTANYL CITRATE 50 UG/ML
INJECTION, SOLUTION INTRAMUSCULAR; INTRAVENOUS
Status: DISPENSED
Start: 2024-02-08

## (undated) RX ORDER — CEFAZOLIN SODIUM/WATER 2 G/20 ML
SYRINGE (ML) INTRAVENOUS
Status: DISPENSED
Start: 2024-02-08

## (undated) RX ORDER — HYDROMORPHONE HCL IN WATER/PF 6 MG/30 ML
PATIENT CONTROLLED ANALGESIA SYRINGE INTRAVENOUS
Status: DISPENSED
Start: 2024-02-08

## (undated) RX ORDER — DEXAMETHASONE SODIUM PHOSPHATE 4 MG/ML
INJECTION, SOLUTION INTRA-ARTICULAR; INTRALESIONAL; INTRAMUSCULAR; INTRAVENOUS; SOFT TISSUE
Status: DISPENSED
Start: 2024-02-08